# Patient Record
Sex: FEMALE | Race: WHITE | Employment: OTHER | URBAN - METROPOLITAN AREA
[De-identification: names, ages, dates, MRNs, and addresses within clinical notes are randomized per-mention and may not be internally consistent; named-entity substitution may affect disease eponyms.]

---

## 2021-07-25 ENCOUNTER — APPOINTMENT (OUTPATIENT)
Dept: GENERAL RADIOLOGY | Age: 86
DRG: 871 | End: 2021-07-25
Attending: EMERGENCY MEDICINE
Payer: MEDICARE

## 2021-07-25 ENCOUNTER — HOSPITAL ENCOUNTER (INPATIENT)
Age: 86
LOS: 8 days | Discharge: NURSING, REHAB OR LONG-TERM CARE | DRG: 871 | End: 2021-08-02
Attending: EMERGENCY MEDICINE | Admitting: INTERNAL MEDICINE
Payer: MEDICARE

## 2021-07-25 DIAGNOSIS — A41.9 SEPSIS WITHOUT ACUTE ORGAN DYSFUNCTION, DUE TO UNSPECIFIED ORGANISM (HCC): ICD-10-CM

## 2021-07-25 DIAGNOSIS — R07.9 ACUTE CHEST PAIN: Primary | ICD-10-CM

## 2021-07-25 DIAGNOSIS — K81.0 CHOLECYSTITIS, ACUTE: ICD-10-CM

## 2021-07-25 DIAGNOSIS — N30.00 ACUTE CYSTITIS WITHOUT HEMATURIA: ICD-10-CM

## 2021-07-25 PROBLEM — I20.0 UNSTABLE ANGINA (HCC): Status: ACTIVE | Noted: 2021-07-25

## 2021-07-25 LAB
ALBUMIN SERPL-MCNC: 3.3 G/DL (ref 3.5–5)
ALBUMIN/GLOB SERPL: 1 {RATIO} (ref 1.1–2.2)
ALP SERPL-CCNC: 52 U/L (ref 45–117)
ALT SERPL-CCNC: 31 U/L (ref 12–78)
ANION GAP SERPL CALC-SCNC: 6 MMOL/L (ref 5–15)
APTT PPP: 31.4 SEC (ref 21.2–34.1)
AST SERPL W P-5'-P-CCNC: 33 U/L (ref 15–37)
ATRIAL RATE: 92 BPM
BASOPHILS # BLD: 0 K/UL (ref 0–0.1)
BASOPHILS NFR BLD: 0 % (ref 0–1)
BILIRUB SERPL-MCNC: 1.2 MG/DL (ref 0.2–1)
BNP SERPL-MCNC: 4475 PG/ML
BUN SERPL-MCNC: 27 MG/DL (ref 6–20)
BUN/CREAT SERPL: 29 (ref 12–20)
CA-I BLD-MCNC: 9.4 MG/DL (ref 8.5–10.1)
CALCULATED R AXIS, ECG10: 15 DEGREES
CALCULATED T AXIS, ECG11: -43 DEGREES
CHLORIDE SERPL-SCNC: 105 MMOL/L (ref 97–108)
CO2 SERPL-SCNC: 28 MMOL/L (ref 21–32)
COVID-19 RAPID TEST, COVR: NOT DETECTED
CREAT SERPL-MCNC: 0.92 MG/DL (ref 0.55–1.02)
DIAGNOSIS, 93000: NORMAL
DIFFERENTIAL METHOD BLD: ABNORMAL
EOSINOPHIL # BLD: 0 K/UL (ref 0–0.4)
EOSINOPHIL NFR BLD: 0 % (ref 0–7)
ERYTHROCYTE [DISTWIDTH] IN BLOOD BY AUTOMATED COUNT: 15.7 % (ref 11.5–14.5)
GLOBULIN SER CALC-MCNC: 3.2 G/DL (ref 2–4)
GLUCOSE SERPL-MCNC: 171 MG/DL (ref 65–100)
HCT VFR BLD AUTO: 34 % (ref 35–47)
HGB BLD-MCNC: 11 G/DL (ref 11.5–16)
IMM GRANULOCYTES # BLD AUTO: 0.1 K/UL (ref 0–0.04)
IMM GRANULOCYTES NFR BLD AUTO: 1 % (ref 0–0.5)
INR PPP: 1.4 (ref 0.9–1.1)
LYMPHOCYTES # BLD: 0.9 K/UL (ref 0.8–3.5)
LYMPHOCYTES NFR BLD: 6 % (ref 12–49)
MCH RBC QN AUTO: 29 PG (ref 26–34)
MCHC RBC AUTO-ENTMCNC: 32.4 G/DL (ref 30–36.5)
MCV RBC AUTO: 89.7 FL (ref 80–99)
MONOCYTES # BLD: 0.7 K/UL (ref 0–1)
MONOCYTES NFR BLD: 4 % (ref 5–13)
NEUTS SEG # BLD: 13.4 K/UL (ref 1.8–8)
NEUTS SEG NFR BLD: 89 % (ref 32–75)
NRBC # BLD: 0 K/UL (ref 0–0.01)
NRBC BLD-RTO: 0 PER 100 WBC
PLATELET # BLD AUTO: 172 K/UL (ref 150–400)
PMV BLD AUTO: 11.5 FL (ref 8.9–12.9)
POTASSIUM SERPL-SCNC: 3.6 MMOL/L (ref 3.5–5.1)
PROT SERPL-MCNC: 6.5 G/DL (ref 6.4–8.2)
PROTHROMBIN TIME: 17 SEC (ref 11.9–14.7)
Q-T INTERVAL, ECG07: 352 MS
QRS DURATION, ECG06: 98 MS
QTC CALCULATION (BEZET), ECG08: 447 MS
RBC # BLD AUTO: 3.79 M/UL (ref 3.8–5.2)
SODIUM SERPL-SCNC: 139 MMOL/L (ref 136–145)
SPECIMEN SOURCE: NORMAL
THERAPEUTIC RANGE,PTTT: NORMAL SEC (ref 82–109)
TROPONIN I SERPL-MCNC: <0.05 NG/ML
TSH SERPL DL<=0.05 MIU/L-ACNC: 0.82 UIU/ML (ref 0.36–3.74)
VENTRICULAR RATE, ECG03: 97 BPM
WBC # BLD AUTO: 15 K/UL (ref 3.6–11)

## 2021-07-25 PROCEDURE — 84443 ASSAY THYROID STIM HORMONE: CPT

## 2021-07-25 PROCEDURE — 83880 ASSAY OF NATRIURETIC PEPTIDE: CPT

## 2021-07-25 PROCEDURE — 85610 PROTHROMBIN TIME: CPT

## 2021-07-25 PROCEDURE — 85730 THROMBOPLASTIN TIME PARTIAL: CPT

## 2021-07-25 PROCEDURE — 85025 COMPLETE CBC W/AUTO DIFF WBC: CPT

## 2021-07-25 PROCEDURE — 99218 HC RM OBSERVATION: CPT

## 2021-07-25 PROCEDURE — 36415 COLL VENOUS BLD VENIPUNCTURE: CPT

## 2021-07-25 PROCEDURE — 84484 ASSAY OF TROPONIN QUANT: CPT

## 2021-07-25 PROCEDURE — 74011250637 HC RX REV CODE- 250/637: Performed by: NURSE PRACTITIONER

## 2021-07-25 PROCEDURE — 99285 EMERGENCY DEPT VISIT HI MDM: CPT

## 2021-07-25 PROCEDURE — 80053 COMPREHEN METABOLIC PANEL: CPT

## 2021-07-25 PROCEDURE — 71045 X-RAY EXAM CHEST 1 VIEW: CPT

## 2021-07-25 PROCEDURE — 93005 ELECTROCARDIOGRAM TRACING: CPT

## 2021-07-25 PROCEDURE — 74011250636 HC RX REV CODE- 250/636: Performed by: EMERGENCY MEDICINE

## 2021-07-25 PROCEDURE — 87635 SARS-COV-2 COVID-19 AMP PRB: CPT

## 2021-07-25 PROCEDURE — 74011250636 HC RX REV CODE- 250/636: Performed by: NURSE PRACTITIONER

## 2021-07-25 PROCEDURE — 65270000029 HC RM PRIVATE

## 2021-07-25 RX ORDER — METOPROLOL SUCCINATE 50 MG/1
50 TABLET, EXTENDED RELEASE ORAL DAILY
COMMUNITY
End: 2021-08-02

## 2021-07-25 RX ORDER — LISINOPRIL 20 MG/1
20 TABLET ORAL DAILY
COMMUNITY
End: 2021-08-02

## 2021-07-25 RX ORDER — MINOXIDIL 2.5 MG/1
5 TABLET ORAL DAILY
Status: DISCONTINUED | OUTPATIENT
Start: 2021-07-26 | End: 2021-07-31

## 2021-07-25 RX ORDER — METOPROLOL SUCCINATE 50 MG/1
50 TABLET, EXTENDED RELEASE ORAL DAILY
Status: CANCELLED | OUTPATIENT
Start: 2021-07-26

## 2021-07-25 RX ORDER — ATORVASTATIN CALCIUM 20 MG/1
20 TABLET, FILM COATED ORAL DAILY
Status: DISCONTINUED | OUTPATIENT
Start: 2021-07-26 | End: 2021-08-02 | Stop reason: HOSPADM

## 2021-07-25 RX ORDER — MELATONIN
2000 DAILY
Status: DISCONTINUED | OUTPATIENT
Start: 2021-07-26 | End: 2021-08-02 | Stop reason: HOSPADM

## 2021-07-25 RX ORDER — ATORVASTATIN CALCIUM 20 MG/1
20 TABLET, FILM COATED ORAL DAILY
Status: CANCELLED | OUTPATIENT
Start: 2021-07-26

## 2021-07-25 RX ORDER — ENOXAPARIN SODIUM 100 MG/ML
40 INJECTION SUBCUTANEOUS DAILY
Status: DISCONTINUED | OUTPATIENT
Start: 2021-07-26 | End: 2021-07-27

## 2021-07-25 RX ORDER — LEVOTHYROXINE SODIUM 75 UG/1
75 TABLET ORAL
Status: CANCELLED | OUTPATIENT
Start: 2021-07-26

## 2021-07-25 RX ORDER — ONDANSETRON 4 MG/1
4 TABLET, ORALLY DISINTEGRATING ORAL
Status: DISCONTINUED | OUTPATIENT
Start: 2021-07-25 | End: 2021-07-28

## 2021-07-25 RX ORDER — FUROSEMIDE 10 MG/ML
20 INJECTION INTRAMUSCULAR; INTRAVENOUS
Status: COMPLETED | OUTPATIENT
Start: 2021-07-25 | End: 2021-07-25

## 2021-07-25 RX ORDER — ATORVASTATIN CALCIUM 20 MG/1
20 TABLET, FILM COATED ORAL DAILY
COMMUNITY

## 2021-07-25 RX ORDER — HEPARIN SODIUM 1000 [USP'U]/ML
4000 INJECTION, SOLUTION INTRAVENOUS; SUBCUTANEOUS AS NEEDED
Status: DISCONTINUED | OUTPATIENT
Start: 2021-07-25 | End: 2021-07-25

## 2021-07-25 RX ORDER — ACETAMINOPHEN 650 MG/1
650 SUPPOSITORY RECTAL
Status: DISCONTINUED | OUTPATIENT
Start: 2021-07-25 | End: 2021-08-02 | Stop reason: HOSPADM

## 2021-07-25 RX ORDER — ACETAMINOPHEN 325 MG/1
650 TABLET ORAL
Status: DISCONTINUED | OUTPATIENT
Start: 2021-07-25 | End: 2021-08-02 | Stop reason: HOSPADM

## 2021-07-25 RX ORDER — FUROSEMIDE 10 MG/ML
40 INJECTION INTRAMUSCULAR; INTRAVENOUS 2 TIMES DAILY
Status: DISCONTINUED | OUTPATIENT
Start: 2021-07-25 | End: 2021-07-26

## 2021-07-25 RX ORDER — ONDANSETRON 2 MG/ML
4 INJECTION INTRAMUSCULAR; INTRAVENOUS
Status: DISCONTINUED | OUTPATIENT
Start: 2021-07-25 | End: 2021-07-28

## 2021-07-25 RX ORDER — SODIUM CHLORIDE 0.9 % (FLUSH) 0.9 %
5-40 SYRINGE (ML) INJECTION AS NEEDED
Status: DISCONTINUED | OUTPATIENT
Start: 2021-07-25 | End: 2021-08-02 | Stop reason: HOSPADM

## 2021-07-25 RX ORDER — LISINOPRIL 20 MG/1
20 TABLET ORAL DAILY
Status: DISCONTINUED | OUTPATIENT
Start: 2021-07-26 | End: 2021-07-27

## 2021-07-25 RX ORDER — HEPARIN SODIUM 10000 [USP'U]/100ML
12-25 INJECTION, SOLUTION INTRAVENOUS
Status: DISCONTINUED | OUTPATIENT
Start: 2021-07-25 | End: 2021-07-25

## 2021-07-25 RX ORDER — HYDROCHLOROTHIAZIDE 25 MG/1
25 TABLET ORAL DAILY
COMMUNITY
End: 2021-08-02

## 2021-07-25 RX ORDER — MINOXIDIL 10 MG/1
5 TABLET ORAL DAILY
COMMUNITY

## 2021-07-25 RX ORDER — BISACODYL 5 MG
5 TABLET, DELAYED RELEASE (ENTERIC COATED) ORAL DAILY PRN
Status: DISCONTINUED | OUTPATIENT
Start: 2021-07-25 | End: 2021-08-02 | Stop reason: HOSPADM

## 2021-07-25 RX ORDER — CALCIUM CARBONATE/VITAMIN D3 600MG-5MCG
1 TABLET ORAL 2 TIMES DAILY
Status: DISCONTINUED | OUTPATIENT
Start: 2021-07-25 | End: 2021-08-02 | Stop reason: HOSPADM

## 2021-07-25 RX ORDER — LISINOPRIL 10 MG/1
20 TABLET ORAL DAILY
Status: CANCELLED | OUTPATIENT
Start: 2021-07-26

## 2021-07-25 RX ORDER — FAMOTIDINE 20 MG/1
20 TABLET, FILM COATED ORAL 2 TIMES DAILY
Status: DISCONTINUED | OUTPATIENT
Start: 2021-07-25 | End: 2021-08-02 | Stop reason: HOSPADM

## 2021-07-25 RX ORDER — CHOLECALCIFEROL (VITAMIN D3) 125 MCG
2000 CAPSULE ORAL DAILY
Status: CANCELLED | OUTPATIENT
Start: 2021-07-26

## 2021-07-25 RX ORDER — ISOSORBIDE MONONITRATE 30 MG/1
30 TABLET, EXTENDED RELEASE ORAL DAILY
Status: CANCELLED | OUTPATIENT
Start: 2021-07-26

## 2021-07-25 RX ORDER — METOPROLOL SUCCINATE 25 MG/1
50 TABLET, EXTENDED RELEASE ORAL DAILY
Status: DISCONTINUED | OUTPATIENT
Start: 2021-07-26 | End: 2021-08-02 | Stop reason: HOSPADM

## 2021-07-25 RX ORDER — NIACIN 1000 MG/1
1000 TABLET, EXTENDED RELEASE ORAL
COMMUNITY

## 2021-07-25 RX ORDER — SODIUM CHLORIDE 0.9 % (FLUSH) 0.9 %
5-40 SYRINGE (ML) INJECTION EVERY 8 HOURS
Status: DISCONTINUED | OUTPATIENT
Start: 2021-07-25 | End: 2021-08-02 | Stop reason: HOSPADM

## 2021-07-25 RX ORDER — CHOLECALCIFEROL (VITAMIN D3) 125 MCG
2000 CAPSULE ORAL
COMMUNITY

## 2021-07-25 RX ORDER — LEVOTHYROXINE SODIUM 75 UG/1
75 TABLET ORAL
COMMUNITY

## 2021-07-25 RX ORDER — WARFARIN SODIUM 5 MG/1
2.5 TABLET ORAL DAILY
COMMUNITY

## 2021-07-25 RX ORDER — MINOXIDIL 2.5 MG/1
5 TABLET ORAL DAILY
Status: CANCELLED | OUTPATIENT
Start: 2021-07-26

## 2021-07-25 RX ORDER — ISOSORBIDE MONONITRATE 30 MG/1
30 TABLET, EXTENDED RELEASE ORAL DAILY
Status: DISCONTINUED | OUTPATIENT
Start: 2021-07-26 | End: 2021-08-02 | Stop reason: HOSPADM

## 2021-07-25 RX ORDER — HEPARIN SODIUM 1000 [USP'U]/ML
2000 INJECTION, SOLUTION INTRAVENOUS; SUBCUTANEOUS AS NEEDED
Status: DISCONTINUED | OUTPATIENT
Start: 2021-07-25 | End: 2021-07-25

## 2021-07-25 RX ORDER — LEVOTHYROXINE SODIUM 75 UG/1
75 TABLET ORAL
Status: DISCONTINUED | OUTPATIENT
Start: 2021-07-26 | End: 2021-08-02 | Stop reason: HOSPADM

## 2021-07-25 RX ORDER — ISOSORBIDE MONONITRATE 30 MG/1
30 TABLET, EXTENDED RELEASE ORAL DAILY
COMMUNITY

## 2021-07-25 RX ORDER — POLYETHYLENE GLYCOL 3350 17 G/17G
17 POWDER, FOR SOLUTION ORAL DAILY PRN
Status: DISCONTINUED | OUTPATIENT
Start: 2021-07-25 | End: 2021-08-02 | Stop reason: HOSPADM

## 2021-07-25 RX ADMIN — FAMOTIDINE 20 MG: 20 TABLET ORAL at 21:02

## 2021-07-25 RX ADMIN — Medication 1 TABLET: at 21:02

## 2021-07-25 RX ADMIN — Medication 10 ML: at 21:02

## 2021-07-25 RX ADMIN — FUROSEMIDE 40 MG: 10 INJECTION, SOLUTION INTRAMUSCULAR; INTRAVENOUS at 21:02

## 2021-07-25 RX ADMIN — FUROSEMIDE 20 MG: 10 INJECTION INTRAMUSCULAR; INTRAVENOUS at 14:39

## 2021-07-25 NOTE — H&P
History and Physical    Patient: Gibson Huitron MRN: [id number]  SSN: [id number]    YOB: 1933  Age: 80 y.o. Sex: female      Subjective:      Gibson Huitron is a 80 y.o. female with past medical history of HTN, CAD, afib presenting to the ED with chest pain. Chest pain started last night around 2100 located substernally and in her RUQ and LUQ. She reports it was a sharp pain. She was still having chest pain this morning when she woke up and was given ASA 325mg, 1 tab of Nitroglycerin, and 50mcg of Fentanyl and received relief. Daughter at the bedside reports that 11 year ago patient had a cardiac cath and found to have a lesion in an arch and instead of stenting, opted to start on anticoagulation. Patient reports that she has had vaginal bleeding since starting Plavix, but has had a complete hysterectomy. Patient reports she has trouble ambulating due to the shortness of breath, for example getting her mail. Significant findings include elevated WBC, subtherapeutic INR, elevated BNP, and CXR with central pulmonary vascular prominence likely accounting for the fullness in the  beatrice. There are also likely coarsened interstitial opacities that could  represent early interstitial edema or other interstitial process. Patient admitted for evaluation and management. Past Medical History:   Diagnosis Date    Atrial fibrillation ([address]     CAD (coronary artery disease)     Endometrial cancer (HCC)     Hypertension      Past Surgical History:   Procedure Laterality Date    HX HYSTERECTOMY        Family History   Problem Relation Age of Onset    Hypertension Father     Cancer Father     Hypertension Brother      Social History     Tobacco Use    Smoking status: Never Smoker   Substance Use Topics    Alcohol use: Not on file      Prior to Admission medications    Medication Sig Start Date End Date Taking?  Authorizing Provider   metoprolol succinate (TOPROL-XL) 50 mg XL tablet Take 50 mg by mouth daily. Provider, Historical   hydroCHLOROthiazide (HYDRODIURIL) 25 mg tablet Take 25 mg by mouth daily. Provider, Historical   niacin (NIASPAN) 1,000 mg Tb24 tab Take 1,000 mg by mouth nightly. Provider, Historical   warfarin (Coumadin) 5 mg tablet Take 5 mg by mouth daily. Provider, Historical   atorvastatin (LIPITOR) 20 mg tablet Take 20 mg by mouth daily. Provider, Historical   lisinopriL (PRINIVIL, ZESTRIL) 20 mg tablet Take 20 mg by mouth daily. Provider, Historical   minoxidiL (LONITEN) 10 mg tablet Take 5 mg by mouth daily. Provider, Historical   isosorbide mononitrate ER (IMDUR) 30 mg tablet Take 30 mg by mouth daily. Provider, Historical   cholecalciferol, vitamin D3, (Vitamin D3) 50 mcg (2,000 unit) tab Take 2,000 Units by mouth. Provider, Historical   Calcium-Cholecalciferol, D3, 600 mg(1,500mg) -400 unit chew Take 1,500 mg by mouth. Provider, Historical   levothyroxine (SYNTHROID) 75 mcg tablet Take 75 mcg by mouth Daily (before breakfast). Provider, Historical        Allergies   Allergen Reactions    Penicillins Unknown (comments)       Review of Systems   Constitutional: Positive for malaise/fatigue. Negative for chills and fever. HENT: Positive for congestion and hearing loss. Eyes: Negative. Respiratory: Positive for cough and shortness of breath. Negative for sputum production and wheezing. Cardiovascular: Positive for chest pain and leg swelling. Gastrointestinal: Positive for abdominal pain. Negative for nausea and vomiting. Genitourinary: Negative. Musculoskeletal: Positive for joint pain. Skin:        Bruising, due to anticoagulation   Neurological: Positive for dizziness. Endo/Heme/Allergies: Bruises/bleeds easily. Psychiatric/Behavioral: Negative.            Objective:     Vitals:    07/25/21 0959 07/25/21 1021   BP: (!) 153/68    Pulse: 98    Resp: 15    Temp: 100.3 °F (37.9 °C)    SpO2: 98%    Weight:  85.3 kg (188 lb)   Height: 5' 4\" (1.626 m)        Physical Exam  Constitutional:       General: She is awake. Appearance: Normal appearance. She is well-groomed and overweight. She is not ill-appearing or toxic-appearing. HENT:      Head: Normocephalic and atraumatic. Right Ear: External ear normal. Decreased hearing noted. Left Ear: Hearing and external ear normal.      Nose: Nose normal.      Mouth/Throat:      Lips: Pink. No lesions. Mouth: Mucous membranes are moist.      Tongue: No lesions. Pharynx: No pharyngeal swelling or posterior oropharyngeal erythema. Eyes:      General: No scleral icterus. Pupils: Pupils are equal, round, and reactive to light. Cardiovascular:      Rate and Rhythm: Normal rate and regular rhythm. Pulses:           Radial pulses are 2+ on the right side and 2+ on the left side. Heart sounds: S1 normal and S2 normal. No murmur heard. No friction rub. No gallop. Pulmonary:      Effort: Pulmonary effort is normal.      Breath sounds: No wheezing, rhonchi or rales. Abdominal:      General: Abdomen is protuberant. Bowel sounds are normal. There is no distension. Palpations: Abdomen is soft. Tenderness: There is no abdominal tenderness. Musculoskeletal:      Cervical back: Full passive range of motion without pain and neck supple. Right lower leg: No edema. Left lower leg: No edema. Lymphadenopathy:      Cervical: No cervical adenopathy. Skin:     General: Skin is warm and dry. Findings: Ecchymosis present. Neurological:      Mental Status: She is alert and oriented to person, place, and time. GCS: GCS eye subscore is 4. GCS verbal subscore is 5. GCS motor subscore is 6. Psychiatric:         Attention and Perception: Attention normal.         Mood and Affect: Mood normal.         Speech: Speech normal.         Behavior: Behavior is cooperative.           Recent Results (from the past 24 hour(s))   EKG, 12 LEAD, INITIAL Collection Time: 07/25/21  9:36 AM   Result Value Ref Range    Ventricular Rate 97 BPM    Atrial Rate 92 BPM    QRS Duration 98 ms    Q-T Interval 352 ms    QTC Calculation (Bezet) 447 ms    Calculated R Axis 15 degrees    Calculated T Axis -43 degrees    Diagnosis       Atrial fibrillation  Nonspecific T wave abnormality  Abnormal ECG  No previous ECGs available  Confirmed by Juancarlos Sandra ([id number]) on 7/25/2021 10:51:15 AM     CBC WITH AUTOMATED DIFF    Collection Time: 07/25/21  9:50 AM   Result Value Ref Range    WBC 15.0 (H) 3.6 - 11.0 K/uL    RBC 3.79 (L) 3.80 - 5.20 M/uL    HGB 11.0 (L) 11.5 - 16.0 g/dL    HCT 34.0 (L) 35.0 - 47.0 %    MCV 89.7 80.0 - 99.0 FL    MCH 29.0 26.0 - 34.0 PG    MCHC 32.4 30.0 - 36.5 g/dL    RDW 15.7 (H) 11.5 - 14.5 %    PLATELET 654 837 - 044 K/uL    MPV 11.5 8.9 - 12.9 FL    NRBC 0.0 0.0  WBC    ABSOLUTE NRBC 0.00 0.00 - 0.01 K/uL    NEUTROPHILS 89 (H) 32 - 75 %    LYMPHOCYTES 6 (L) 12 - 49 %    MONOCYTES 4 (L) 5 - 13 %    EOSINOPHILS 0 0 - 7 %    BASOPHILS 0 0 - 1 %    IMMATURE GRANULOCYTES 1 (H) 0 - 0.5 %    ABS. NEUTROPHILS 13.4 (H) 1.8 - 8.0 K/UL    ABS. LYMPHOCYTES 0.9 0.8 - 3.5 K/UL    ABS. MONOCYTES 0.7 0.0 - 1.0 K/UL    ABS. EOSINOPHILS 0.0 0.0 - 0.4 K/UL    ABS. BASOPHILS 0.0 0.0 - 0.1 K/UL    ABS. IMM.  GRANS. 0.1 (H) 0.00 - 0.04 K/UL    DF AUTOMATED     PROTHROMBIN TIME + INR    Collection Time: 07/25/21  9:50 AM   Result Value Ref Range    Prothrombin time 17.0 (H) 11.9 - 14.7 sec    INR 1.4 (H) 0.9 - 1.1     PTT    Collection Time: 07/25/21  9:50 AM   Result Value Ref Range    aPTT 31.4 21.2 - 34.1 sec    aPTT, therapeutic range   82 - 790 sec   METABOLIC PANEL, COMPREHENSIVE    Collection Time: 07/25/21 10:50 AM   Result Value Ref Range    Sodium 139 136 - 145 mmol/L    Potassium 3.6 3.5 - 5.1 mmol/L    Chloride 105 97 - 108 mmol/L    CO2 28 21 - 32 mmol/L    Anion gap 6 5 - 15 mmol/L    Glucose 171 (H) 65 - 100 mg/dL    BUN 27 (H) 6 - 20 mg/dL    Creatinine 0.92 0.55 - 1.02 mg/dL    BUN/Creatinine ratio 29 (H) 12 - 20      GFR est AA >60 >60 ml/min/1.73m2    GFR est non-AA 58 (L) >60 ml/min/1.73m2    Calcium 9.4 8.5 - 10.1 mg/dL    Bilirubin, total 1.2 (H) 0.2 - 1.0 mg/dL    AST (SGOT) 33 15 - 37 U/L    ALT (SGPT) 31 12 - 78 U/L    Alk. phosphatase 52 45 - 117 U/L    Protein, total 6.5 6.4 - 8.2 g/dL    Albumin 3.3 (L) 3.5 - 5.0 g/dL    Globulin 3.2 2.0 - 4.0 g/dL    A-G Ratio 1.0 (L) 1.1 - 2.2     BNP    Collection Time: 07/25/21 10:50 AM   Result Value Ref Range    NT pro-BNP 4,475 (H) <450 pg/mL   TROPONIN I    Collection Time: 07/25/21 10:50 AM   Result Value Ref Range    Troponin-I, Qt. <0.05 <0.05 ng/mL       XR Results (maximum last 3): Results from Hospital Encounter encounter on 07/25/21    XR CHEST PORT    Narrative  Examination: XR CHEST PORT    History: CP    Comparison: None available. FINDINGS:    Single frontal portable view of the chest. Symmetric lung volumes. Central  pulmonary vascular prominence likely accounting for the fullness in the beatrice. Slightly coarsened interstitial opacities most pronounced in the lung bases. No  definite focal airspace consolidation, pneumothorax, or significant pleural  effusion. Cardiac silhouette is normal in size. Atherosclerosis of the thoracic  aorta. No acute or aggressive osseous abnormalities are evident. Impression  Central pulmonary vascular prominence likely accounting for the fullness in the  beatrice. There are also likely coarsened interstitial opacities that could  represent early interstitial edema or other interstitial process. No focal  airspace consolidation is evident. Patient Active Problem List   Diagnosis Code    Unstable angina (HCC) I20.0    Chest pain with moderate risk for cardiac etiology R07.9        Assessment/Plan:   1. Chest pain r/o ACS  2. Fluid overload  3 CHF  4. A-fib  5.  CAD  - Admit inpatient with tele  - Cardiology consult  - Serial troponin Q6H x 3  - ECHO  - Continue Imdur 30mg PO every day  - Continue metoprolol 50mg PO  - Hold home coumadin  - Lasix 40mg IV BID    6. Hypertension  - Home Lisinopril 20mg PO every day  - Home Loniten 5mg PO every day    7. Hyperlipidemia  - Home atorvastatin 20mg PO every day    8. Hypothyroidism  - Home Synthroid 75mcg PO every day  - TSH level    9. Leukocytosis  - UA  - Urine Culture    DVT Prophylaxis: Lovenox 40mg SQ QD  GI Prophylaxis:Pepcid 20mg PO  Disposition:cardiology consult, 48 hours<  Code Status:Full Code  POA/NOK:daughter,   Total Time spent in direct and indirect care including assessment review of labs and coordination of services and consultations: Greater than 75 minutes    Beto Love, ACNP- Student    Patient was evaluated and examined by me independently.   Supervised NP student    Signed By: Beto Love    July 25, 2021

## 2021-07-25 NOTE — ED PROVIDER NOTES
HPI   Chief Complaint   Patient presents with    Chest Pain     88yoF hx afib, CAD, DM and HTN presents with chest pain. Pt reports 8:45am this morning while at rest starting to have severe constant pressure like chest pain, localized under left breast and radiating up sternum, associated with mild SOB. No cough, no fever. Per family pt had last cath 11 years ago, had lesions that were not stented. EMS gave 324 asa, 1 nitroglycerin sublingual, fentanyl 50mcg. Pt reports immediate pain relief from these. Pt is on coumadin for afib. Past Medical History:   Diagnosis Date    Atrial [address] ([facility] [address]     CAD (coronary artery disease)     Hypertension        History reviewed. No pertinent surgical history. History reviewed. No pertinent family history. Social History     Socioeconomic History    Marital status:      Spouse name: Not on file    Number of children: Not on file    Years of education: Not on file    Highest education level: Not on file   Occupational History    Not on file   Tobacco Use    Smoking status: Never Smoker   Substance and Sexual Activity    Alcohol use: Not on file    Drug use: Not on file    Sexual activity: Not on file   Other Topics Concern    Not on file   Social History Narrative    Not on file     Social Determinants of Health     Financial Resource Strain:     Difficulty of Paying Living Expenses:    Food Insecurity:     Worried About Running Out of Food in the Last Year:     [address] in the Last Year:    Transportation Needs:     Lack of Transportation (Medical):      Lack of Transportation (Non-Medical):    Physical Activity:     Days of Exercise per Week:     Minutes of Exercise per Session:    Stress:     Feeling of Stress :    Social Connections:     Frequency of Communication with Friends and Family:     Frequency of Social Gatherings with Friends and Family:     Attends [employer] Services:     Active Member of Clubs or Organizations:     Attends Club or Organization Meetings:     Marital Status:    Intimate Partner Violence:     Fear of Current or Ex-Partner:     Emotionally Abused:     Physically Abused:     Sexually Abused: ALLERGIES: Penicillins    Review of Systems   Respiratory: Positive for shortness of breath. Cardiovascular: Positive for chest pain. All other systems reviewed and are negative. Vitals:    07/25/21 0959 07/25/21 1021   BP: (!) 153/68    Pulse: 98    Resp: 15    Temp: 100.3 °F (37.9 °C)    SpO2: 98%    Weight:  85.3 kg (188 lb)   Height:  5' 4\" (1.626 m)            Physical Exam   Patient Vitals for the past 8 hrs:   Temp Pulse Resp BP SpO2   07/25/21 0959 100.3 °F (37.9 °C) 98 15 (!) 153/68 98 %        Nursing note and vitals reviewed. Constitutional: NAD. Head: Normocephalic and atraumatic. Mouth/Throat: Airway patent. Moist mucous membranes. Eyes: EOMI. No scleral icterus. Neck: Neck supple. Cardiovascular: Normal rate, regular rhythm. Normal heart sounds. No murmur, rub, or gallop. Good pulses throughout. Pulmonary/Chest: No respiratory distress. Bibasilar crackles. Abdominal/GI: BS normal, Soft, non-tender, non-distended. No rebound or guarding. Musculoskeletal: No gross injuries or deformities. No leg swelling. Neurological: Alert and oriented to person, place, and time. Cranial Nerves 2-12 intact. Moving all extremities. No gross deficits. Psych: Pleasant, cooperative. Skin: Skin is warm and dry. No rash noted. MDM  Ddx = ACS, stable angina, CHF, GERD, esophageal spasm, pneumonia, pneumothorax. Workup showing negative trop (initial), pro-BNP is high. CXR showing central pulmonary edema. Given lasix 20mg. Started heparin infusion for possible unstable angina vs ACS. EKG was obtained for chest pain. My preliminary interpretation at 80 showing atrial fibrillation with rate of 97, no STEMI. I consulted Dr. Arleen Adrian who is admitting to hospitalist service. Labs Reviewed   CBC WITH AUTOMATED DIFF - Abnormal; Notable for the following components:       Result Value    WBC 15.0 (*)     RBC 3.79 (*)     HGB 11.0 (*)     HCT 34.0 (*)     RDW 15.7 (*)     NEUTROPHILS 89 (*)     LYMPHOCYTES 6 (*)     MONOCYTES 4 (*)     IMMATURE GRANULOCYTES 1 (*)     ABS. NEUTROPHILS 13.4 (*)     ABS. IMM. GRANS. 0.1 (*)     All other components within normal limits   PROTHROMBIN TIME + INR - Abnormal; Notable for the following components:    Prothrombin time 17.0 (*)     INR 1.4 (*)     All other components within normal limits   METABOLIC PANEL, COMPREHENSIVE - Abnormal; Notable for the following components:    Glucose 171 (*)     BUN 27 (*)     BUN/Creatinine ratio 29 (*)     GFR est non-AA 58 (*)     Bilirubin, total 1.2 (*)     Albumin 3.3 (*)     A-G Ratio 1.0 (*)     All other components within normal limits   BNP - Abnormal; Notable for the following components:    NT pro-BNP 4,475 (*)     All other components within normal limits   COVID-19 RAPID TEST   PTT   TROPONIN I   PTT   CBC WITH AUTOMATED DIFF     XR CHEST PORT   Final Result   Central pulmonary vascular prominence likely accounting for the fullness in the   beatrice. There are also likely coarsened interstitial opacities that could   represent early interstitial edema or other interstitial process. No focal   airspace consolidation is evident. Medications   heparin 25,000 units in D5W 250 ml infusion (has no administration in time range)   furosemide (LASIX) injection 20 mg (has no administration in time range)       ICD-10-CM ICD-9-CM    1. Acute chest pain  R07.9 786.50        IValentina MD, am  the first and primary ED provider for this patient.           Procedures

## 2021-07-25 NOTE — PROGRESS NOTES
Skin Assessment: Pt.'s skin is warm to the touch with good skin tugor. Pt. Has discoloration to her arms bilateral due to blood thinner medication. Pt. has no pressure ulcers to note upon admission.

## 2021-07-25 NOTE — ED NOTES
Pt presented to the ER with complaint of Chest pain. Pt stated she was paying bills at 2100 last night when she started having sharp chest pain to RUQ, LUQ and substernal. Pt stated she went to bed and woke up still with the chest pain. oriented to room and call bell,, cardiac monitoring initiated , spO2 Monitoring initiated , IV  initiated , call bell within reach, side rails up x2, resting comfortable but easily arousable, no signs of acute distress. , bed in lowest position, will continue to monitor      Airway: Patent, Managing oral secretions and No obstruction    Respiratory: Normal Rate , Normal Depth, No acute Distress and Speaking in full sentences    Cardiac: Chest pain, Non Radiating, Pain Scale 1/10, ECG Rhythm is Afib and Quality: dull    Neuro: AVPU ALert and A&O4/4    GI: Soft and Non-tender    : WNL    Muscle/Skeletal/Skin: WNL

## 2021-07-25 NOTE — Clinical Note
Patient Class[de-identified] OBSERVATION [104]   Type of Bed: Telemetry [19]   Cardiac Monitoring Required?: Yes   Reason for Observation: [place]   Admitting Diagnosis: Chest pain with moderate risk for cardiac etiology [[id number]]   Admitting Physician: Kristian Millan [id number] [facility]   Attending Physician: Kristian Millan [[id number]]

## 2021-07-26 ENCOUNTER — APPOINTMENT (OUTPATIENT)
Dept: ULTRASOUND IMAGING | Age: 86
DRG: 871 | End: 2021-07-26
Attending: PHYSICIAN ASSISTANT
Payer: MEDICARE

## 2021-07-26 ENCOUNTER — APPOINTMENT (OUTPATIENT)
Dept: CT IMAGING | Age: 86
DRG: 871 | End: 2021-07-26
Attending: PHYSICIAN ASSISTANT
Payer: MEDICARE

## 2021-07-26 ENCOUNTER — APPOINTMENT (OUTPATIENT)
Dept: GENERAL RADIOLOGY | Age: 86
DRG: 871 | End: 2021-07-26
Attending: PHYSICIAN ASSISTANT
Payer: MEDICARE

## 2021-07-26 ENCOUNTER — APPOINTMENT (OUTPATIENT)
Dept: NON INVASIVE DIAGNOSTICS | Age: 86
DRG: 871 | End: 2021-07-26
Attending: NURSE PRACTITIONER
Payer: MEDICARE

## 2021-07-26 LAB
ABO + RH BLD: NORMAL
ALBUMIN SERPL-MCNC: 2.6 G/DL (ref 3.5–5)
ALBUMIN/GLOB SERPL: 0.8 {RATIO} (ref 1.1–2.2)
ALP SERPL-CCNC: 50 U/L (ref 45–117)
ALT SERPL-CCNC: 35 U/L (ref 12–78)
ANION GAP SERPL CALC-SCNC: 6 MMOL/L (ref 5–15)
ANION GAP SERPL CALC-SCNC: 8 MMOL/L (ref 5–15)
APPEARANCE UR: ABNORMAL
AST SERPL W P-5'-P-CCNC: 38 U/L (ref 15–37)
BACTERIA URNS QL MICRO: ABNORMAL /HPF
BASOPHILS # BLD: 0 K/UL (ref 0–0.1)
BASOPHILS NFR BLD: 0 % (ref 0–1)
BILIRUB SERPL-MCNC: 2.6 MG/DL (ref 0.2–1)
BILIRUB UR QL: NEGATIVE
BLOOD GROUP ANTIBODIES SERPL: NEGATIVE
BNP SERPL-MCNC: ABNORMAL PG/ML
BUN SERPL-MCNC: 25 MG/DL (ref 6–20)
BUN SERPL-MCNC: 36 MG/DL (ref 6–20)
BUN/CREAT SERPL: 19 (ref 12–20)
BUN/CREAT SERPL: 27 (ref 12–20)
CA-I BLD-MCNC: 8 MG/DL (ref 8.5–10.1)
CA-I BLD-MCNC: 8.9 MG/DL (ref 8.5–10.1)
CHLORIDE SERPL-SCNC: 100 MMOL/L (ref 97–108)
CHLORIDE SERPL-SCNC: 99 MMOL/L (ref 97–108)
CHLORIDE UR-SCNC: 89 MMOL/L
CO2 SERPL-SCNC: 28 MMOL/L (ref 21–32)
CO2 SERPL-SCNC: 30 MMOL/L (ref 21–32)
COLOR UR: YELLOW
CREAT SERPL-MCNC: 0.93 MG/DL (ref 0.55–1.02)
CREAT SERPL-MCNC: 1.93 MG/DL (ref 0.55–1.02)
CREAT UR-MCNC: 80 MG/DL
DIFFERENTIAL METHOD BLD: ABNORMAL
ECHO AO ROOT DIAM: 3.3 CM
ECHO AV PEAK GRADIENT: 10 MMHG
ECHO AV PEAK VELOCITY: 156 CM/S
ECHO EST RA PRESSURE: 3 MMHG
ECHO LA AREA 4C: 20.92 CM2
ECHO LA MAJOR AXIS: 4.8 CM
ECHO LA MINOR AXIS: 2.51 CM
ECHO LV E' SEPTAL VELOCITY: 10.2 CM/S
ECHO LV EDV A2C: 88.1 CM3
ECHO LV EJECTION FRACTION BIPLANE: 65.1 % (ref 55–100)
ECHO LV ESV A2C: 23.6 CM3
ECHO LV INTERNAL DIMENSION DIASTOLIC: 4.45 CM (ref 3.9–5.3)
ECHO LV INTERNAL DIMENSION SYSTOLIC: 2.87 CM
ECHO LV IVSD: 1.48 CM (ref 0.6–0.9)
ECHO LV MASS 2D: 260.3 G (ref 67–162)
ECHO LV MASS INDEX 2D: 136.3 G/M2 (ref 43–95)
ECHO LV POSTERIOR WALL DIASTOLIC: 1.44 CM (ref 0.6–0.9)
ECHO LVOT PEAK GRADIENT: 5 MMHG
ECHO LVOT PEAK VELOCITY: 116 CM/S
ECHO MV AREA PHT: 2.62 CM2
ECHO MV MAX VELOCITY: 159 CM/S
ECHO MV MEAN GRADIENT: 2 MMHG
ECHO MV PEAK GRADIENT: 10 MMHG
ECHO MV PRESSURE HALF TIME (PHT): 84 MS
ECHO MV VTI: 39.3 CM
ECHO PV MAX VELOCITY: 122 CM/S
ECHO PV PEAK INSTANTANEOUS GRADIENT SYSTOLIC: 6 MMHG
ECHO RA AREA 4C: 21.31 CM2
ECHO RIGHT VENTRICULAR SYSTOLIC PRESSURE (RVSP): 39 MMHG
ECHO RV INTERNAL DIMENSION: 2.82 CM
ECHO TV MAX VELOCITY: 301 CM/S
ECHO TV REGURGITANT PEAK GRADIENT: 36 MMHG
EOSINOPHIL # BLD: 0 K/UL (ref 0–0.4)
EOSINOPHIL NFR BLD: 0 % (ref 0–7)
ERYTHROCYTE [DISTWIDTH] IN BLOOD BY AUTOMATED COUNT: 15.8 % (ref 11.5–14.5)
ERYTHROCYTE [DISTWIDTH] IN BLOOD BY AUTOMATED COUNT: 15.8 % (ref 11.5–14.5)
GLOBULIN SER CALC-MCNC: 3.4 G/DL (ref 2–4)
GLUCOSE SERPL-MCNC: 128 MG/DL (ref 65–100)
GLUCOSE SERPL-MCNC: 148 MG/DL (ref 65–100)
GLUCOSE UR STRIP.AUTO-MCNC: NEGATIVE MG/DL
HCT VFR BLD AUTO: 30.5 % (ref 35–47)
HCT VFR BLD AUTO: 33.6 % (ref 35–47)
HGB BLD-MCNC: 10.2 G/DL (ref 11.5–16)
HGB BLD-MCNC: 11.1 G/DL (ref 11.5–16)
HGB UR QL STRIP: ABNORMAL
IMM GRANULOCYTES # BLD AUTO: 0 K/UL
IMM GRANULOCYTES NFR BLD AUTO: 0 %
KETONES UR QL STRIP.AUTO: NEGATIVE MG/DL
LACTATE SERPL-SCNC: 1.2 MMOL/L (ref 0.4–2)
LEUKOCYTE ESTERASE UR QL STRIP.AUTO: ABNORMAL
LYMPHOCYTES # BLD: 1.9 K/UL (ref 0.8–3.5)
LYMPHOCYTES NFR BLD: 9 % (ref 12–49)
MCH RBC QN AUTO: 29.4 PG (ref 26–34)
MCH RBC QN AUTO: 29.7 PG (ref 26–34)
MCHC RBC AUTO-ENTMCNC: 33 G/DL (ref 30–36.5)
MCHC RBC AUTO-ENTMCNC: 33.4 G/DL (ref 30–36.5)
MCV RBC AUTO: 88.7 FL (ref 80–99)
MCV RBC AUTO: 88.9 FL (ref 80–99)
MONOCYTES # BLD: 1.3 K/UL (ref 0–1)
MONOCYTES NFR BLD: 6 % (ref 5–13)
MRSA DNA SPEC QL NAA+PROBE: NOT DETECTED
MUCOUS THREADS URNS QL MICRO: ABNORMAL /LPF
MV DEC SLOPE: 5320 MM/S2
MV DEC SLOPE: 5320 MM/S2
NEUTS SEG # BLD: 18 K/UL (ref 1.8–8)
NEUTS SEG NFR BLD: 85 % (ref 32–75)
NITRITE UR QL STRIP.AUTO: NEGATIVE
NRBC # BLD: 0 K/UL (ref 0–0.01)
NRBC # BLD: 0 K/UL (ref 0–0.01)
NRBC BLD-RTO: 0 PER 100 WBC
NRBC BLD-RTO: 0 PER 100 WBC
PH UR STRIP: 5 [PH] (ref 5–8)
PLATELET # BLD AUTO: 153 K/UL (ref 150–400)
PLATELET # BLD AUTO: 165 K/UL (ref 150–400)
PMV BLD AUTO: 10.7 FL (ref 8.9–12.9)
PMV BLD AUTO: 11 FL (ref 8.9–12.9)
POTASSIUM SERPL-SCNC: 3.1 MMOL/L (ref 3.5–5.1)
POTASSIUM SERPL-SCNC: 3.2 MMOL/L (ref 3.5–5.1)
POTASSIUM UR-SCNC: 41 MMOL/L
PROCALCITONIN SERPL-MCNC: 1.11 NG/ML
PROT SERPL-MCNC: 6 G/DL (ref 6.4–8.2)
PROT UR STRIP-MCNC: 30 MG/DL
PROT UR-MCNC: 52 MG/DL (ref 0–11.9)
RBC # BLD AUTO: 3.44 M/UL (ref 3.8–5.2)
RBC # BLD AUTO: 3.78 M/UL (ref 3.8–5.2)
RBC #/AREA URNS HPF: ABNORMAL /HPF (ref 0–5)
RBC MORPH BLD: ABNORMAL
SODIUM SERPL-SCNC: 135 MMOL/L (ref 136–145)
SODIUM SERPL-SCNC: 136 MMOL/L (ref 136–145)
SODIUM UR-SCNC: 68 MMOL/L
SP GR UR REFRACTOMETRY: 1.01 (ref 1–1.03)
SPECIMEN EXP DATE BLD: NORMAL
TROPONIN I SERPL-MCNC: <0.05 NG/ML
UA: UC IF INDICATED,UAUC: ABNORMAL
UROBILINOGEN UR QL STRIP.AUTO: 0.1 EU/DL (ref 0.1–1)
WBC # BLD AUTO: 16.6 K/UL (ref 3.6–11)
WBC # BLD AUTO: 21.2 K/UL (ref 3.6–11)
WBC URNS QL MICRO: >100 /HPF (ref 0–4)

## 2021-07-26 PROCEDURE — 87086 URINE CULTURE/COLONY COUNT: CPT

## 2021-07-26 PROCEDURE — 82436 ASSAY OF URINE CHLORIDE: CPT

## 2021-07-26 PROCEDURE — 87040 BLOOD CULTURE FOR BACTERIA: CPT

## 2021-07-26 PROCEDURE — 74011000636 HC RX REV CODE- 636: Performed by: INTERNAL MEDICINE

## 2021-07-26 PROCEDURE — 36415 COLL VENOUS BLD VENIPUNCTURE: CPT

## 2021-07-26 PROCEDURE — 84540 ASSAY OF URINE/UREA-N: CPT

## 2021-07-26 PROCEDURE — 65610000006 HC RM INTENSIVE CARE

## 2021-07-26 PROCEDURE — 94660 CPAP INITIATION&MGMT: CPT

## 2021-07-26 PROCEDURE — 74176 CT ABD & PELVIS W/O CONTRAST: CPT

## 2021-07-26 PROCEDURE — 74011250636 HC RX REV CODE- 250/636: Performed by: NURSE PRACTITIONER

## 2021-07-26 PROCEDURE — 84145 PROCALCITONIN (PCT): CPT

## 2021-07-26 PROCEDURE — 83605 ASSAY OF LACTIC ACID: CPT

## 2021-07-26 PROCEDURE — 02HV33Z INSERTION OF INFUSION DEVICE INTO SUPERIOR VENA CAVA, PERCUTANEOUS APPROACH: ICD-10-PCS | Performed by: PHYSICIAN ASSISTANT

## 2021-07-26 PROCEDURE — 76705 ECHO EXAM OF ABDOMEN: CPT

## 2021-07-26 PROCEDURE — 86901 BLOOD TYPING SEROLOGIC RH(D): CPT

## 2021-07-26 PROCEDURE — 74011250637 HC RX REV CODE- 250/637: Performed by: PHYSICIAN ASSISTANT

## 2021-07-26 PROCEDURE — 99232 SBSQ HOSP IP/OBS MODERATE 35: CPT | Performed by: OBSTETRICS & GYNECOLOGY

## 2021-07-26 PROCEDURE — 84484 ASSAY OF TROPONIN QUANT: CPT

## 2021-07-26 PROCEDURE — 87641 MR-STAPH DNA AMP PROBE: CPT

## 2021-07-26 PROCEDURE — 97161 PT EVAL LOW COMPLEX 20 MIN: CPT

## 2021-07-26 PROCEDURE — 84133 ASSAY OF URINE POTASSIUM: CPT

## 2021-07-26 PROCEDURE — 84156 ASSAY OF PROTEIN URINE: CPT

## 2021-07-26 PROCEDURE — 74011250636 HC RX REV CODE- 250/636: Performed by: PHYSICIAN ASSISTANT

## 2021-07-26 PROCEDURE — 74011000258 HC RX REV CODE- 258: Performed by: INTERNAL MEDICINE

## 2021-07-26 PROCEDURE — 71045 X-RAY EXAM CHEST 1 VIEW: CPT

## 2021-07-26 PROCEDURE — 80048 BASIC METABOLIC PNL TOTAL CA: CPT

## 2021-07-26 PROCEDURE — 82570 ASSAY OF URINE CREATININE: CPT

## 2021-07-26 PROCEDURE — 85027 COMPLETE CBC AUTOMATED: CPT

## 2021-07-26 PROCEDURE — 85025 COMPLETE CBC W/AUTO DIFF WBC: CPT

## 2021-07-26 PROCEDURE — 74011250636 HC RX REV CODE- 250/636: Performed by: INTERNAL MEDICINE

## 2021-07-26 PROCEDURE — 83880 ASSAY OF NATRIURETIC PEPTIDE: CPT

## 2021-07-26 PROCEDURE — 93306 TTE W/DOPPLER COMPLETE: CPT

## 2021-07-26 PROCEDURE — 87077 CULTURE AEROBIC IDENTIFY: CPT

## 2021-07-26 PROCEDURE — 83735 ASSAY OF MAGNESIUM: CPT

## 2021-07-26 PROCEDURE — 82310 ASSAY OF CALCIUM: CPT

## 2021-07-26 PROCEDURE — 84560 ASSAY OF URINE/URIC ACID: CPT

## 2021-07-26 PROCEDURE — 74011000250 HC RX REV CODE- 250: Performed by: PHYSICIAN ASSISTANT

## 2021-07-26 PROCEDURE — 81001 URINALYSIS AUTO W/SCOPE: CPT

## 2021-07-26 PROCEDURE — 97530 THERAPEUTIC ACTIVITIES: CPT

## 2021-07-26 PROCEDURE — 74011000250 HC RX REV CODE- 250

## 2021-07-26 PROCEDURE — 80053 COMPREHEN METABOLIC PANEL: CPT

## 2021-07-26 PROCEDURE — 74011250637 HC RX REV CODE- 250/637: Performed by: NURSE PRACTITIONER

## 2021-07-26 PROCEDURE — 84300 ASSAY OF URINE SODIUM: CPT

## 2021-07-26 PROCEDURE — 87186 SC STD MICRODIL/AGAR DIL: CPT

## 2021-07-26 RX ORDER — POTASSIUM CHLORIDE 20 MEQ/1
20 TABLET, EXTENDED RELEASE ORAL 2 TIMES DAILY
Status: DISCONTINUED | OUTPATIENT
Start: 2021-07-26 | End: 2021-07-26

## 2021-07-26 RX ORDER — MORPHINE SULFATE 2 MG/ML
2 INJECTION, SOLUTION INTRAMUSCULAR; INTRAVENOUS
Status: DISPENSED | OUTPATIENT
Start: 2021-07-26 | End: 2021-07-28

## 2021-07-26 RX ORDER — SODIUM CHLORIDE 9 MG/ML
100 INJECTION, SOLUTION INTRAVENOUS CONTINUOUS
Status: DISCONTINUED | OUTPATIENT
Start: 2021-07-26 | End: 2021-07-28

## 2021-07-26 RX ORDER — NOREPINEPHRINE BITARTRATE/D5W 8 MG/250ML
PLASTIC BAG, INJECTION (ML) INTRAVENOUS
Status: COMPLETED
Start: 2021-07-26 | End: 2021-07-26

## 2021-07-26 RX ORDER — POTASSIUM CHLORIDE 1.5 G/1.77G
40 POWDER, FOR SOLUTION ORAL DAILY
Status: DISCONTINUED | OUTPATIENT
Start: 2021-07-26 | End: 2021-07-26

## 2021-07-26 RX ORDER — POTASSIUM CHLORIDE 1.5 G/1.77G
40 POWDER, FOR SOLUTION ORAL ONCE
Status: DISCONTINUED | OUTPATIENT
Start: 2021-07-27 | End: 2021-07-26

## 2021-07-26 RX ORDER — POTASSIUM CHLORIDE 7.45 MG/ML
10 INJECTION INTRAVENOUS ONCE
Status: COMPLETED | OUTPATIENT
Start: 2021-07-27 | End: 2021-07-27

## 2021-07-26 RX ORDER — DOBUTAMINE HYDROCHLORIDE 200 MG/100ML
0-10 INJECTION INTRAVENOUS
Status: DISCONTINUED | OUTPATIENT
Start: 2021-07-26 | End: 2021-08-02 | Stop reason: HOSPADM

## 2021-07-26 RX ORDER — POTASSIUM CHLORIDE 7.45 MG/ML
10 INJECTION INTRAVENOUS
Status: DISPENSED | OUTPATIENT
Start: 2021-07-27 | End: 2021-07-27

## 2021-07-26 RX ORDER — PHENYLEPHRINE 10 MG/250 ML(40 MCG/ML)IN 0.9 % SOD.CHLORIDE INTRAVENOUS
10-100
Status: DISCONTINUED | OUTPATIENT
Start: 2021-07-26 | End: 2021-07-26

## 2021-07-26 RX ORDER — POTASSIUM CHLORIDE 20 MEQ/1
40 TABLET, EXTENDED RELEASE ORAL 2 TIMES DAILY
Status: DISCONTINUED | OUTPATIENT
Start: 2021-07-26 | End: 2021-07-26

## 2021-07-26 RX ORDER — FUROSEMIDE 10 MG/ML
40 INJECTION INTRAMUSCULAR; INTRAVENOUS DAILY
Status: DISCONTINUED | OUTPATIENT
Start: 2021-07-27 | End: 2021-07-27

## 2021-07-26 RX ORDER — MORPHINE SULFATE 2 MG/ML
2 INJECTION, SOLUTION INTRAMUSCULAR; INTRAVENOUS
Status: DISCONTINUED | OUTPATIENT
Start: 2021-07-26 | End: 2021-07-26 | Stop reason: DRUGHIGH

## 2021-07-26 RX ADMIN — METOPROLOL SUCCINATE 50 MG: 25 TABLET, EXTENDED RELEASE ORAL at 08:09

## 2021-07-26 RX ADMIN — Medication 1 TABLET: at 08:11

## 2021-07-26 RX ADMIN — ATORVASTATIN CALCIUM 20 MG: 20 TABLET, FILM COATED ORAL at 08:11

## 2021-07-26 RX ADMIN — LEVOTHYROXINE SODIUM 75 MCG: 75 TABLET ORAL at 08:11

## 2021-07-26 RX ADMIN — DOBUTAMINE IN DEXTROSE 10 MCG/KG/MIN: 200 INJECTION, SOLUTION INTRAVENOUS at 22:24

## 2021-07-26 RX ADMIN — Medication 10 ML: at 05:47

## 2021-07-26 RX ADMIN — PHENYLEPHRINE HYDROCHLORIDE 75 MCG/MIN: 10 INJECTION INTRAVENOUS at 19:45

## 2021-07-26 RX ADMIN — AMIODARONE HYDROCHLORIDE 1 MG/MIN: 50 INJECTION, SOLUTION INTRAVENOUS at 20:35

## 2021-07-26 RX ADMIN — CEFEPIME HYDROCHLORIDE 2 G: 2 INJECTION, POWDER, FOR SOLUTION INTRAVENOUS at 21:23

## 2021-07-26 RX ADMIN — Medication 10 MCG/MIN: at 22:40

## 2021-07-26 RX ADMIN — Medication 2000 UNITS: at 08:12

## 2021-07-26 RX ADMIN — FUROSEMIDE 40 MG: 10 INJECTION, SOLUTION INTRAMUSCULAR; INTRAVENOUS at 08:15

## 2021-07-26 RX ADMIN — SODIUM CHLORIDE 75 ML/HR: 9 INJECTION, SOLUTION INTRAVENOUS at 14:00

## 2021-07-26 RX ADMIN — ENOXAPARIN SODIUM 40 MG: 40 INJECTION SUBCUTANEOUS at 08:19

## 2021-07-26 RX ADMIN — VANCOMYCIN HYDROCHLORIDE 1000 MG: 1 INJECTION, POWDER, LYOPHILIZED, FOR SOLUTION INTRAVENOUS at 21:22

## 2021-07-26 RX ADMIN — LISINOPRIL 20 MG: 20 TABLET ORAL at 08:12

## 2021-07-26 RX ADMIN — ISOSORBIDE MONONITRATE 30 MG: 30 TABLET, EXTENDED RELEASE ORAL at 08:11

## 2021-07-26 RX ADMIN — SODIUM CHLORIDE 1000 ML: 9 INJECTION, SOLUTION INTRAVENOUS at 20:00

## 2021-07-26 RX ADMIN — DIATRIZOATE MEGLUMINE AND DIATRIZOATE SODIUM 30 ML: 660; 100 LIQUID ORAL; RECTAL at 16:43

## 2021-07-26 RX ADMIN — Medication 10 ML: at 21:24

## 2021-07-26 RX ADMIN — AZITHROMYCIN 500 MG: 500 INJECTION, POWDER, LYOPHILIZED, FOR SOLUTION INTRAVENOUS at 16:43

## 2021-07-26 RX ADMIN — Medication 10 ML: at 14:00

## 2021-07-26 RX ADMIN — FAMOTIDINE 20 MG: 20 TABLET ORAL at 08:12

## 2021-07-26 RX ADMIN — ONDANSETRON 4 MG: 2 INJECTION INTRAMUSCULAR; INTRAVENOUS at 12:35

## 2021-07-26 RX ADMIN — MINOXIDIL 5 MG: 2.5 TABLET ORAL at 08:12

## 2021-07-26 RX ADMIN — POTASSIUM CHLORIDE 20 MEQ: 1500 TABLET, EXTENDED RELEASE ORAL at 10:48

## 2021-07-26 NOTE — CONSULTS
[facility] CARDIOLOGY  CARDIOLOGY CONSULTATION    REASON FOR CONSULT: chest pain    REQUESTING PROVIDER: ISAI Condon    CHIEF COMPLAINT:  Chest pain    HISTORY OF PRESENT ILLNESS: Norma Grady is a 80year-old male with past medical history significant for hypertension, coronary artery disease, and atrial fibrillation who presents today for evaluation of chest pain. Patient developed chest pain approximately 12 hours prior to her presentation to the ED. There were no alleviating or aggravating factors. In route she received nitroglycerin, asa, and fentanyl with symptom relief. On examination, patient is lying in the bed. She does not appear to be in any acute distress. She is complaining of pain in her lower abdomen that crosses midline and into her back. Chest pain has improved. She is not short of breath at rest or with exertion. She offers no other complaints at this time. Patient was recently transitioned to Coumadin per primary cardiologist. Family at the bedside. Significant labs include: Troponin negative x 4, pro-BNP 4475, chest x-ray with coarsened interstitial opacities. EKG: atrial fibrillation; heart rate 97, non-specific T wave abnormality      Records from hospital admission course thus far reviewed. Since admission, patient has had routine testing and labs. PAST MEDICAL HISTORY:  Notes above. Relevant cardiac issues noted above. HOME MEDICATIONS:  Home medications reviewed, no side effects. ALLERGIES:  Allergies reviewed per patient chart and include: penicillin. SOCIAL HISTORY:  Notable for no tobacco use, no heavy alcohol or illicit drug use. REVIEW OF SYSTEMS:  Complete review of systems performed, pertinents noted above, all other systems are negative. PHYSICAL EXAMINATION:  Vital sign assessment reveal a blood pressure of 128/51 and pulse rate of 113. Body mass index is calculated at 32.27.   Cardiovascular exam has a heart with a normal rate and rhythm, normal S1 and S2. No murmurs present. There are no rubs or gallops. Good peripheral pulses. No jugular venous distension. No carotid bruits are present. Respiratory exam reveals clear lung fields, no rales or rhonchi. Gastrointestinal exam has soft, nontender abdomen with normal bowel sounds. No pedal edema. No notable skin changes. Neurologic exam is nonfocal.  Musculoskeletal exam is notable for a normal gait.     ..  Current Facility-Administered Medications:     potassium chloride (K-DUR, KLOR-CON) SR tablet 20 mEq, 20 mEq, Oral, BID, Jose Murillo PA-C, 20 mEq at 07/26/21 1048    0.9% sodium chloride infusion, 75 mL/hr, IntraVENous, CONTINUOUS, Uzma Murillo PA-C, Last Rate: 75 mL/hr at 07/26/21 1400, 75 mL/hr at 07/26/21 1400    furosemide (LASIX) injection 40 mg, 40 mg, IntraVENous, BID, Jennifer Cruz NP, 40 mg at 07/26/21 0815    sodium chloride (NS) flush 5-40 mL, 5-40 mL, IntraVENous, Q8H, Jennifer Cruz NP, 10 mL at 07/26/21 1400    sodium chloride (NS) flush 5-40 mL, 5-40 mL, IntraVENous, PRN, Jennifer Cruz NP  [facility]  acetaminophen (TYLENOL) tablet 650 mg, 650 mg, Oral, Q6H PRN **OR** acetaminophen (TYLENOL) suppository 650 mg, 650 mg, Rectal, Q6H PRN, Jennifer Cruz NP    polyethylene glycol (MIRALAX) packet 17 g, 17 g, Oral, DAILY PRN, Jennifer Cruz NP    bisacodyL (DULCOLAX) tablet 5 mg, 5 mg, Oral, DAILY PRN, Jennifer Cruz NP    ondansetron (ZOFRAN ODT) tablet 4 mg, 4 mg, Oral, Q6H PRN **OR** ondansetron (ZOFRAN) injection 4 mg, 4 mg, IntraVENous, Q6H PRN, Jennifer Cruz NP, 4 mg at 07/26/21 1235    famotidine (PEPCID) tablet 20 mg, 20 mg, Oral, BID, Jennifer Cruz NP, 20 mg at 07/26/21 6058    enoxaparin (LOVENOX) injection 40 mg, 40 mg, SubCUTAneous, DAILY, Jennifer Cruz NP, 40 mg at 07/26/21 0819    atorvastatin (LIPITOR) tablet 20 mg, 20 mg, Oral, DAILY, Jennifer Cruz NP, 20 mg at 07/26/21 0811    calcium-vitamin D 600 mg(1,500mg) -200 unit per tablet 1 Tablet, 1 Tablet, Oral, BID, Skedee Fine, NP, 1 Tablet at 07/26/21 4603    cholecalciferol (VITAMIN D3) (1000 Units /25 mcg) tablet 2,000 Units, 2,000 Units, Oral, DAILY, Jagruti Fine, NP, 2,000 Units at 07/26/21 3869    isosorbide mononitrate ER (IMDUR) tablet 30 mg, 30 mg, Oral, DAILY, Skedee Fine, NP, 30 mg at 07/26/21 8968    levothyroxine (SYNTHROID) tablet 75 mcg, 75 mcg, Oral, ACB, Jagruti Fine, NP, 75 mcg at 07/26/21 0811    lisinopriL (PRINIVIL, ZESTRIL) tablet 20 mg, 20 mg, Oral, DAILY, Skedee Fine, NP, 20 mg at 07/26/21 9384    metoprolol succinate (TOPROL-XL) XL tablet 50 mg, 50 mg, Oral, DAILY, Skedee Fine, NP, 50 mg at 07/26/21 0809    minoxidiL (LONITEN) tablet 5 mg, 5 mg, Oral, DAILY, Skedee Fine, NP, 5 mg at 07/26/21 6442    7/27/21 - 2- D echo:   · LV: Calculated LVEF is 65%. Normal cavity size, wall thickness, systolic function (ejection fraction normal) and diastolic function. Wall motion: normal. Normal left ventricular strain. · LA: Dilated left atrium. · TV: Mild to moderate tricuspid valve regurgitation is present. Diastolic function if indeterminate given presence of atrial fibrillation. Given age and dilated left atrium, likely has some degree of diastolic dysfunction. Case was discussed with Dr. Marcie Fuller and our impression and recommendations are as follows:     Chest pain: No active chest pain, complaining of fatigue and lower abdominal pain. Troponins are negative x 4. Serial EKGs are nonischemic. ACS ruled out. Echocardiogram resulted above. Continue telemetry monitoring. Will initiate antianginal therapy with asa.       2. Atrial fibrillation: Rate is 50-110s. Continue metoprolol 50 mg PO daily. Her CHADS2-VASc score is 4; continue coumadin. Continue telemetry monitoring.  Keep serum potassium between 4-5 and serum magnesium > 2.       3. Hypertension: Blood pressures are at goal. Continue current regimen. Thank you for involving us in the care of this patient. Please do not hesitate to call if additional questions arise. Seen in conjunction with Christina Carroll NP above. Agree with findings  - chest pain lasted 12 hrs. Pain across stomach. Rates 90s-120s yet troponins negative x4, echo with preserved EF and no wall motion abnormalities. Likely stage 2 diastolic dysfunction given LAE, age.   - ? Prior (12 years ago h/o CAD). Possible Stress CMY in past now resolved  - Has white count elevation with left shift. Functional decline possible 2/2 UTI vs loss of atrial kick from new onset Afib.   - goal is rate control, source of ?infection control.      Jj Julien MD, RASHI Shipman  Structural Heart Disease  Endovascular and Vascular Medicine  Interventional Cardiology  [facility] Cardiology  [phone]    -

## 2021-07-26 NOTE — PROGRESS NOTES
Hospitalist Progress Note    Subjective:   Daily Progress Note: 7/26/2021 8:49 AM    Hospital Course:  Stefania Roberts is a 80 y.o. female with a  past medical history of HTN, CAD, and atrial fibrillation, who presented to the ED with sharp, substernal chest pain and associated nausea and found to be in atrial fibrillation with RVR. Clement Fly Patient additionally reported experiencing vaginal bleeding while on Eliquis and now on warfarin, she has a gynecology appointment scheduled for evaluation. Given ASA, pain medication and nitroglycerin with relief. Patient has leukocytosis of unclear etiology with an increasing white blood cell count since admission. Tenderness in the right upper quadrant as well as left upper quadrant. CT scan of the abdomen pelvis pending. Right upper quadrant ultrasound pending. Prophylactically will treat with Rocephin. Urine culture and blood culture pending. INR subtherapeutic at 1.4, BNP 4475. x4 Troponins negative. EKG showed afib. CXR showed central pulmonary vascular prominence and coarse interstitial opacities. Chest pain resolved. Cardiology consulted. Echo revealed LVEF 65%, dilated left atrium. Subjective:  Patient reports that her chest pain has resolved. She notes some epigastric abdominal pain, that she believes is left over from having the chest pain. Last bowel movement was last night and was normal. Denies shortness of breath, palpitations, or chest pain.      Current Facility-Administered Medications   Medication Dose Route Frequency    furosemide (LASIX) injection 40 mg  40 mg IntraVENous BID    sodium chloride (NS) flush 5-40 mL  5-40 mL IntraVENous Q8H    sodium chloride (NS) flush 5-40 mL  5-40 mL IntraVENous PRN    acetaminophen (TYLENOL) tablet 650 mg  650 mg Oral Q6H PRN    Or    acetaminophen (TYLENOL) suppository 650 mg  650 mg Rectal Q6H PRN    polyethylene glycol (MIRALAX) packet 17 g  17 g Oral DAILY PRN    bisacodyL (DULCOLAX) tablet 5 mg  5 mg Oral DAILY PRN    ondansetron (ZOFRAN ODT) tablet 4 mg  4 mg Oral Q6H PRN    Or    ondansetron (ZOFRAN) injection 4 mg  4 mg IntraVENous Q6H PRN    famotidine (PEPCID) tablet 20 mg  20 mg Oral BID    enoxaparin (LOVENOX) injection 40 mg  40 mg SubCUTAneous DAILY    atorvastatin (LIPITOR) tablet 20 mg  20 mg Oral DAILY    calcium-vitamin D 600 mg(1,500mg) -200 unit per tablet 1 Tablet  1 Tablet Oral BID    cholecalciferol (VITAMIN D3) (1000 Units /25 mcg) tablet 2,000 Units  2,000 Units Oral DAILY    isosorbide mononitrate ER (IMDUR) tablet 30 mg  30 mg Oral DAILY    levothyroxine (SYNTHROID) tablet 75 mcg  75 mcg Oral ACB    lisinopriL (PRINIVIL, ZESTRIL) tablet 20 mg  20 mg Oral DAILY    metoprolol succinate (TOPROL-XL) XL tablet 50 mg  50 mg Oral DAILY    minoxidiL (LONITEN) tablet 5 mg  5 mg Oral DAILY        Review of Systems  Review of Systems   Constitutional: Negative for chills, fever and weight loss. HENT: Negative. Eyes: Negative. Respiratory: Negative for cough, sputum production, shortness of breath and wheezing. Cardiovascular: Negative for chest pain, palpitations, orthopnea and leg swelling. Gastrointestinal: Positive for abdominal pain (minimal, epigastric and RUQ) and nausea. Negative for constipation, diarrhea and vomiting. Genitourinary: Negative for dysuria, frequency and urgency. Musculoskeletal: Negative. Neurological: Negative. Objective:     Visit Vitals  /61 (BP 1 Location: Right upper arm, BP Patient Position: At rest;Lying)   Pulse 90   Temp 98.3 °F (36.8 °C)   Resp 18   Ht 5' 4\" (1.626 m)   Wt 188 lb (85.3 kg)   SpO2 92%   Breastfeeding No   BMI 32.27 kg/m²           Temp (24hrs), Av.1 °F (37.3 °C), Min:97.4 °F (36.3 °C), Max:100.3 °F (37.9 °C)      No intake/output data recorded.    1901 -  0700  In: -   Out: 3992 [Urine:1375]    Recent Results (from the past 24 hour(s))   EKG, 12 LEAD, INITIAL    Collection Time: 21  9:36 AM   Result Value Ref Range    Ventricular Rate 97 BPM    Atrial Rate 92 BPM    QRS Duration 98 ms    Q-T Interval 352 ms    QTC Calculation (Bezet) 447 ms    Calculated R Axis 15 degrees    Calculated T Axis -43 degrees    Diagnosis       Atrial fibrillation  Nonspecific T wave abnormality  Abnormal ECG  No previous ECGs available  Confirmed by Lynette Stanley ([id number]) on 7/25/2021 10:51:15 AM     CBC WITH AUTOMATED DIFF    Collection Time: 07/25/21  9:50 AM   Result Value Ref Range    WBC 15.0 (H) 3.6 - 11.0 K/uL    RBC 3.79 (L) 3.80 - 5.20 M/uL    HGB 11.0 (L) 11.5 - 16.0 g/dL    HCT 34.0 (L) 35.0 - 47.0 %    MCV 89.7 80.0 - 99.0 FL    MCH 29.0 26.0 - 34.0 PG    MCHC 32.4 30.0 - 36.5 g/dL    RDW 15.7 (H) 11.5 - 14.5 %    PLATELET 927 087 - 321 K/uL    MPV 11.5 8.9 - 12.9 FL    NRBC 0.0 0.0  WBC    ABSOLUTE NRBC 0.00 0.00 - 0.01 K/uL    NEUTROPHILS 89 (H) 32 - 75 %    LYMPHOCYTES 6 (L) 12 - 49 %    MONOCYTES 4 (L) 5 - 13 %    EOSINOPHILS 0 0 - 7 %    BASOPHILS 0 0 - 1 %    IMMATURE GRANULOCYTES 1 (H) 0 - 0.5 %    ABS. NEUTROPHILS 13.4 (H) 1.8 - 8.0 K/UL    ABS. LYMPHOCYTES 0.9 0.8 - 3.5 K/UL    ABS. MONOCYTES 0.7 0.0 - 1.0 K/UL    ABS. EOSINOPHILS 0.0 0.0 - 0.4 K/UL    ABS. BASOPHILS 0.0 0.0 - 0.1 K/UL    ABS. IMM.  GRANS. 0.1 (H) 0.00 - 0.04 K/UL    DF AUTOMATED     PROTHROMBIN TIME + INR    Collection Time: 07/25/21  9:50 AM   Result Value Ref Range    Prothrombin time 17.0 (H) 11.9 - 14.7 sec    INR 1.4 (H) 0.9 - 1.1     PTT    Collection Time: 07/25/21  9:50 AM   Result Value Ref Range    aPTT 31.4 21.2 - 34.1 sec    aPTT, therapeutic range   82 - 735 sec   METABOLIC PANEL, COMPREHENSIVE    Collection Time: 07/25/21 10:50 AM   Result Value Ref Range    Sodium 139 136 - 145 mmol/L    Potassium 3.6 3.5 - 5.1 mmol/L    Chloride 105 97 - 108 mmol/L    CO2 28 21 - 32 mmol/L    Anion gap 6 5 - 15 mmol/L    Glucose 171 (H) 65 - 100 mg/dL    BUN 27 (H) 6 - 20 mg/dL    Creatinine 0.92 0.55 - 1.02 mg/dL    BUN/Creatinine ratio 29 (H) 12 - 20      GFR est AA >60 >60 ml/min/1.73m2    GFR est non-AA 58 (L) >60 ml/min/1.73m2    Calcium 9.4 8.5 - 10.1 mg/dL    Bilirubin, total 1.2 (H) 0.2 - 1.0 mg/dL    AST (SGOT) 33 15 - 37 U/L    ALT (SGPT) 31 12 - 78 U/L    Alk.  phosphatase 52 45 - 117 U/L    Protein, total 6.5 6.4 - 8.2 g/dL    Albumin 3.3 (L) 3.5 - 5.0 g/dL    Globulin 3.2 2.0 - 4.0 g/dL    A-G Ratio 1.0 (L) 1.1 - 2.2     BNP    Collection Time: 07/25/21 10:50 AM   Result Value Ref Range    NT pro-BNP 4,475 (H) <450 pg/mL   TROPONIN I    Collection Time: 07/25/21 10:50 AM   Result Value Ref Range    Troponin-I, Qt. <0.05 <0.05 ng/mL   COVID-19 RAPID TEST    Collection Time: 07/25/21 12:52 PM   Result Value Ref Range    Specimen source Nasopharyngeal      COVID-19 rapid test Not Detected Not Detected     TROPONIN I    Collection Time: 07/25/21  5:53 PM   Result Value Ref Range    Troponin-I, Qt. <0.05 <0.05 ng/mL   TSH 3RD GENERATION    Collection Time: 07/25/21  5:53 PM   Result Value Ref Range    TSH 0.82 0.36 - 3.74 uIU/mL   TROPONIN I    Collection Time: 07/25/21  7:50 PM   Result Value Ref Range    Troponin-I, Qt. <0.05 <0.05 ng/mL   TROPONIN I    Collection Time: 07/26/21 12:42 AM   Result Value Ref Range    Troponin-I, Qt. <0.05 <4.34 ng/mL   METABOLIC PANEL, BASIC    Collection Time: 07/26/21 12:42 AM   Result Value Ref Range    Sodium 136 136 - 145 mmol/L    Potassium 3.2 (L) 3.5 - 5.1 mmol/L    Chloride 100 97 - 108 mmol/L    CO2 30 21 - 32 mmol/L    Anion gap 6 5 - 15 mmol/L    Glucose 148 (H) 65 - 100 mg/dL    BUN 25 (H) 6 - 20 mg/dL    Creatinine 0.93 0.55 - 1.02 mg/dL    BUN/Creatinine ratio 27 (H) 12 - 20      GFR est AA >60 >60 ml/min/1.73m2    GFR est non-AA 57 (L) >60 ml/min/1.73m2    Calcium 8.9 8.5 - 10.1 mg/dL   CBC W/O DIFF    Collection Time: 07/26/21 12:42 AM   Result Value Ref Range    WBC 16.6 (H) 3.6 - 11.0 K/uL    RBC 3.78 (L) 3.80 - 5.20 M/uL    HGB 11.1 (L) 11.5 - 16.0 g/dL    HCT 33.6 (L) 35.0 - 47.0 %    MCV 88.9 80.0 - 99.0 FL    MCH 29.4 26.0 - 34.0 PG    MCHC 33.0 30.0 - 36.5 g/dL    RDW 15.8 (H) 11.5 - 14.5 %    PLATELET 628 102 - 634 K/uL    MPV 10.7 8.9 - 12.9 FL    NRBC 0.0 0.0  WBC    ABSOLUTE NRBC 0.00 0.00 - 0.01 K/uL        XR CHEST PORT   Final Result   Central pulmonary vascular prominence likely accounting for the fullness in the   beatrice. There are also likely coarsened interstitial opacities that could   represent early interstitial edema or other interstitial process. No focal   airspace consolidation is evident. PHYSICAL EXAM:    Physical Exam  Vitals reviewed. Constitutional:       General: She is not in acute distress. Appearance: She is not ill-appearing or diaphoretic. HENT:      Head: Normocephalic and atraumatic. Mouth/Throat:      Mouth: Mucous membranes are moist.      Pharynx: Oropharynx is clear. Eyes:      Conjunctiva/sclera: Conjunctivae normal.      Pupils: Pupils are equal, round, and reactive to light. Cardiovascular:      Rate and Rhythm: Normal rate and regular rhythm. Pulses: Normal pulses. Heart sounds: Normal heart sounds. Pulmonary:      Effort: Pulmonary effort is normal.      Breath sounds: Normal breath sounds. No wheezing, rhonchi or rales. Chest:      Chest wall: No tenderness. Abdominal:      General: Abdomen is flat. There is no distension. Palpations: Abdomen is soft. There is no mass. Tenderness: There is abdominal tenderness (epigastric, moderate to palpation). Musculoskeletal:         General: No tenderness or deformity. Normal range of motion. Skin:     General: Skin is warm. Neurological:      General: No focal deficit present. Mental Status: She is alert and oriented to person, place, and time.        Data Review    Recent Results (from the past 24 hour(s))   EKG, 12 LEAD, INITIAL    Collection Time: 07/25/21  9:36 AM   Result Value Ref Range    Ventricular Rate 97 BPM    Atrial Rate 92 BPM    QRS Duration 98 ms    Q-T Interval 352 ms    QTC Calculation (Bezet) 447 ms    Calculated R Axis 15 degrees    Calculated T Axis -43 degrees    Diagnosis       Atrial fibrillation  Nonspecific T wave abnormality  Abnormal ECG  No previous ECGs available  Confirmed by Filipe Fox ([id number]) on 7/25/2021 10:51:15 AM     CBC WITH AUTOMATED DIFF    Collection Time: 07/25/21  9:50 AM   Result Value Ref Range    WBC 15.0 (H) 3.6 - 11.0 K/uL    RBC 3.79 (L) 3.80 - 5.20 M/uL    HGB 11.0 (L) 11.5 - 16.0 g/dL    HCT 34.0 (L) 35.0 - 47.0 %    MCV 89.7 80.0 - 99.0 FL    MCH 29.0 26.0 - 34.0 PG    MCHC 32.4 30.0 - 36.5 g/dL    RDW 15.7 (H) 11.5 - 14.5 %    PLATELET 278 952 - 942 K/uL    MPV 11.5 8.9 - 12.9 FL    NRBC 0.0 0.0  WBC    ABSOLUTE NRBC 0.00 0.00 - 0.01 K/uL    NEUTROPHILS 89 (H) 32 - 75 %    LYMPHOCYTES 6 (L) 12 - 49 %    MONOCYTES 4 (L) 5 - 13 %    EOSINOPHILS 0 0 - 7 %    BASOPHILS 0 0 - 1 %    IMMATURE GRANULOCYTES 1 (H) 0 - 0.5 %    ABS. NEUTROPHILS 13.4 (H) 1.8 - 8.0 K/UL    ABS. LYMPHOCYTES 0.9 0.8 - 3.5 K/UL    ABS. MONOCYTES 0.7 0.0 - 1.0 K/UL    ABS. EOSINOPHILS 0.0 0.0 - 0.4 K/UL    ABS. BASOPHILS 0.0 0.0 - 0.1 K/UL    ABS. IMM.  GRANS. 0.1 (H) 0.00 - 0.04 K/UL    DF AUTOMATED     PROTHROMBIN TIME + INR    Collection Time: 07/25/21  9:50 AM   Result Value Ref Range    Prothrombin time 17.0 (H) 11.9 - 14.7 sec    INR 1.4 (H) 0.9 - 1.1     PTT    Collection Time: 07/25/21  9:50 AM   Result Value Ref Range    aPTT 31.4 21.2 - 34.1 sec    aPTT, therapeutic range   82 - 247 sec   METABOLIC PANEL, COMPREHENSIVE    Collection Time: 07/25/21 10:50 AM   Result Value Ref Range    Sodium 139 136 - 145 mmol/L    Potassium 3.6 3.5 - 5.1 mmol/L    Chloride 105 97 - 108 mmol/L    CO2 28 21 - 32 mmol/L    Anion gap 6 5 - 15 mmol/L    Glucose 171 (H) 65 - 100 mg/dL    BUN 27 (H) 6 - 20 mg/dL    Creatinine 0.92 0.55 - 1.02 mg/dL    BUN/Creatinine ratio 29 (H) 12 - 20      GFR est AA >60 >60 ml/min/1.73m2    GFR est non-AA 58 (L) >60 ml/min/1.73m2    Calcium 9.4 8.5 - 10.1 mg/dL    Bilirubin, total 1.2 (H) 0.2 - 1.0 mg/dL    AST (SGOT) 33 15 - 37 U/L    ALT (SGPT) 31 12 - 78 U/L    Alk.  phosphatase 52 45 - 117 U/L    Protein, total 6.5 6.4 - 8.2 g/dL    Albumin 3.3 (L) 3.5 - 5.0 g/dL    Globulin 3.2 2.0 - 4.0 g/dL    A-G Ratio 1.0 (L) 1.1 - 2.2     BNP    Collection Time: 07/25/21 10:50 AM   Result Value Ref Range    NT pro-BNP 4,475 (H) <450 pg/mL   TROPONIN I    Collection Time: 07/25/21 10:50 AM   Result Value Ref Range    Troponin-I, Qt. <0.05 <0.05 ng/mL   COVID-19 RAPID TEST    Collection Time: 07/25/21 12:52 PM   Result Value Ref Range    Specimen source Nasopharyngeal      COVID-19 rapid test Not Detected Not Detected     TROPONIN I    Collection Time: 07/25/21  5:53 PM   Result Value Ref Range    Troponin-I, Qt. <0.05 <0.05 ng/mL   TSH 3RD GENERATION    Collection Time: 07/25/21  5:53 PM   Result Value Ref Range    TSH 0.82 0.36 - 3.74 uIU/mL   TROPONIN I    Collection Time: 07/25/21  7:50 PM   Result Value Ref Range    Troponin-I, Qt. <0.05 <0.05 ng/mL   TROPONIN I    Collection Time: 07/26/21 12:42 AM   Result Value Ref Range    Troponin-I, Qt. <0.05 <6.68 ng/mL   METABOLIC PANEL, BASIC    Collection Time: 07/26/21 12:42 AM   Result Value Ref Range    Sodium 136 136 - 145 mmol/L    Potassium 3.2 (L) 3.5 - 5.1 mmol/L    Chloride 100 97 - 108 mmol/L    CO2 30 21 - 32 mmol/L    Anion gap 6 5 - 15 mmol/L    Glucose 148 (H) 65 - 100 mg/dL    BUN 25 (H) 6 - 20 mg/dL    Creatinine 0.93 0.55 - 1.02 mg/dL    BUN/Creatinine ratio 27 (H) 12 - 20      GFR est AA >60 >60 ml/min/1.73m2    GFR est non-AA 57 (L) >60 ml/min/1.73m2    Calcium 8.9 8.5 - 10.1 mg/dL   CBC W/O DIFF    Collection Time: 07/26/21 12:42 AM   Result Value Ref Range    WBC 16.6 (H) 3.6 - 11.0 K/uL    RBC 3.78 (L) 3.80 - 5.20 M/uL    HGB 11.1 (L) 11.5 - 16.0 g/dL    HCT 33.6 (L) 35.0 - 47.0 %    MCV 88.9 80.0 - 99.0 FL    MCH 29.4 26.0 - 34.0 PG    MCHC 33.0 30.0 - 36.5 g/dL    RDW 15.8 (H) 11.5 - 14.5 %    PLATELET 375 029 - 580 K/uL    MPV 10.7 8.9 - 12.9 FL    NRBC 0.0 0.0  WBC    ABSOLUTE NRBC 0.00 0.00 - 0.01 K/uL        Assessment/Plan:     Active Problems:    Unstable angina ([address] (7/25/2021)      Chest pain with moderate risk for cardiac etiology (7/25/2021)      Impression:     1. Chest pain r/o ACS  2. CHF  3. A-fib  4. CAD  5. Hypertension  6. Hyperlipidemia  7. Hypothyroidism  8. Leukocytosis  9. Hypokalemia  10. Vaginal bleeding     Plan:    1. Chest pain r/o ACS  Chest pain has resolved, but some lingering epigastric abdominal pain which was initially thought to be a chest pain equivalent  x4 troponins negative   Cardiology consult pending  Echo revealed LVEF 65%, dilated left atrium. Continue Imdur, metoprolol, although metoprolol has never been increased since her diagnosis of atrial fibrillation and may benefit from increasing medication    2. CHF  CXR showed central pulmonary vascular prominence, interstitial opacities  Given lasix, with improvement  Echo as above     3. A-fib  Holding warfarin in case of need for procedure  Stable     4. CAD  Per daughter, patient had a cardiac cath around 2010 and was found to have a lesion in an arch and instead of stenting, opted to start on antiplatelet at that time  Holding warfarin as above   Not on aspirin although holding due to potential gastritis, GI consultation pending    5. Hypertension  Continue lisinopril, minoxidil     6. Hyperlipidemia  Continue atorvastatin      7. Hypothyroidism  Continue synthroid  TSH within normal limits     8. Leukocytosis with abdominal pain  WBC 15.0 -> 16.6   UA  Urine Culture  Blood culture  CT scan of the abdomen pelvis  Right upper quadrant ultrasound  Rocephin empirically     9. Hypokalemia   K 3.2   Replace      10.  Vaginal bleeding   S/p JOANN secondary to endometrial cancer  Per pt, bleeding with clots noticed after starting anticoagulation  She has an outpatient gynecology appointment scheduled in the near future  Gynecology consult     DVT Prophylaxis: Lovenox   GI Prophylaxis:Pepcid   Disposition: pending cardiology consult   Code Status:Full Code  POA/NOK:daughter    Discharge barriers: Management of the atrial fibrillation with rate control, CT of the abdomen pelvis to be performed, blood cultures pending, infectious work-up, gynecology consult    Care Plan discussed with: Patient/Family    Total time spent with patient: >35 minutes. Addendum:    Worsening blood pressure and a fib with RVR. This unfortunately resulted in decompensation and mental status changes. Patient given IV fluid bolus with minimal improvement. Echocardiogram revealed low right-sided pressures most likely related to overdiuresis and hypovolemia. We will give 1 L of IV fluids and transfer to the ICU for phenylephrine. Amiodarone bolus ordered for improvement in overall heart rate as may be contributing to decompensated heart failure. Chest x-ray pending. Procalcitonin elevated. Discontinue Rocephin and start cefepime and vancomycin with doxycycline for atypicals. Blood culture pending, urine culture pending.   Triple-lumen catheter inserted emergently at the bedside    Critical care time 45 minutes

## 2021-07-26 NOTE — PROGRESS NOTES
OT eval order received and acknowledged. OT eval attempted at 3:53, pt seen semi-supine in bed, drowsy, but alert with conversation; family members present at bedside. BP noted to be low earlier, therefore vitals taken prior to evaluation. BP: 91/49 in supine, HR: 101-120 at rest, O2 sats 83-84% on RA. RN made aware. OT eval deferred at this time. Will continue to follow patient and attempt OT eval at a later time as medically appropriate. Thank you.

## 2021-07-26 NOTE — CONSULTS
Gynecology History and Physical    Name: Denise Tiwari MRN: [id number] SSN: [id number]    YOB: 1933  Age: 80 y.o. Sex: female       Subjective:      Chief complaint:  Vaginal bleeding     Froylan Chavis is an  80 y.o.  female with a history of total abdominal hysterectomy and bilateral salpingo-oophorectomy in 1999 for endometrial ca, s/p RT, who presented to the ER for chest pain. She has history of CAD, A-fib, HTN and was found to have leucocytosis, amongst other lab abnormalities and central pulmonary vascular prominence, and coarsened interstitial opacities on imaging. She was admitted to the hospitalist service for evaluation and management. Gynecology consulted due to patient's complaint of vaginal bleeding. Patient's daughter and son are present in the room and contributing to HPI. Patient was previously on Plavix for a-fib. She was recently changed to Eliquis. Soon after this change, patient started noticing red blood with wiping. She was uncertain if it was urinary, vaginal or rectal. Onset approximately 1 month ago. First incident of this sort. She has since been changed to Warfarin due to cost of medication but is still noticing this bleeding. Per patient's daughter, it would sometimes stain the toilet bowl red after voiding. Some blood clots also noted on pad. Patient denies abdominal pain with the bleeding. OB History    No obstetric history on file.        Past Medical History:   Diagnosis Date    Atrial fibrillation ([facility] [address]     CAD (coronary artery disease)     Diabetes mellitus ([facility] [address]     resolved    Endometrial cancer ([facility] [address]     s/p TAHBSO, RT in 1990s    Hypertension     Skin cancer      Past Surgical History:   Procedure Laterality Date    HX HYSTERECTOMY       Social History     Occupational History    Not on file   Tobacco Use    Smoking status: Former Smoker    Smokeless tobacco: Never Used    Tobacco comment: quit >50 yrs ago   Substance and Sexual Activity    Alcohol use: Not on file    Drug use: Never    Sexual activity: Not on file     Family History   Problem Relation Age of Onset    Hypertension Father     Cancer Father     Hypertension Brother     Ovarian Cancer Other         daughter, unsure if genetic testing performed        Allergies   Allergen Reactions    Penicillins Unknown (comments)     Prior to Admission medications    Medication Sig Start Date End Date Taking? Authorizing Provider   warfarin (Coumadin) 5 mg tablet Take 5 mg by mouth daily. Yes Provider, Historical   atorvastatin (LIPITOR) 20 mg tablet Take 20 mg by mouth daily. Yes Provider, Historical   lisinopriL (PRINIVIL, ZESTRIL) 20 mg tablet Take 20 mg by mouth daily. Yes Provider, Historical   metoprolol succinate (TOPROL-XL) 50 mg XL tablet Take 50 mg by mouth daily. Provider, Historical   hydroCHLOROthiazide (HYDRODIURIL) 25 mg tablet Take 25 mg by mouth daily. Provider, Historical   niacin (NIASPAN) 1,000 mg Tb24 tab Take 1,000 mg by mouth nightly. Provider, Historical   minoxidiL (LONITEN) 10 mg tablet Take 5 mg by mouth daily. Provider, Historical   isosorbide mononitrate ER (IMDUR) 30 mg tablet Take 30 mg by mouth daily. Provider, Historical   cholecalciferol, vitamin D3, (Vitamin D3) 50 mcg (2,000 unit) tab Take 2,000 Units by mouth. Provider, Historical   Calcium-Cholecalciferol, D3, 600 mg(1,500mg) -400 unit chew Take 1,500 mg by mouth. Provider, Historical   levothyroxine (SYNTHROID) 75 mcg tablet Take 75 mcg by mouth Daily (before breakfast). Provider, Historical        Review of Systems  REVIEW OF SYSTEMS: CONSTITUTIONAL: weakness, fatigue, Denies: fever  CARDIOVASCULAR: chest pain, dyspnea on exertion  RESPIRATORY: shortness of breath  ENDOCRINE: previously had DM but resolved  GI: abdominal pain, nausea, vomiting, Denies: abdominal pain, diarrhea, constipation  : Denies: dysuria.  Unsure if hematuria  NEURO: +weakness  MUSCULOSKELETAL: muscle pain and weakness  SKIN: Denies: rash  PSYCH: Denies: denies anxiety or depression    Objective:     Vitals:    07/26/21 1200 07/26/21 1205 07/26/21 1600 07/26/21 1648   BP:  (!) 98/44  (!) 84/45   Pulse: (!) 118 (!) 118 (!) 120 (!) 120   Resp:  18  18   Temp:  99.5 °F (37.5 °C)  98.2 °F (36.8 °C)   SpO2:  92%  94%   Weight:       Height:           Physical Exam:  Visit Vitals  BP (!) 84/45 (BP Patient Position: At rest)   Pulse (!) 120   Temp 98.2 °F (36.8 °C)   Resp 18   Ht 5' 4\" (1.626 m)   Wt 85.3 kg (188 lb)   SpO2 94%   Breastfeeding No   BMI 32.27 kg/m²     General:  Appears fatigued, cooperative, no acute distress. Head:  Normocephalic, without obvious abnormality   Eyes:   Extraocular movements intact. Chest : increased respiratory effort   Abdomen:   Bit firm towards mid/rt lower abdomen, non-tender. Extremities: Extremities normal.     :  Atrophic vulva appropriate for age. Speculum exam shows no vaginal masses. Vaginal atrophy noted. Vaginal apex bleeding slightly with light touch. Scant light brown tinged discharge noted. Bimanual does not reveal any masses. Rectal exam reveals firm stool but no overt rectal masses. No blood noted on gloved finger. Skin: Pale; some areas of ecchymosis noted on upper extremities   Psych: Mood appropriate         Assessment/Plan:     Active Problems:    Unstable angina ([address] (7/25/2021)      Chest pain with moderate risk for cardiac etiology (7/25/2021)     Vaginal bleeding        -Per vaginal bleeding, discussed following up CT scan results. Recommend patient f/u outpatient for vaginal biopsy due to h/o endometrial cancer.  Patient has appt set up with her gyn-onc doctor for August. Discussed possible etiologies to include but not be limited to malignancy, iatrogenic from anticoagulation, vaginal atrophy, etc. Discussed possibility of the bleeding being from the bladder or the rectum and how work up would be beneficial.   -Patient currently being transferred to ICU due to hypotension and hypovolemia and acute decompensation.  -Medicine is the primary team.

## 2021-07-26 NOTE — CONSULTS
Consult    Patient: Alysia Judd MRN: [id number]  SSN: [id number]    YOB: 1933  Age: 80 y.o. Sex: female      Subjective:      Alysia Judd is a 80 y.o. female who is being seen for RUQ abdominal pain. Some of History from chart due to difficulty to hearing  She presented to the ED with sharp, substernal chest pain and associated nausea and found to be in atrial fibrillation with RVR. . she also had vaginal bleeding but she has been on warfarin,    Patient has leukocytosis] has tnderness in the right upper quadrant as well as left upper quadrant. US scan of the abdomen pelvis pending for results. Chest pain resolved,abdominal pain remained same, blood pressure 12 below the normal before the CT stage,   now patient is in ICU . impression is better, Cardiology consulted for ACS an A fib     Past Medical History:   Diagnosis Date    Atrial fibrillation ([facility] [address]     CAD (coronary artery disease)     Endometrial cancer ([facility] [address]     Hypertension      Past Surgical History:   Procedure Laterality Date    HX HYSTERECTOMY        Family History   Problem Relation Age of Onset    Hypertension Father     Cancer Father     Hypertension Brother      Social History     Tobacco Use    Smoking status: Never Smoker   Substance Use Topics    Alcohol use: Not on file      Current Facility-Administered Medications   Medication Dose Route Frequency Provider Last Rate Last Admin    0.9% sodium chloride infusion  75 mL/hr IntraVENous CONTINUOUS Esthela Garcia PA-C 75 mL/hr at 07/26/21 1400 75 mL/hr at 07/26/21 1400    potassium chloride (K-DUR, KLOR-CON) SR tablet 40 mEq  40 mEq Oral BID Jose Murillo PA-C        [Held by provider] furosemide (LASIX) injection 40 mg  40 mg IntraVENous DAILY Kiara Murillo PA-C        amiodarone (CORDARONE) 150 mg in dextrose 5% 100 mL bolus infusion  150 mg IntraVENous ONCE Jose Murillo PA-C        doxycycline (VIBRAMYCIN) 100 mg in 0.9% sodium chloride (MBP/ADV) 100 mL MBP  100 mg IntraVENous Q12H Bruno Murillo PA-C        sodium chloride 0.9 % bolus infusion 1,000 mL  1,000 mL IntraVENous ONCE Bruno Murillo PA-C        PHENYLephrine (SARWAT-SYNEPHRINE) 30 mg in 0.9% sodium chloride 250 mL infusion   mcg/min IntraVENous TITRATE Bruno Murillo PA-C        sodium chloride (NS) flush 5-40 mL  5-40 mL IntraVENous Q8H Mehran Lopez NP   10 mL at 07/26/21 1400    sodium chloride (NS) flush 5-40 mL  5-40 mL IntraVENous PRN Mehran Lopez NP        acetaminophen (TYLENOL) tablet 650 mg  650 mg Oral Q6H PRN Mehran Lopez NP        Or   Rajesh Koroma acetaminophen (TYLENOL) suppository 650 mg  650 mg Rectal Q6H PRN Mehran Lopez NP        polyethylene glycol (MIRALAX) packet 17 g  17 g Oral DAILY PRN Mehran Lopez NP        bisacodyL (DULCOLAX) tablet 5 mg  5 mg Oral DAILY PRN Mehran Lopez NP        ondansetron (ZOFRAN ODT) tablet 4 mg  4 mg Oral Q6H PRN Mehran Lopez NP        Or    [facility] injection 4 mg  4 mg IntraVENous Q6H PRN Mehran Lopez NP   4 mg at 07/26/21 1235    famotidine (PEPCID) tablet 20 mg  20 mg Oral BID Mehran Lopez NP   20 mg at 07/26/21 0555    enoxaparin (LOVENOX) injection 40 mg  40 mg SubCUTAneous DAILY Mehran Lopez NP   40 mg at 07/26/21 3621    atorvastatin (LIPITOR) tablet 20 mg  20 mg Oral DAILY Mehran Lopez NP   20 mg at 07/26/21 3854    calcium-vitamin D 600 mg(1,500mg) -200 unit per tablet 1 Tablet  1 Tablet Oral BID Mehran Lopez NP   1 Tablet at 07/26/21 4370    cholecalciferol (VITAMIN D3) (1000 Units /25 mcg) tablet 2,000 Units  2,000 Units Oral DAILY Mehran Lopez NP   2,000 Units at 07/26/21 2379    [Held by provider] isosorbide mononitrate ER (IMDUR) tablet 30 mg  30 mg Oral DAILY Mehran Lopez NP   30 mg at 07/26/21 0811    levothyroxine (SYNTHROID) tablet 75 mcg  75 mcg Oral ACB Mehran Lopez NP   75 mcg at 07/26/21 0811    [Held by provider] lisinopriL (PRINIVIL, ZESTRIL) tablet 20 mg  20 mg Oral DAILY Jennifer Anthony NP   20 mg at 07/26/21 3811    metoprolol succinate (TOPROL-XL) XL tablet 50 mg  50 mg Oral DAILY Jennifer Modicasper NP   50 mg at 07/26/21 0809    [Held by provider] minoxidiL (LONITEN) tablet 5 mg  5 mg Oral DAILY Jennifer Anthony NP   5 mg at 07/26/21 3741        Allergies   Allergen Reactions    Penicillins Unknown (comments)       Review of Systems:  Review of Systems   Unable to perform ROS: Medical condition   Constitutional: Negative. Negative for malaise/fatigue. HENT: Negative. Eyes: Negative. Respiratory: Negative. Cardiovascular: Positive for chest pain and palpitations. Gastrointestinal: Positive for abdominal pain. Negative for blood in stool and heartburn. Genitourinary: Negative. Musculoskeletal: Negative. Skin: Negative. Neurological: Positive for dizziness. Psychiatric/Behavioral: Negative. Negative for depression and hallucinations. Objective:     Vitals:    07/26/21 1200 07/26/21 1205 07/26/21 1600 07/26/21 1648   BP:  (!) 98/44  (!) 84/45   Pulse: (!) 118 (!) 118 (!) 120 (!) 120   Resp:  18  18   Temp:  99.5 °F (37.5 °C)  98.2 °F (36.8 °C)   SpO2:  92%  94%   Weight:       Height:            Physical Exam:  Physical Exam  Constitutional:       Appearance: She is ill-appearing. HENT:      Head: Normocephalic and atraumatic. Left Ear: Tympanic membrane normal.      Mouth/Throat:      Mouth: Mucous membranes are moist.   Eyes:      Extraocular Movements: Extraocular movements intact. Conjunctiva/sclera: Conjunctivae normal.      Pupils: Pupils are equal, round, and reactive to light. Neck:      Vascular: No carotid bruit. Cardiovascular:      Rate and Rhythm: Normal rate and regular rhythm. Pulses: Normal pulses. Pulmonary:      Effort: Pulmonary effort is normal.      Breath sounds: Normal breath sounds. No wheezing.    Abdominal:      General: Abdomen is flat. Bowel sounds are normal. There is no distension. Palpations: There is no mass. Tenderness: There is abdominal tenderness. There is no left CVA tenderness or rebound. Hernia: No hernia is present. Musculoskeletal:         General: No deformity. Normal range of motion. Cervical back: Normal range of motion. No tenderness. Right lower leg: No edema. Skin:     Coloration: Skin is not jaundiced. Findings: No lesion. Neurological:      General: No focal deficit present.    Psychiatric:         Mood and Affect: Mood normal.          Recent Results (from the past 24 hour(s))   TROPONIN I    Collection Time: 07/25/21  7:50 PM   Result Value Ref Range    Troponin-I, Qt. <0.05 <0.05 ng/mL   TROPONIN I    Collection Time: 07/26/21 12:42 AM   Result Value Ref Range    Troponin-I, Qt. <0.05 <3.77 ng/mL   METABOLIC PANEL, BASIC    Collection Time: 07/26/21 12:42 AM   Result Value Ref Range    Sodium 136 136 - 145 mmol/L    Potassium 3.2 (L) 3.5 - 5.1 mmol/L    Chloride 100 97 - 108 mmol/L    CO2 30 21 - 32 mmol/L    Anion gap 6 5 - 15 mmol/L    Glucose 148 (H) 65 - 100 mg/dL    BUN 25 (H) 6 - 20 mg/dL    Creatinine 0.93 0.55 - 1.02 mg/dL    BUN/Creatinine ratio 27 (H) 12 - 20      GFR est AA >60 >60 ml/min/1.73m2    GFR est non-AA 57 (L) >60 ml/min/1.73m2    Calcium 8.9 8.5 - 10.1 mg/dL   CBC W/O DIFF    Collection Time: 07/26/21 12:42 AM   Result Value Ref Range    WBC 16.6 (H) 3.6 - 11.0 K/uL    RBC 3.78 (L) 3.80 - 5.20 M/uL    HGB 11.1 (L) 11.5 - 16.0 g/dL    HCT 33.6 (L) 35.0 - 47.0 %    MCV 88.9 80.0 - 99.0 FL    MCH 29.4 26.0 - 34.0 PG    MCHC 33.0 30.0 - 36.5 g/dL    RDW 15.8 (H) 11.5 - 14.5 %    PLATELET 397 404 - 505 K/uL    MPV 10.7 8.9 - 12.9 FL    NRBC 0.0 0.0  WBC    ABSOLUTE NRBC 0.00 0.00 - 0.01 K/uL   ECHO ADULT COMPLETE    Collection Time: 07/26/21 10:30 AM   Result Value Ref Range    Aortic Valve Systolic Peak Velocity 346.15 cm/s    AoV PG 10.00 mmHg Ao Root D 3.30 cm    IVSd 1.48 (A) 0.60 - 0.90 cm    LVIDd 4.45 3.90 - 5.30 cm    LVIDs 2.87 cm    LVOT Peak Velocity 116.00 cm/s    LVOT Peak Gradient 5.00 mmHg    LVPWd 1.44 (A) 0.60 - 0.90 cm    LV E' Septal Velocity 10.20 cm/s    LV ED Vol A2C 88.10 cm3    LV ES Vol A2C 23.60 cm3    Left Atrium Major Axis 4.80 cm    Mitral Valve Deceleration Gage 5,320.00 mm/s2    Mitral Valve Deceleration Gage 5,320.00 mm/s2    Mitral Valve Pressure Half-time 84.00 ms    MV Mean Gradient 2.00 mmHg    Mitral Valve Annulus Velocity Time Integral 39.30 cm    Mitral Valve Max Velocity 159.00 cm/s    MV Peak Gradient 10.00 mmHg    MVA (PHT) 2.62 cm2    Pulmonic Valve Max Velocity 122.00 cm/s    Pulmonic Valve Systolic Peak Instantaneous Gradient 6.00 mmHg    Est. RA Pressure 3.00 mmHg    RVIDd 2.82 cm    RVSP 39.00 mmHg    Tricuspid Valve Max Velocity 301.00 cm/s    Triscuspid Valve Regurgitation Peak Gradient 36.00 mmHg    Right Atrial Area 4C 21.31 cm2    LA Area 4C 20.92 cm2    BP EF 65.1 55.0 - 100.0 %    LV Mass .3 67.0 - 162.0 g    LV Mass AL Index 136.3 43.0 - 95.0 g/m2    Left Atrium Minor Axis 2.51 cm   PROCALCITONIN    Collection Time: 07/26/21  4:33 PM   Result Value Ref Range    Procalcitonin 1.11 (H) 0 ng/mL        XR CHEST PORT   Final Result      XR CHEST PORT   Final Result   Central pulmonary vascular prominence likely accounting for the fullness in the   beatrice. There are also likely coarsened interstitial opacities that could   represent early interstitial edema or other interstitial process. No focal   airspace consolidation is evident.       CT ABDOMEN W WO CONT AND PELVIS W CONT    (Results Pending)   US RUQ    (Results Pending)      Assessment:     Hospital Problems  Never Reviewed        Codes Class Noted POA    Unstable angina ([facility] [address] ICD-10-CM: I20.0  ICD-9-CM: 411.1  7/25/2021 Unknown        Chest pain with moderate risk for cardiac etiology ICD-10-CM: R07.9  ICD-9-CM: 786.50  7/25/2021 Unknown Chest pain r/o ACS  Chest pain has resolved,   x4 troponins negative     some epigastric abdominal pain   Has  aspirin      Plan:   ICU monitoring HR , on amiodarone iv drip   Hold ASA,   On H 2 blocker  Continue on CHF, CAD treatment   closing monitoring abdominal pain,   check lactic acids to rule out ischemia bowel   Wait for CT results to see if further GI study is needed.      Signed By: Zuhair Medley MD     July 26, 2021         Thank you for allowing me to participate in this patients care  Cc Referring Physician   Joselyn Mobley NP

## 2021-07-26 NOTE — PROGRESS NOTES
PHYSICAL THERAPY EVALUATION  Patient: Delores Dillard (80 y.o. female)  Date: 7/26/2021  Primary Diagnosis: Unstable angina ([address] [I20.0]  Chest pain with moderate risk for cardiac etiology [R07.9]  Chest pain [R07.9]  Shortness of breath [R06.02]  Fluid overload [E87.70]        Precautions:  falls    ASSESSMENT  This is a 81 y/o female came to  [address] [place] [address] ED with c/o chest pain. Chest pain started last night around 2100 located substernally and in her RUQ and LUQ  , admitted on 7/25/21 for unstable angina , chest pain , SOB , and  fluid overload  . Pt has PMH of  past medical history of HTN, CAD,and  afib , had cardiac cath 11 years ago and found to have a lesion in an arch and instead of stenting, opted to start on anticoagulation. Pt received semi-supine in bed , daughter in the room ,  agreeable for PT eval . Pt is A& O x 4 , as per pt report , she lives alone in a single story home with 2 steps to enter from garage  ,no  HR , holds on to the door when entering ,   IND with ADL's and mod I for functional transfers/mobility with no AD prior to admission . Per daughter , pt does furniture walking and is hesitant to use any AD . Based on the objective data described below, the patient presents with c/o pain in  upper abdomen -5/10 ,  decreased strength ,   2+/ 5 grossly in  b/l LE , balance deficits , generalized weakness , decreased safety awareness , decreased activity tolerance and increased need for  assist with functional mobility ( needs Yuly for rolling from side to side  , modA for supine <>sit transfers , fair static sitting balance with support  , Yuly for sit <>stand  , fair static  standing balance with constant support , is able to ambulate - 15' with RW, Yuly with slow caron and  decreased step length  b/l Le . [place] during ambulation , pt had episode of urinary incontinence , changed gown and socks , pt  IND with perineal care ) .  Pt would benefit from skilled PT services while at [address] [place] [address] in order to increase safety and independence with functional transfers/mobility. Recommend d/c to SNF when medically appropriate . Current Level of Function Impacting Discharge (mobility/balance): Requires  Harriet for functional transfers/mobility. Functional Outcome Measure: The patient scored 17 on the AM PAC basic mobility outcome measure which is indicative of medium mobility. Other factors to consider for discharge: severity of deficits , time since onset        PLAN :  Recommendations and Planned Interventions: bed mobility training, transfer training, gait training, therapeutic exercises, neuromuscular re-education, patient and family training/education and therapeutic activities      Frequency/Duration: Patient will be followed by physical therapy:  5 times a week to address goals. Recommendation for discharge: (in order for the patient to meet his/her long term goals)  SNF    This discharge recommendation:  Has been made in collaboration with the attending provider and/or case management    IF patient discharges home will need the following DME: to be determined (TBD)         SUBJECTIVE:   Patient stated I feel weak     OBJECTIVE DATA SUMMARY:   HISTORY:    Past Medical History:   Diagnosis Date    Atrial fibrillation ([address]     CAD (coronary artery disease)     [facility]rial cancer ([facility]     Hypertension      Past Surgical History:   Procedure Laterality Date    HX HYSTERECTOMY         Personal factors and/or comorbidities impacting plan of care:     Home Situation  Home Environment: Private residence  # Steps to Enter: 2 (Pt uses steps from from garage )  Rails to [employer]: No  One/Two Story Residence: One story  Living Alone: Yes  Support Systems: Family member(s)  Patient Expects to be Discharged to[de-identified] House  Current DME Used/Available at Home: None    PLOF: Pt IND for ADLS/IADLS, mod I with mobility (furniture walking)  prior to admission.      EXAMINATION/PRESENTATION/DECISION MAKING:   Critical Behavior:  Neurologic State: Alert  Orientation Level: Oriented X4     Safety/Judgement: Decreased insight into deficits  Hearing: Auditory  Auditory Impairment: Hard of hearing, right side  Skin:  Intact where exposed    Range Of Motion:  AROM: Generally decreased, functional  Strength:    Strength: Generally decreased, functional (2+/5 grossly)  Tone & Sensation:   Tone: Normal  Sensation: Intact     Functional Mobility:  Bed Mobility:  Rolling: Minimum assistance  Supine to Sit: Moderate assistance  Sit to Supine: Moderate assistance  Scooting: Contact guard assistance  Transfers:  Sit to Stand: Minimum assistance  Stand to Sit: Minimum assistance  Balance:   Sitting: Impaired; Without support  Sitting - Static: Fair (occasional)  Sitting - Dynamic: Fair (occasional)  Standing: Impaired; Without support  Standing - Static: Fair;Constant support  Standing - Dynamic : Fair;Constant support  Ambulation/Gait Training:  Distance (ft): 12 Feet (ft)  Assistive Device: Walker, rolling  Ambulation - Level of Assistance: Contact guard assistance;Minimal assistance  Speed/Alissa: Slow  Step Length: Right shortened;Left shortened      Functional Measure:    [employer] AM-PAC 6 Clicks         Basic Mobility Inpatient Short Form  How much difficulty does the patient currently have. .. Unable A Lot A Little None   1. Turning over in bed (including adjusting bedclothes, sheets and blankets)? [] 1   [] 2   [x] 3   [] 4   2. Sitting down on and standing up from a chair with arms ( e.g., wheelchair, bedside commode, etc.)   [] 1   [] 2   [x] 3   [] 4   3. Moving from lying on back to sitting on the side of the bed? [] 1   [] 2   [x] 3   [] 4          How much help from another person does the patient currently need. .. Total A Lot A Little None   4. Moving to and from a bed to a chair (including a wheelchair)? [] 1   [] 2   [x] 3   [] 4   5. Need to walk in hospital room? [] 1   [] 2   [x] 3   [] 4   6. Climbing 3-5 steps with a railing? [] 1   [x] 2   [] 3   [] 4   © , Trustees of [facility], under license to [employer]. All rights reserved     Score:  17 Most Recent: X (Date: 21-- )   Interpretation of Tool:  Represents activities that are increasingly more difficult (i.e. Bed mobility, Transfers, Gait). Score 24 23 22-20 19-15 14-10 9-7 6   Modifier CH CI CJ CK CL CM CN          Physical Therapy Evaluation Charge Determination   History Examination Presentation Decision-Making   MEDIUM  Complexity : 1-2 comorbidities / personal factors will impact the outcome/ POC  MEDIUM Complexity : 3 Standardized tests and measures addressing body structure, function, activity limitation and / or participation in recreation  LOW Complexity : Stable, uncomplicated  Other Functional Measure [facility] 6 medium complexity      Based on the above components, the patient evaluation is determined to be of the following complexity level: LOW     Pain Ratin/10 at upper abdomen on movement    Activity Tolerance:   Fair  Please refer to the flowsheet for vital signs taken during this treatment. After treatment patient left in no apparent distress:   Supine in bed, Call bell within reach, Bed / chair alarm activated and Caregiver / family present    COMMUNICATION/EDUCATION:   The patients plan of care was discussed with: Registered nurse. Fall prevention education was provided and the patient/caregiver indicated understanding. and Patient/family agree to work toward stated goals and plan of care. Problem: Mobility Impaired (Adult and Pediatric)  Goal: *Acute Goals and Plan of Care (Insert Text)  Description: Pt will be I with LE HEP in 7 days. Pt will perform bed mobility with mod I in 7 days. Pt will perform transfers with mod I in 7 days. Pt will amb -100 feet with LRAD safely with mod I in 7 days.    Outcome: Not Met       Thank you for this referral.  Uri Leong   Time Calculation: 49 mins

## 2021-07-27 ENCOUNTER — APPOINTMENT (OUTPATIENT)
Dept: INTERVENTIONAL RADIOLOGY/VASCULAR | Age: 86
DRG: 871 | End: 2021-07-27
Attending: COLON & RECTAL SURGERY
Payer: MEDICARE

## 2021-07-27 LAB
ALBUMIN SERPL-MCNC: 2.3 G/DL (ref 3.5–5)
ALBUMIN/GLOB SERPL: 0.7 {RATIO} (ref 1.1–2.2)
ALP SERPL-CCNC: 50 U/L (ref 45–117)
ALT SERPL-CCNC: 31 U/L (ref 12–78)
ANION GAP SERPL CALC-SCNC: 6 MMOL/L (ref 5–15)
AST SERPL W P-5'-P-CCNC: 31 U/L (ref 15–37)
BASOPHILS # BLD: 0 K/UL (ref 0–0.1)
BASOPHILS NFR BLD: 0 % (ref 0–1)
BILIRUB SERPL-MCNC: 1.8 MG/DL (ref 0.2–1)
BUN SERPL-MCNC: 41 MG/DL (ref 6–20)
BUN/CREAT SERPL: 23 (ref 12–20)
CA-I BLD-MCNC: 7.4 MG/DL (ref 8.5–10.1)
CALCIUM UR-MCNC: <5 MG/DL
CHLORIDE SERPL-SCNC: 101 MMOL/L (ref 97–108)
CO2 SERPL-SCNC: 26 MMOL/L (ref 21–32)
CREAT SERPL-MCNC: 1.81 MG/DL (ref 0.55–1.02)
CRP SERPL-MCNC: 29.6 MG/DL (ref 0–0.6)
DIFFERENTIAL METHOD BLD: ABNORMAL
EOSINOPHIL # BLD: 0 K/UL (ref 0–0.4)
EOSINOPHIL NFR BLD: 0 % (ref 0–7)
ERYTHROCYTE [DISTWIDTH] IN BLOOD BY AUTOMATED COUNT: 15.9 % (ref 11.5–14.5)
GLOBULIN SER CALC-MCNC: 3.2 G/DL (ref 2–4)
GLUCOSE SERPL-MCNC: 195 MG/DL (ref 65–100)
HCT VFR BLD AUTO: 27.9 % (ref 35–47)
HGB BLD-MCNC: 9.3 G/DL (ref 11.5–16)
IMM GRANULOCYTES # BLD AUTO: 0.3 K/UL (ref 0–0.04)
IMM GRANULOCYTES NFR BLD AUTO: 2 % (ref 0–0.5)
LYMPHOCYTES # BLD: 0.4 K/UL (ref 0.8–3.5)
LYMPHOCYTES NFR BLD: 2 % (ref 12–49)
MAGNESIUM SERPL-MCNC: 1.3 MG/DL (ref 1.6–2.4)
MAGNESIUM UR-MCNC: 2.6 MG/DL
MCH RBC QN AUTO: 29.5 PG (ref 26–34)
MCHC RBC AUTO-ENTMCNC: 33.3 G/DL (ref 30–36.5)
MCV RBC AUTO: 88.6 FL (ref 80–99)
MONOCYTES # BLD: 0.7 K/UL (ref 0–1)
MONOCYTES NFR BLD: 4 % (ref 5–13)
NEUTS SEG # BLD: 15.8 K/UL (ref 1.8–8)
NEUTS SEG NFR BLD: 92 % (ref 32–75)
NRBC # BLD: 0 K/UL (ref 0–0.01)
NRBC BLD-RTO: 0 PER 100 WBC
PLATELET # BLD AUTO: 126 K/UL (ref 150–400)
PMV BLD AUTO: 10.5 FL (ref 8.9–12.9)
POTASSIUM SERPL-SCNC: 3.5 MMOL/L (ref 3.5–5.1)
PROCALCITONIN SERPL-MCNC: 1.16 NG/ML
PROT SERPL-MCNC: 5.5 G/DL (ref 6.4–8.2)
RBC # BLD AUTO: 3.15 M/UL (ref 3.8–5.2)
SODIUM SERPL-SCNC: 133 MMOL/L (ref 136–145)
URATE UR-MCNC: 12.3 MG/DL
UUN UR-MCNC: 169 MG/DL
VANCOMYCIN SERPL-MCNC: 14.7 UG/ML
WBC # BLD AUTO: 17.2 K/UL (ref 3.6–11)

## 2021-07-27 PROCEDURE — 77030038637 IR CHOLECYSTOSTOMY PERCUTANEOUS

## 2021-07-27 PROCEDURE — 80202 ASSAY OF VANCOMYCIN: CPT

## 2021-07-27 PROCEDURE — 74011250636 HC RX REV CODE- 250/636: Performed by: INTERNAL MEDICINE

## 2021-07-27 PROCEDURE — 65610000006 HC RM INTENSIVE CARE

## 2021-07-27 PROCEDURE — 74011250636 HC RX REV CODE- 250/636: Performed by: NURSE PRACTITIONER

## 2021-07-27 PROCEDURE — 87077 CULTURE AEROBIC IDENTIFY: CPT

## 2021-07-27 PROCEDURE — 85025 COMPLETE CBC W/AUTO DIFF WBC: CPT

## 2021-07-27 PROCEDURE — 74011000258 HC RX REV CODE- 258: Performed by: INTERNAL MEDICINE

## 2021-07-27 PROCEDURE — 87186 SC STD MICRODIL/AGAR DIL: CPT

## 2021-07-27 PROCEDURE — 74011000250 HC RX REV CODE- 250: Performed by: INTERNAL MEDICINE

## 2021-07-27 PROCEDURE — 99222 1ST HOSP IP/OBS MODERATE 55: CPT | Performed by: COLON & RECTAL SURGERY

## 2021-07-27 PROCEDURE — 77010033678 HC OXYGEN DAILY

## 2021-07-27 PROCEDURE — 86140 C-REACTIVE PROTEIN: CPT

## 2021-07-27 PROCEDURE — 80053 COMPREHEN METABOLIC PANEL: CPT

## 2021-07-27 PROCEDURE — 84145 PROCALCITONIN (PCT): CPT

## 2021-07-27 PROCEDURE — 74011250636 HC RX REV CODE- 250/636: Performed by: PHYSICIAN ASSISTANT

## 2021-07-27 PROCEDURE — 74011250636 HC RX REV CODE- 250/636: Performed by: RADIOLOGY

## 2021-07-27 PROCEDURE — 36415 COLL VENOUS BLD VENIPUNCTURE: CPT

## 2021-07-27 PROCEDURE — 83735 ASSAY OF MAGNESIUM: CPT

## 2021-07-27 PROCEDURE — 87205 SMEAR GRAM STAIN: CPT

## 2021-07-27 PROCEDURE — 74011250637 HC RX REV CODE- 250/637: Performed by: NURSE PRACTITIONER

## 2021-07-27 PROCEDURE — 74011000636 HC RX REV CODE- 636: Performed by: INTERNAL MEDICINE

## 2021-07-27 PROCEDURE — 74011000258 HC RX REV CODE- 258: Performed by: PHYSICIAN ASSISTANT

## 2021-07-27 PROCEDURE — 74011250637 HC RX REV CODE- 250/637: Performed by: COLON & RECTAL SURGERY

## 2021-07-27 PROCEDURE — 0F9430Z DRAINAGE OF GALLBLADDER WITH DRAINAGE DEVICE, PERCUTANEOUS APPROACH: ICD-10-PCS | Performed by: RADIOLOGY

## 2021-07-27 RX ORDER — FUROSEMIDE 10 MG/ML
40 INJECTION INTRAMUSCULAR; INTRAVENOUS EVERY 12 HOURS
Status: DISCONTINUED | OUTPATIENT
Start: 2021-07-27 | End: 2021-07-27

## 2021-07-27 RX ORDER — NOREPINEPHRINE BITARTRATE/D5W 8 MG/250ML
.5-3 PLASTIC BAG, INJECTION (ML) INTRAVENOUS
Status: DISCONTINUED | OUTPATIENT
Start: 2021-07-27 | End: 2021-08-02 | Stop reason: HOSPADM

## 2021-07-27 RX ORDER — HEPARIN SODIUM 1000 [USP'U]/ML
5000 INJECTION, SOLUTION INTRAVENOUS; SUBCUTANEOUS EVERY 12 HOURS
Status: DISCONTINUED | OUTPATIENT
Start: 2021-07-28 | End: 2021-07-29

## 2021-07-27 RX ORDER — PROMETHAZINE HYDROCHLORIDE 25 MG/1
25 TABLET ORAL
Status: DISCONTINUED | OUTPATIENT
Start: 2021-07-27 | End: 2021-08-02 | Stop reason: HOSPADM

## 2021-07-27 RX ORDER — FENTANYL CITRATE 50 UG/ML
25-100 INJECTION, SOLUTION INTRAMUSCULAR; INTRAVENOUS
Status: DISCONTINUED | OUTPATIENT
Start: 2021-07-27 | End: 2021-07-27

## 2021-07-27 RX ORDER — POTASSIUM CHLORIDE 20 MEQ/1
40 TABLET, EXTENDED RELEASE ORAL
Status: ACTIVE | OUTPATIENT
Start: 2021-07-27 | End: 2021-07-27

## 2021-07-27 RX ADMIN — POTASSIUM CHLORIDE 10 MEQ: 7.46 INJECTION, SOLUTION INTRAVENOUS at 05:23

## 2021-07-27 RX ADMIN — POTASSIUM CHLORIDE 10 MEQ: 7.46 INJECTION, SOLUTION INTRAVENOUS at 08:00

## 2021-07-27 RX ADMIN — ONDANSETRON 4 MG: 2 INJECTION INTRAMUSCULAR; INTRAVENOUS at 14:48

## 2021-07-27 RX ADMIN — POTASSIUM CHLORIDE 10 MEQ: 7.46 INJECTION, SOLUTION INTRAVENOUS at 02:35

## 2021-07-27 RX ADMIN — AMIODARONE HYDROCHLORIDE 1 MG/MIN: 50 INJECTION, SOLUTION INTRAVENOUS at 02:38

## 2021-07-27 RX ADMIN — MEROPENEM 1 G: 1 INJECTION INTRAVENOUS at 14:34

## 2021-07-27 RX ADMIN — DOBUTAMINE IN DEXTROSE 10 MCG/KG/MIN: 200 INJECTION, SOLUTION INTRAVENOUS at 14:34

## 2021-07-27 RX ADMIN — DOXYCYCLINE 100 MG: 100 INJECTION, POWDER, LYOPHILIZED, FOR SOLUTION INTRAVENOUS at 05:25

## 2021-07-27 RX ADMIN — POTASSIUM CHLORIDE 10 MEQ: 7.46 INJECTION, SOLUTION INTRAVENOUS at 07:00

## 2021-07-27 RX ADMIN — Medication 1 TABLET: at 20:58

## 2021-07-27 RX ADMIN — Medication 10 MCG/MIN: at 20:58

## 2021-07-27 RX ADMIN — MORPHINE SULFATE 2 MG: 2 INJECTION, SOLUTION INTRAMUSCULAR; INTRAVENOUS at 00:11

## 2021-07-27 RX ADMIN — DOBUTAMINE IN DEXTROSE 10 MCG/KG/MIN: 200 INJECTION, SOLUTION INTRAVENOUS at 22:30

## 2021-07-27 RX ADMIN — POTASSIUM CHLORIDE 10 MEQ: 7.46 INJECTION, SOLUTION INTRAVENOUS at 03:28

## 2021-07-27 RX ADMIN — FENTANYL CITRATE 25 MCG: 50 INJECTION, SOLUTION INTRAMUSCULAR; INTRAVENOUS at 16:18

## 2021-07-27 RX ADMIN — DOBUTAMINE IN DEXTROSE 20 MCG/KG/MIN: 200 INJECTION, SOLUTION INTRAVENOUS at 03:40

## 2021-07-27 RX ADMIN — MAGNESIUM SULFATE HEPTAHYDRATE 3 G: 500 INJECTION, SOLUTION INTRAMUSCULAR; INTRAVENOUS at 08:40

## 2021-07-27 RX ADMIN — MORPHINE SULFATE 2 MG: 2 INJECTION, SOLUTION INTRAMUSCULAR; INTRAVENOUS at 14:34

## 2021-07-27 RX ADMIN — AMIODARONE HYDROCHLORIDE 0.5 MG/MIN: 50 INJECTION, SOLUTION INTRAVENOUS at 23:36

## 2021-07-27 RX ADMIN — PROMETHAZINE HYDROCHLORIDE 25 MG: 25 TABLET ORAL at 09:34

## 2021-07-27 RX ADMIN — FAMOTIDINE 20 MG: 20 TABLET ORAL at 20:58

## 2021-07-27 RX ADMIN — FUROSEMIDE 40 MG: 10 INJECTION, SOLUTION INTRAMUSCULAR; INTRAVENOUS at 07:59

## 2021-07-27 RX ADMIN — DOBUTAMINE IN DEXTROSE 10 MCG/KG/MIN: 200 INJECTION, SOLUTION INTRAVENOUS at 08:40

## 2021-07-27 RX ADMIN — POTASSIUM CHLORIDE 10 MEQ: 7.46 INJECTION, SOLUTION INTRAVENOUS at 00:04

## 2021-07-27 RX ADMIN — ENOXAPARIN SODIUM 40 MG: 40 INJECTION SUBCUTANEOUS at 08:00

## 2021-07-27 RX ADMIN — IOPAMIDOL 9 ML: 755 INJECTION, SOLUTION INTRAVENOUS at 17:00

## 2021-07-27 RX ADMIN — POTASSIUM CHLORIDE 10 MEQ: 7.46 INJECTION, SOLUTION INTRAVENOUS at 06:37

## 2021-07-27 RX ADMIN — MORPHINE SULFATE 2 MG: 2 INJECTION, SOLUTION INTRAMUSCULAR; INTRAVENOUS at 06:30

## 2021-07-27 RX ADMIN — POTASSIUM CHLORIDE 10 MEQ: 7.46 INJECTION, SOLUTION INTRAVENOUS at 01:15

## 2021-07-27 RX ADMIN — ONDANSETRON 4 MG: 2 INJECTION INTRAMUSCULAR; INTRAVENOUS at 08:40

## 2021-07-27 RX ADMIN — Medication 10 ML: at 21:08

## 2021-07-27 NOTE — PROGRESS NOTES
1930- pt arrived from 4w- room 65- admitted to - family at [address] at bedside-             2150- called placed to hospitalist- Dr. Phyllis Barnes- awaiting call back    2230- pt transported to CT with 2 RN's and transport - daughter at bedside    2250- back to room

## 2021-07-27 NOTE — PROCEDURES
Interventional Radiology Brief Procedure Note    Patient: Denise Tiwari MRN: [id number]  SSN: [id number]    YOB: 1933  Age: 80 y.o. Sex: female      Date of Procedure: 7/27/2021    Pre-Procedure Diagnosis: sepsis, cholecystitis    Post-Procedure Diagnosis: SAME    Procedure(s): Percutaneous cholecystostomy placement    Brief Description of Procedure: US and fluoro guided cholecystostomy drain placed. Performed By: Marisol Asher DO     Assistants: None    Anesthesia: Lidocaine    Estimated Blood Loss: Less than 10ml    Specimens: 20mL bilious fluid    Implants:  All Purpose Drain    Findings: Markedly distended gallbladder, wall thickening    Complications: None

## 2021-07-27 NOTE — PROCEDURES
Procedure: Triple-lumen catheter insertion    Indication: Poor venous access and requirement for vasopressor    Medications: Lidocaine 1%    Consent for procedure obtained by patient and daughter    Procedure note:    Using sterile technique and 1% lidocaine for local anesthesia the left internal jugular vein was percutaneously punctured under ultrasound guidance. Utilizing Seldinger technique, a guidewire was placed. The subcutaneous tunnel was percutaneously and progressively dilated. A triple-lumen catheter was placed over the guidewire and inserted into the SVC right atrial junction. Good blood flow was aspirated without difficulty. Catheter was secured in place. Complications none. Chest x-ray revealed no acute complications post procedure. Plan:    Catheter is ready for immediate use.

## 2021-07-27 NOTE — PROGRESS NOTES
Reason for Admission:    80 y.o. female with past medical history of HTN, CAD, afib presenting to the ED with chest pain. Chest pain started last night around 2100 located substernally and in her RUQ and LUQ. She reports it was a sharp pain. She was still having chest pain this morning when she woke up and was given ASA 325mg, 1 tab of Nitroglycerin, and 50mcg of Fentanyl and received relief. Daughter at the bedside reports that 11 year ago patient had a cardiac cath and found to have a lesion in an arch and instead of stenting, opted to start on anticoagulation. Patient reports that she has had vaginal bleeding since starting Plavix, but has had a complete hysterectomy. Patient reports she has trouble ambulating due to the shortness of breath, for example getting her mail. Significant findings include elevated WBC, subtherapeutic INR, elevated BNP, and CXR with central pulmonary vascular prominence likely accounting for the fullness in the  beatrice. There are also likely coarsened interstitial opacities that could  represent early interstitial edema or other interstitial process                    RUR Score:     21%             PCP: First and Last name:   Haley Cervantes NP     Name of Practice:    Are you a current patient: Yes/No: Yes   Approximate date of last visit:  Couple of weeks   Can you participate in a virtual visit if needed: no office visits    Do you (patient/family) have any concerns for transition/discharge? Plan for utilizing home health:   Home with Located within [facility] vs IRF    Current Advanced Directive/Advance Care Plan:  DNR      Healthcare Decision Maker:   Click here to complete [address] including selection of the Healthcare Decision Maker Relationship (ie \"Primary\")              Transition of Care Plan:  CM opened case for assessment of D/C planning needs, CM reviewed chart.       Per PT note she lives alone in a single story home with 2 steps to enter from garage  ,no  HR , holds on to the door when entering ,   IND with ADL's and mod I for functional transfers/mobility with no AD prior to admission . Per daughter , pt does furniture walking and is hesitant to use any AD . CM met with patient, patient daughter Rafaela Nieves and son Margi Russell at bedside daughter confirm above. Discuss with daughter [address] [facility] for IRF and [facility] agencies at this time no preferences CM to provide list for IRF and [facility] for family choice. Per daughter no question or concerns waiting on PT evaluation. Discuss 1st  [facility] sign copy give to family.     [address]  [phone]

## 2021-07-27 NOTE — PROGRESS NOTES
Pt. Was transferred to ICU room 266 due to altered mentation, hypotension and tachycardia. PA Reasoner ordered the transfer. Bedside report was given  In ICU.

## 2021-07-27 NOTE — PROGRESS NOTES
Spoke with MD Jefe Flores regarding potassium runs - he wishes to continue with total of 8 x 10 mEq. Oziel Samson Spoke with MD Jefe Flores regarding Morphine freq (current CrCl = 21.3). He changed to q6h prn.

## 2021-07-27 NOTE — CONSULTS
Consult Date: 7/27/2021    Consults   Sepsis    Subjective   This is an 80year old female with diabetes, CAD, who presented to ED with chest pain. She had low grade temperature with WBC 15,000. Urinalysis showed marked pyuria and bacteria. Initial CXR showed interstitial changes but no focal consolidation. Images reviewed by me. CT Abdomen showed findings suggestive of cholecystitis, in addition to nonbstructing right nephrolithiasis. Images reviewed by me. US showed gallbladder sludge without stones. TTE was unremarkable. Blood and urine cultures were sent and patient was started on IV Cefepime and Doxycycline. Also started on vasopressor support with Levophed and dobutamine. ID has been consulted for this reason. Patient admitted to the ICU. Repeat CXR showed clear lungs. General Surgery also consulted with concern for acute cholecystitis with plans for percutaneous cholecystotomy. Patient is somnolent at this time and unable to provide any history. Daughter at bedside affirms history of chest pain. Also inidicated that her mother was recently treated for UTI by PCP, however, contacted PCP (ROSALIE Henning) and last treatment was for E. Coli in September 2020.   Past Medical History:   Diagnosis Date    Atrial fibrillation ([address]     CAD (coronary artery disease)     Diabetes mellitus ([address]     resolved    Endometrial cancer ([facility] [address]     s/p JABARI, RT in 1990s    Hypertension     Skin cancer       Past Surgical History:   Procedure Laterality Date    HX HYSTERECTOMY       Family History   Problem Relation Age of Onset    Hypertension Father     Cancer Father     Hypertension Brother     Ovarian Cancer Other         daughter, unsure if genetic testing performed      Social History     Tobacco Use    Smoking status: Former Smoker    Smokeless tobacco: Never Used    Tobacco comment: quit >50 yrs ago   Substance Use Topics    Alcohol use: Not on file       Current Facility-Administered Medications   Medication Dose Route Frequency Provider Last Rate Last Admin    NOREPINephrine (LEVOPHED) 8 mg in 5% dextrose 250mL (32 mcg/mL) infusion  0.5-30 mcg/min IntraVENous TITRATE Schuyler Bower MD 18.8 mL/hr at 07/27/21 0351 10 mcg/min at 07/27/21 0351    magnesium sulfate 3 g in 0.9% sodium chloride 100 mL IVPB  3 g IntraVENous ONCE Anup Bower MD        potassium chloride (K-DUR, KLOR-CON) SR tablet 40 mEq  40 mEq Oral NOW Anup Bower MD        furosemide (LASIX) injection 40 mg  40 mg IntraVENous Q12H Schuyler Bower MD   40 mg at 07/27/21 0759    0.9% sodium chloride infusion  75 mL/hr IntraVENous CONTINUOUS KAILA GonzalezC 75 mL/hr at 07/26/21 1400 75 mL/hr at 07/26/21 1400    doxycycline (VIBRAMYCIN) 100 mg in 0.9% sodium chloride (MBP/ADV) 100 mL MBP  100 mg IntraVENous Q12H KAILA GonzalezC 100 mL/hr at 07/27/21 0525 100 mg at 07/27/21 0525    cefepime (MAXIPIME) 2 g in sterile water (preservative free) 10 mL IV syringe  2 g IntraVENous Q12H Elvia Cee PA-C 200 mL/hr at 07/26/21 2123 2 g at 07/26/21 2123    amiodarone (CORDARONE) 375 mg in dextrose 5% 250 mL infusion  1 mg/min IntraVENous CONTINUOUS Sylvie KAUR MD 40 mL/hr at 07/27/21 0238 1 mg/min at 07/27/21 0238    DOBUTamine (DOBUTREX) 500 mg/250 mL (2,000 mcg/mL) infusion  0-10 mcg/kg/min IntraVENous TITRATE Marycruz Quiñones MD 51.2 mL/hr at 07/27/21 0340 20 mcg/kg/min at 07/27/21 0340    potassium chloride 10 mEq in 100 ml IVPB  10 mEq IntraVENous ONCE Anup Bower  mL/hr at 07/27/21 0800 10 mEq at 07/27/21 0800    morphine injection 2 mg  2 mg IntraVENous Q6H PRN Marycruz Quiñones MD   2 mg at 07/27/21 0630    sodium chloride (NS) flush 5-40 mL  5-40 mL IntraVENous Q8H Joyce Pretty, NP   10 mL at 07/26/21 2124    sodium chloride (NS) flush 5-40 mL  5-40 mL IntraVENous PRN Joyce Pretty, NP  acetaminophen (TYLENOL) tablet 650 mg  650 mg Oral Q6H PRN Morgan Medranot, NP        Or   [employer] acetaminophen (TYLENOL) suppository 650 mg  650 mg Rectal Q6H PRN Morgan Medranot, NP        polyethylene glycol (MIRALAX) packet 17 g  17 g Oral DAILY PRN Morgan Medranot, NP        bisacodyL (DULCOLAX) tablet 5 mg  5 mg Oral DAILY PRN Morgan Medrano, NP        ondansetron (ZOFRAN ODT) tablet 4 mg  4 mg Oral Q6H PRN Morgan [facility], NP        Or    ondansetron [facility]) injection 4 mg  4 mg IntraVENous Q6H PRN Morgan Medrano, NP   4 mg at 07/26/21 1235    famotidine (PEPCID) tablet 20 mg  20 mg Oral BID Morgan Medrano, NP   20 mg at 07/26/21 1662    enoxaparin (LOVENOX) injection 40 mg  40 mg SubCUTAneous DAILY Morgan Medrano, NP   40 mg at 07/27/21 0800    atorvastatin (LIPITOR) tablet 20 mg  20 mg Oral DAILY Morgan [facility], NP   20 mg at 07/26/21 2734    calcium-vitamin D 600 mg(1,500mg) -200 unit per tablet 1 Tablet  1 Tablet Oral BID Morgan Medrano, NP   1 Tablet at 07/26/21 6507    cholecalciferol (VITAMIN D3) (1000 Units /25 mcg) tablet 2,000 Units  2,000 Units Oral DAILY Morgan [facility], NP   2,000 Units at 07/26/21 8193    [Held by provider] isosorbide mononitrate ER (IMDUR) tablet 30 mg  30 mg Oral DAILY Morgan Medranot, NP   30 mg at 07/26/21 5599    levothyroxine (SYNTHROID) tablet 75 mcg  75 mcg Oral ACB Morgan [facility], NP   75 mcg at 07/26/21 0811    [Held by provider] lisinopriL (PRINIVIL, ZESTRIL) tablet 20 mg  20 mg Oral DAILY [facility], NP   20 mg at 07/26/21 1192    [Held by provider] metoprolol succinate (TOPROL-XL) XL tablet 50 mg  50 mg Oral DAILY [facility], NP   50 mg at 07/26/21 0809    [Held by provider] minoxidiL (LONITEN) tablet 5 mg  5 mg Oral DAILY Morgan Kast, NP   5 mg at 07/26/21 6132        Review of Systems   Unable to perform ROS: Acuity of condition   Constitutional: Negative for chills and fever. HENT: Negative. Eyes: Negative. Respiratory: Negative. Cardiovascular: Positive for chest pain. Negative for palpitations. Gastrointestinal: Negative. Endocrine: Negative. Genitourinary: Negative. Musculoskeletal: Negative. Skin: Negative. Allergic/Immunologic: Negative. Neurological: Negative. Hematological: Negative. Psychiatric/Behavioral: Negative. Objective     Vital signs for last 24 hours:  Visit Vitals  BP (!) 101/52 (BP 1 Location: Right upper arm, BP Patient Position: At rest)   Pulse (!) 117   Temp 99.3 °F (37.4 °C)   Resp 19   Ht 5' 4\" (1.626 m)   Wt 188 lb (85.3 kg)   SpO2 96%   Breastfeeding No   BMI 32.27 kg/m²       Intake/Output this shift:  Current Shift: No intake/output data recorded.   Last 3 Shifts: 07/25 1901 - 07/27 0700  In: 3279.2 [I.V.:3279.2]  Out: 1440 [Urine:1440]    Data Review:   Recent Results (from the past 24 hour(s))   ECHO ADULT COMPLETE    Collection Time: 07/26/21 10:30 AM   Result Value Ref Range    Aortic Valve Systolic Peak Velocity 466.03 cm/s    AoV PG 10.00 mmHg    Ao Root D 3.30 cm    IVSd 1.48 (A) 0.60 - 0.90 cm    LVIDd 4.45 3.90 - 5.30 cm    LVIDs 2.87 cm    LVOT Peak Velocity 116.00 cm/s    LVOT Peak Gradient 5.00 mmHg    LVPWd 1.44 (A) 0.60 - 0.90 cm    LV E' Septal Velocity 10.20 cm/s    LV ED Vol A2C 88.10 cm3    LV ES Vol A2C 23.60 cm3    Left Atrium Major Axis 4.80 cm    Mitral Valve Deceleration [place] 5,320.00 mm/s2    Mitral Valve Deceleration [place] 5,320.00 mm/s2    Mitral Valve Pressure Half-time 84.00 ms    MV Mean Gradient 2.00 mmHg    Mitral Valve Annulus Velocity Time Integral 39.30 cm    Mitral Valve Max Velocity 159.00 cm/s    MV Peak Gradient 10.00 mmHg    MVA (PHT) 2.62 cm2    Pulmonic Valve Max Velocity 122.00 cm/s    Pulmonic Valve Systolic Peak Instantaneous Gradient 6.00 mmHg    Est. RA Pressure 3.00 mmHg    RVIDd 2.82 cm    RVSP 39.00 mmHg    Tricuspid Valve Max Velocity 301.00 cm/s    Triscuspid Valve Regurgitation Peak Gradient 36.00 mmHg Right Atrial Area 4C 21.31 cm2    LA Area 4C 20.92 cm2    BP EF 65.1 55.0 - 100.0 %    LV Mass .3 67.0 - 162.0 g    LV Mass AL Index 136.3 43.0 - 95.0 g/m2    Left Atrium Minor Axis 2.51 cm   PROCALCITONIN    Collection Time: 07/26/21  4:33 PM   Result Value Ref Range    Procalcitonin 1.11 (H) 0 ng/mL   URINALYSIS W/ REFLEX CULTURE    Collection Time: 07/26/21  8:15 PM    Specimen: Urine   Result Value Ref Range    Color Yellow      Appearance Turbid (A) Clear      Specific gravity 1.010 1.003 - 1.030      pH (UA) 5.0 5.0 - 8.0      Protein 30 (A) Negative mg/dL    Glucose Negative Negative mg/dL    Ketone Negative Negative mg/dL    Bilirubin Negative Negative      Blood Moderate (A) Negative      Urobilinogen 0.1 0.1 - 1.0 EU/dL    Nitrites Negative Negative      Leukocyte Esterase Large (A) Negative      WBC >100 (H) 0 - 4 /hpf    RBC 20-50 0 - 5 /hpf    Bacteria 1+ (A) Negative /hpf    UA:UC IF INDICATED Urine Culture Ordered (A) Culture not indicated by UA result      Mucus Trace (A) Negative /lpf   CBC WITH AUTOMATED DIFF    Collection Time: 07/26/21  8:15 PM   Result Value Ref Range    WBC 21.2 (H) 3.6 - 11.0 K/uL    RBC 3.44 (L) 3.80 - 5.20 M/uL    HGB 10.2 (L) 11.5 - 16.0 g/dL    HCT 30.5 (L) 35.0 - 47.0 %    MCV 88.7 80.0 - 99.0 FL    MCH 29.7 26.0 - 34.0 PG    MCHC 33.4 30.0 - 36.5 g/dL    RDW 15.8 (H) 11.5 - 14.5 %    PLATELET 215 416 - 519 K/uL    MPV 11.0 8.9 - 12.9 FL    NRBC 0.0 0.0  WBC    ABSOLUTE NRBC 0.00 0.00 - 0.01 K/uL    NEUTROPHILS 85 (H) 32 - 75 %    LYMPHOCYTES 9 (L) 12 - 49 %    MONOCYTES 6 5 - 13 %    EOSINOPHILS 0 0 - 7 %    BASOPHILS 0 0 - 1 %    IMMATURE GRANULOCYTES 0 %    ABS. NEUTROPHILS 18.0 (H) 1.8 - 8.0 K/UL    ABS. LYMPHOCYTES 1.9 0.8 - 3.5 K/UL    ABS. MONOCYTES 1.3 (H) 0.0 - 1.0 K/UL    ABS. EOSINOPHILS 0.0 0.0 - 0.4 K/UL    ABS. BASOPHILS 0.0 0.0 - 0.1 K/UL    ABS. IMM.  GRANS. 0.0 K/UL    DF Manual      RBC COMMENTS Anisocytosis  1+       TYPE & SCREEN Collection Time: 07/26/21  8:15 PM   Result Value Ref Range    Crossmatch Expiration 07/29/2021,2359     ABO/Rh(D) O Positive     Antibody screen Negative    METABOLIC PANEL, COMPREHENSIVE    Collection Time: 07/26/21  8:15 PM   Result Value Ref Range    Sodium 135 (L) 136 - 145 mmol/L    Potassium 3.1 (L) 3.5 - 5.1 mmol/L    Chloride 99 97 - 108 mmol/L    CO2 28 21 - 32 mmol/L    Anion gap 8 5 - 15 mmol/L    Glucose 128 (H) 65 - 100 mg/dL    BUN 36 (H) 6 - 20 mg/dL    Creatinine 1.93 (H) 0.55 - 1.02 mg/dL    BUN/Creatinine ratio 19 12 - 20      GFR est AA 30 (L) >60 ml/min/1.73m2    GFR est non-AA 25 (L) >60 ml/min/1.73m2    Calcium 8.0 (L) 8.5 - 10.1 mg/dL    Bilirubin, total 2.6 (H) 0.2 - 1.0 mg/dL    AST (SGOT) 38 (H) 15 - 37 U/L    ALT (SGPT) 35 12 - 78 U/L    Alk.  phosphatase 50 45 - 117 U/L    Protein, total 6.0 (L) 6.4 - 8.2 g/dL    Albumin 2.6 (L) 3.5 - 5.0 g/dL    Globulin 3.4 2.0 - 4.0 g/dL    A-G Ratio 0.8 (L) 1.1 - 2.2     BNP    Collection Time: 07/26/21  8:15 PM   Result Value Ref Range    NT pro-BNP 13,824 (H) <450 pg/mL   LACTIC ACID    Collection Time: 07/26/21  8:15 PM   Result Value Ref Range    Lactic acid 1.2 0.4 - 2.0 mmol/L   MRSA SCREEN - PCR (NASAL)    Collection Time: 07/26/21  8:15 PM   Result Value Ref Range    MRSA by PCR, Nasal Not Detected Not Detected     CREATININE, UR, RANDOM    Collection Time: 07/26/21 10:15 PM   Result Value Ref Range    Creatinine, urine 80.00 mg/dL   POTASSIUM, UR, RANDOM    Collection Time: 07/26/21 10:15 PM   Result Value Ref Range    Potassium urine, random 41 mmol/L   SODIUM, UR, RANDOM    Collection Time: 07/26/21 10:15 PM   Result Value Ref Range    Sodium,urine random 68 mmol/L   CHLORIDE, URINE RANDOM    Collection Time: 07/26/21 10:15 PM   Result Value Ref Range    Chloride,urine random 89 mmol/L   PROTEIN URINE, RANDOM    Collection Time: 07/26/21 10:15 PM   Result Value Ref Range    Protein, urine random 52 (H) 0.0 - 11.9 mg/dL   CBC WITH AUTOMATED DIFF    Collection Time: 07/27/21  5:00 AM   Result Value Ref Range    WBC 17.2 (H) 3.6 - 11.0 K/uL    RBC 3.15 (L) 3.80 - 5.20 M/uL    HGB 9.3 (L) 11.5 - 16.0 g/dL    HCT 27.9 (L) 35.0 - 47.0 %    MCV 88.6 80.0 - 99.0 FL    MCH 29.5 26.0 - 34.0 PG    MCHC 33.3 30.0 - 36.5 g/dL    RDW 15.9 (H) 11.5 - 14.5 %    PLATELET 435 (L) 033 - 400 K/uL    MPV 10.5 8.9 - 12.9 FL    NRBC 0.0 0.0  WBC    ABSOLUTE NRBC 0.00 0.00 - 0.01 K/uL    NEUTROPHILS 92 (H) 32 - 75 %    LYMPHOCYTES 2 (L) 12 - 49 %    MONOCYTES 4 (L) 5 - 13 %    EOSINOPHILS 0 0 - 7 %    BASOPHILS 0 0 - 1 %    IMMATURE GRANULOCYTES 2 (H) 0 - 0.5 %    ABS. NEUTROPHILS 15.8 (H) 1.8 - 8.0 K/UL    ABS. LYMPHOCYTES 0.4 (L) 0.8 - 3.5 K/UL    ABS. MONOCYTES 0.7 0.0 - 1.0 K/UL    ABS. EOSINOPHILS 0.0 0.0 - 0.4 K/UL    ABS. BASOPHILS 0.0 0.0 - 0.1 K/UL    ABS. IMM. GRANS. 0.3 (H) 0.00 - 0.04 K/UL    DF AUTOMATED     METABOLIC PANEL, COMPREHENSIVE    Collection Time: 07/27/21  5:00 AM   Result Value Ref Range    Sodium 133 (L) 136 - 145 mmol/L    Potassium 3.5 3.5 - 5.1 mmol/L    Chloride 101 97 - 108 mmol/L    CO2 26 21 - 32 mmol/L    Anion gap 6 5 - 15 mmol/L    Glucose 195 (H) 65 - 100 mg/dL    BUN 41 (H) 6 - 20 mg/dL    Creatinine 1.81 (H) 0.55 - 1.02 mg/dL    BUN/Creatinine ratio 23 (H) 12 - 20      GFR est AA 32 (L) >60 ml/min/1.73m2    GFR est non-AA 26 (L) >60 ml/min/1.73m2    Calcium 7.4 (L) 8.5 - 10.1 mg/dL    Bilirubin, total 1.8 (H) 0.2 - 1.0 mg/dL    AST (SGOT) 31 15 - 37 U/L    ALT (SGPT) 31 12 - 78 U/L    Alk.  phosphatase 50 45 - 117 U/L    Protein, total 5.5 (L) 6.4 - 8.2 g/dL    Albumin 2.3 (L) 3.5 - 5.0 g/dL    Globulin 3.2 2.0 - 4.0 g/dL    A-G Ratio 0.7 (L) 1.1 - 2.2     PROCALCITONIN    Collection Time: 07/27/21  5:00 AM   Result Value Ref Range    Procalcitonin 1.16 (H) 0 ng/mL   VANCOMYCIN, RANDOM    Collection Time: 07/27/21  5:00 AM   Result Value Ref Range    Vancomycin, random 14.7 ug/mL   C REACTIVE PROTEIN, QT    Collection Time: 07/27/21  5:00 AM   Result Value Ref Range    C-Reactive protein 29.60 (H) 0.00 - 0.60 mg/dL   MAGNESIUM    Collection Time: 07/27/21  5:00 AM   Result Value Ref Range    Magnesium 1.3 (L) 1.6 - 2.4 mg/dL     CXR (7/27)          Physical Exam  Vitals (Levophed) and nursing note reviewed. Constitutional:       Appearance: She is ill-appearing. HENT:      Head: Normocephalic and atraumatic. Right Ear: External ear normal.      Left Ear: External ear normal.      Nose:      Comments: Nasal O2 3L/min (off at the time of my exam)     Mouth/Throat:      Pharynx: Oropharynx is clear. Eyes:      Pupils: Pupils are equal, round, and reactive to light. Cardiovascular:      Rate and Rhythm: Regular rhythm. Tachycardia present. Heart sounds: No murmur heard. Pulmonary:      Breath sounds: Rhonchi and rales present. No wheezing. Abdominal:      General: Bowel sounds are normal.      Palpations: Abdomen is soft. Tenderness: There is abdominal tenderness. There is right CVA tenderness. Genitourinary:     Comments: [place] catheter    Musculoskeletal:      Cervical back: Neck supple. Right lower leg: No edema. Left lower leg: No edema. Skin:     Findings: No rash. Neurological:      Comments: Somnolent   Psychiatric:      Comments: Somnolent         ASSESSMENT/PLAN    1. Sepsis with septic shock, manifested by fever, hypotension requiring vasopressors, leukocytosis, elevated procal and CRP  2. Possible Complicated UTI (right nephrolithiasis) with pyuria and bacteriuria, urine culture pending  3. ?Acute cholecystitis (LFTs normal), followed by General Surgery  4. Chronic kidney disease  5. Penicillin allergy    Comment:  Urosepsis appears likely in this patient. Concern for cholecystitis also noted. Would favor switching to Meropenem (given penicillin allergy) which should be adequate coverage for urine and abdomen.   Reasonable to hold off on Vancomycin, especially given underlying kidney disease. 1. Discontinue Cefepime  2. Start IV Meropenem  3. Discontinue Doxycycline  4. Follow-up blood and urine cultures    Wenceslao Aguilar MD

## 2021-07-27 NOTE — PROGRESS NOTES
CARDIOLOGY PROGRESS NOTE     Patient seen and examined. This is a patient who is followed for chest pain. Patient has a history of hypertension, coronary artery disease, and atrial fibrillation. Patient's condition deteriorated overnight and was transferred to ICU. She remains on pressor support, dobutamine, and amiodarone. Patient is lying in bed with eyes closed. She looks ill. Family at the bedside. No other complaints reported. Telemetry reviewed, there were no events noted in the past 24 hours. Sinus tachycardia; heart rate; 133. Pertinent review of systems items noted above, all other systems are negative. Current medications reviewed. Physical Examination  Vital signs are stable. Blood pressure 103/51 , Pulse 130  No apparent distress. Heart is regular, rate and rhythm. Normal S1, S2, no murmurs are appreciated. Lungs are clear bilaterally. Abdomen is soft, nontender, normal bowel sounds. Extremities have no edema. Labs reviewed. 7/27/21   Mg 1.3    7/27/21 - 2- D echo:   · LV: Calculated LVEF is 65%. Normal cavity size, wall thickness, systolic function (ejection fraction normal) and diastolic function. Wall motion: normal. Normal left ventricular strain. · LA: Dilated left atrium. · TV: Mild to moderate tricuspid valve regurgitation is present. Diastolic function if indeterminate given presence of atrial fibrillation. Given age and dilated left atrium, likely has some degree of diastolic dysfunction. Case discussed with Dr. Jeremy Senior and our impression and recommendations are as follows:    1. Chest pain: No active chest pain, complaining of fatigue and lower abdominal pain, CT abdomen positive for cholecystistis. Troponins are negative x 4. Serial EKGs are nonischemic. ACS ruled out. Echocardiogram with preserved EF and no wall motion abnormalities. Continue telemetry monitoring. Isosorbide on hold due to low BP. Continue dobutamine.      2. Atrial fibrillation: Rate is 130s. Started on amio gtt. Her CHADS2-VASc score is 4; continue coumadin. Continue telemetry monitoring. Keep serum potassium between 4-5 and serum magnesium > 2. Will replete magnesium.         3. Hypertension: continue levophed and titrate as tolerated. 4. Acute cholecystis: surgery on the case. Patient to go for drain placement per IR. No surgical intervention at this time. Please do not hesitate to call me or Dr. Gadiel Nuñez if additional questions arise. Agree with above. Seen with Nikunj Hernández NP  - As stated previously, her presentation was atypical for cardiac cause of her pain  - Given tachycardia and negative trops x4, with normal TTE, no further risk stratification needed prior to procedure/surgery  - Patient has developed SHAKIRA in the setting of over-diuresis  - Tachycardia is compensatory giver her sepsis, cholecystitis, on multiple pressors including Norepinenephrine and Dobutamine. Hold all beta blockers  - OK to continue Amiodarone for now  - Goal is source of infection control, surgery vs drain placement, and broad spectrum antibiotics. Unclear if atypical PNA component.      Bruno Cortes MD, RASHI Irizarry  Structural Heart Disease  Endovascular and Vascular Medicine  Interventional Cardiology  [facility]  [phone]

## 2021-07-27 NOTE — CONSULTS
Nephrology Consult    Patient: Chirag Rodriguez MRN: [id number]  SSN: [id number]    YOB: 1933  Age: 80 y.o. Sex: female      Subjective:   Reason for the consultation. Acute renal failure  History of present illness. The patient is 60-year-old woman with underlying history of coronary artery disease, A. fib, diabetes mellitus type 2, coronary artery disease, hypertension was admitted with right upper quadrant pain, WBC 21k, severely hypotensive, on pressor, CT abdomen showed distended gallbladder and pericholecystic fluid, diagnosed with acute cholecystitis, general surgery planning to do cholecystotomy, A. fib with RVR/chest pain and seen by cardiologist.  On admission creatinine was 0.9 and has bumped to 1.8 which prompted a nephrology consultation. She has [place] catheter in place and urine output is appropriate. No lower extremity swelling. Chest x-ray showed no evidence of edema and has preserved LVEF. Her lisinopril and nitrates were appropriately held.     Past Medical History:   Diagnosis Date    Atrial fibrillation ([facility] [address]     CAD (coronary artery disease)     Diabetes mellitus ([facility] [address]     resolved    Endometrial cancer ([facility] [address]     s/p TAHBSO, RT in 1990s    Hypertension     Skin cancer      Past Surgical History:   Procedure Laterality Date    HX HYSTERECTOMY        Family History   Problem Relation Age of Onset    Hypertension Father     Cancer Father     Hypertension Brother     Ovarian Cancer Other         daughter, unsure if genetic testing performed     Social History     Tobacco Use    Smoking status: Former Smoker    Smokeless tobacco: Never Used    Tobacco comment: quit >50 yrs ago   Substance Use Topics    Alcohol use: Not on file      Current Facility-Administered Medications   Medication Dose Route Frequency Provider Last Rate Last Admin    NOREPINephrine (LEVOPHED) 8 mg in 5% dextrose 250mL (32 mcg/mL) infusion  0.5-30 mcg/min IntraVENous TITRATE Anup Allen Dafne Mendoza MD 18.8 mL/hr at 07/27/21 0351 10 mcg/min at 07/27/21 0351    potassium chloride (K-DUR, KLOR-CON) SR tablet 40 mEq  40 mEq Oral NOW Anup Bower MD       [facility] by provider] furosemide (LASIX) injection 40 mg  40 mg IntraVENous Q12H Anup Bower MD   40 mg at 07/27/21 0759    promethazine (PHENERGAN) tablet 25 mg  25 mg Oral Q4H PRN Jonny Solis MD   25 mg at 07/27/21 0934    0.9% sodium chloride infusion  100 mL/hr IntraVENous CONTINUOUS Heriberto Ramirez MD 75 mL/hr at 07/26/21 1400 75 mL/hr at 07/26/21 1400    doxycycline (VIBRAMYCIN) 100 mg in 0.9% sodium chloride (MBP/ADV) 100 mL MBP  100 mg IntraVENous Q12H Uriel Quintana PA-C 100 mL/hr at 07/27/21 0525 100 mg at 07/27/21 0525    cefepime (MAXIPIME) 2 g in sterile water (preservative free) 10 mL IV syringe  2 g IntraVENous Q12H Uriel Quintana PA-C   IV Completed at 07/27/21 0815    amiodarone (CORDARONE) 375 mg in dextrose 5% 250 mL infusion  1 mg/min IntraVENous CONTINUOUS Anita Estella KAUR MD 40 mL/hr at 07/27/21 0238 1 mg/min at 07/27/21 0238    DOBUTamine (DOBUTREX) 500 mg/250 mL (2,000 mcg/mL) infusion  0-10 mcg/kg/min IntraVENous TITRATE Elias Ramírez MD 25.6 mL/hr at 07/27/21 0840 10 mcg/kg/min at 07/27/21 0840    morphine injection 2 mg  2 mg IntraVENous Q6H PRN Elias Ramírez MD   2 mg at 07/27/21 0630    sodium chloride (NS) flush 5-40 mL  5-40 mL IntraVENous Q8H Esperanza Gómez NP   10 mL at 07/26/21 2124    sodium chloride (NS) flush 5-40 mL  5-40 mL IntraVENous PRN Esperanza Gómez NP        acetaminophen (TYLENOL) tablet 650 mg  650 mg Oral Q6H PRN Esperanza Gómez NP        Or   [facility] acetaminophen (TYLENOL) suppository 650 mg  650 mg Rectal Q6H PRN Esperanza Gómez NP        polyethylene glycol (MIRALAX) packet 17 g  17 g Oral DAILY PRN Esperanza Gómez NP        bisacodyL (DULCOLAX) tablet 5 mg  5 mg Oral DAILY PRN Esperanza Gómez NP       [facility] ondansetron (ZOFRAN ODT) tablet 4 mg  4 mg Oral Q6H PRN Rudolpho Fairly, NP        Or    ondansetron [facility]) injection 4 mg  4 mg IntraVENous Q6H PRN Rudolpho Fairly, NP   4 mg at 07/27/21 0840    famotidine (PEPCID) tablet 20 mg  20 mg Oral BID Rudolpho Fairly, NP   20 mg at 07/26/21 8747    atorvastatin (LIPITOR) tablet 20 mg  20 mg Oral DAILY Rudolpho Fairly, NP   20 mg at 07/26/21 6469    calcium-vitamin D 600 mg(1,500mg) -200 unit per tablet 1 Tablet  1 Tablet Oral BID Rudolpho Fairly, NP   1 Tablet at 07/26/21 9590    cholecalciferol (VITAMIN D3) (1000 Units /25 mcg) tablet 2,000 Units  2,000 Units Oral DAILY Rudolpho Fairly, NP   2,000 Units at 07/26/21 1548    [Held by provider] isosorbide mononitrate ER (IMDUR) tablet 30 mg  30 mg Oral DAILY Rudolpho Fairly, NP   30 mg at 07/26/21 6800    levothyroxine (SYNTHROID) tablet 75 mcg  75 mcg Oral ACB Rudolpho Fairly, NP   75 mcg at 07/26/21 0811    [Held by provider] metoprolol succinate (TOPROL-XL) XL tablet 50 mg  50 mg Oral DAILY Rudolpho Fairly, NP   50 mg at 07/26/21 0809    [Held by provider] minoxidiL (LONITEN) tablet 5 mg  5 mg Oral DAILY Rudolpho Fairly, NP   5 mg at 07/26/21 4102        Allergies   Allergen Reactions    Penicillins Unknown (comments)       Review of Systems:  A comprehensive review of systems was negative except for that written in the History of Present Illness.     Objective:     Vitals:    07/27/21 0900 07/27/21 1000 07/27/21 1100 07/27/21 1200   BP: (!) 103/51 (!) 113/47 (!) 117/53 (!) 115/51   Pulse: (!) 130 (!) 124 (!) 123 (!) 120   Resp: 26 23 21 21   Temp:       SpO2: 96% 97% 97% 98%   Weight:       Height:            Physical Exam:  General: NAD  Eyes: sclera anicteric  Oral Cavity: No thrush or ulcers  Neck: no JVD  Chest: Fair bilateral air entry  Heart: normal sounds  Abdomen: soft and  tender +  :  [place]+  Lower Extremities: no edema  Skin: no rash  Neuro: intact  Psychiatric: non-depressed Assessment:     Hospital Problems  Never Reviewed        Codes Class Noted POA    Unstable angina ([facility] [address] ICD-10-CM: I20.0  ICD-9-CM: 411.1  7/25/2021 Unknown        Chest pain with moderate risk for cardiac etiology ICD-10-CM: R07.9  ICD-9-CM: 786.50  7/25/2021 Unknown              Plan:   1 acute kidney injury:  -2/2 prerenal azotemia from sepsis/hypotension plus on diuretics and lisinopril.  -On admission creatinine was 0.9 and has bumped to 1.8 in the last 24 hours.  -Clinically no evidence of volume overload. Chest x-ray is clear. -2D echocardiogram showed preserved LVEF. -Agree to hold lisinopril diuretics and nitrates. -will continue IV fluids.    -No evidence of hydronephrosis per CT of the abdomen. 2.  Hypokalemia.    -From low p.o. intake/hypomagnesemia. -K was 3.2 and magnesium 1.3.    -Getting replaced with K and IV magnesium sulfate. 3.  Septic shock:    -Admitted with right upper quadrant pain, WBC 21K, hypotensive. -CT abdomen showed distended gallbladder and fluid around diagnosed with acute cholecystitis General surgery planning to do cholecystotomy.    -On IV antibiotics. On single pressor. 4. anemia.    -Hemoglobin 9.3.   - I will send iron studies and ferritin level    5. A. fib. With RVR. on IV amiodarone drip. 6. DVT prophylaxis:  -will change lovenox to unfractionated sq hep    Thank you for the consultation.     Signed By: Ami Cummings MD     July 27, 2021

## 2021-07-27 NOTE — PROGRESS NOTES
Hospitalist Progress Note    Subjective:   Daily Progress Note: 7/27/2021 8:49 AM    Hospital Course:  Vonnie Casey is a 80 y.o. female with a  past medical history of HTN, CAD, and atrial fibrillation, who presented to the ED with sharp, substernal chest pain and associated nausea and found to be in atrial fibrillation with RVR. Esequiel Neighbours Patient additionally reported experiencing vaginal bleeding while on Eliquis and now on warfarin, she has a gynecology appointment scheduled for evaluation. Given ASA, pain medication and nitroglycerin with relief. Patient has leukocytosis of unclear etiology with an increasing white blood cell count since admission. Tenderness in the right upper quadrant as well as left upper quadrant. CT scan of the abdomen pelvis pending. Right upper quadrant ultrasound pending. Prophylactically will treat with Rocephin. Urine culture and blood culture pending. INR subtherapeutic at 1.4, BNP 4475. x4 Troponins negative. EKG showed afib. CXR showed central pulmonary vascular prominence and coarse interstitial opacities. Chest pain resolved. Cardiology consulted.  Echo revealed LVEF 65%, dilated left atrium, overnight patient became increasingly hypotensive, was transferred to the ICU, central line placed, obtained CT abdomen pelvis showing biliary sludge, urinalysis consistent with urinary tract infection    Subjective:   Patient seen and evaluated at bedside, of note patient had nausea overnight, currently still complaining of right upper quadrant pain, dull, moderate in intensity, discussed with RN    Current Facility-Administered Medications   Medication Dose Route Frequency    NOREPINephrine (LEVOPHED) 8 mg in 5% dextrose 250mL (32 mcg/mL) infusion  0.5-30 mcg/min IntraVENous TITRATE    magnesium sulfate 3 g in 0.9% sodium chloride 100 mL IVPB  3 g IntraVENous ONCE    potassium chloride (K-DUR, KLOR-CON) SR tablet 40 mEq  40 mEq Oral NOW    furosemide (LASIX) injection 40 mg  40 mg IntraVENous Q12H    0.9% sodium chloride infusion  75 mL/hr IntraVENous CONTINUOUS    doxycycline (VIBRAMYCIN) 100 mg in 0.9% sodium chloride (MBP/ADV) 100 mL MBP  100 mg IntraVENous Q12H    cefepime (MAXIPIME) 2 g in sterile water (preservative free) 10 mL IV syringe  2 g IntraVENous Q12H    amiodarone (CORDARONE) 375 mg in dextrose 5% 250 mL infusion  1 mg/min IntraVENous CONTINUOUS    VANCOMYCIN INFORMATION NOTE   Other Rx Dosing/Monitoring    DOBUTamine (DOBUTREX) 500 mg/250 mL (2,000 mcg/mL) infusion  0-10 mcg/kg/min IntraVENous TITRATE    potassium chloride 10 mEq in 100 ml IVPB  10 mEq IntraVENous ONCE    potassium chloride 10 mEq in 100 ml IVPB  10 mEq IntraVENous Q1H    morphine injection 2 mg  2 mg IntraVENous Q6H PRN    sodium chloride (NS) flush 5-40 mL  5-40 mL IntraVENous Q8H    sodium chloride (NS) flush 5-40 mL  5-40 mL IntraVENous PRN    acetaminophen (TYLENOL) tablet 650 mg  650 mg Oral Q6H PRN    Or    acetaminophen (TYLENOL) suppository 650 mg  650 mg Rectal Q6H PRN    polyethylene glycol (MIRALAX) packet 17 g  17 g Oral DAILY PRN    bisacodyL (DULCOLAX) tablet 5 mg  5 mg Oral DAILY PRN    ondansetron (ZOFRAN ODT) tablet 4 mg  4 mg Oral Q6H PRN    Or    ondansetron (ZOFRAN) injection 4 mg  4 mg IntraVENous Q6H PRN    famotidine (PEPCID) tablet 20 mg  20 mg Oral BID    enoxaparin (LOVENOX) injection 40 mg  40 mg SubCUTAneous DAILY    atorvastatin (LIPITOR) tablet 20 mg  20 mg Oral DAILY    calcium-vitamin D 600 mg(1,500mg) -200 unit per tablet 1 Tablet  1 Tablet Oral BID    cholecalciferol (VITAMIN D3) (1000 Units /25 mcg) tablet 2,000 Units  2,000 Units Oral DAILY    [Held by provider] isosorbide mononitrate ER (IMDUR) tablet 30 mg  30 mg Oral DAILY    levothyroxine (SYNTHROID) tablet 75 mcg  75 mcg Oral ACB    [Held by provider] lisinopriL (PRINIVIL, ZESTRIL) tablet 20 mg  20 mg Oral DAILY    metoprolol succinate (TOPROL-XL) XL tablet 50 mg  50 mg Oral DAILY    [Held by provider] minoxidiL (LONITEN) tablet 5 mg  5 mg Oral DAILY        Review of Systems  Review of Systems   Constitutional: Negative for chills, fever and weight loss. HENT: Negative. Eyes: Negative. Respiratory: Negative for cough, sputum production, shortness of breath and wheezing. Cardiovascular: Negative for chest pain, palpitations, orthopnea and leg swelling. Gastrointestinal: Positive for abdominal pain (minimal, epigastric and RUQ) and nausea. Negative for constipation, diarrhea and vomiting. Genitourinary: Negative for dysuria, frequency and urgency. Musculoskeletal: Negative. Neurological: Negative. Objective:     Visit Vitals  BP (!) 101/52 (BP 1 Location: Right upper arm, BP Patient Position: At rest)   Pulse (!) 117   Temp 99.3 °F (37.4 °C)   Resp 19   Ht 5' 4\" (1.626 m)   Wt 85.3 kg (188 lb)   SpO2 96%   Breastfeeding No   BMI 32.27 kg/m²    O2 Flow Rate (L/min): 3 l/min O2 Device: Nasal cannula    Temp (24hrs), Av.7 °F (37.1 °C), Min:97.2 °F (36.2 °C), Max:99.5 °F (37.5 °C)      No intake/output data recorded.    1901 -  0700  In: 3279.2 [I.V.:3279.2]  Out: 1440 [Urine:1440]    Recent Results (from the past 24 hour(s))   ECHO ADULT COMPLETE    Collection Time: 21 10:30 AM   Result Value Ref Range    Aortic Valve Systolic Peak Velocity 496.33 cm/s    AoV PG 10.00 mmHg    Ao Root D 3.30 cm    IVSd 1.48 (A) 0.60 - 0.90 cm    LVIDd 4.45 3.90 - 5.30 cm    LVIDs 2.87 cm    LVOT Peak Velocity 116.00 cm/s    LVOT Peak Gradient 5.00 mmHg    LVPWd 1.44 (A) 0.60 - 0.90 cm    LV E' Septal Velocity 10.20 cm/s    LV ED Vol A2C 88.10 cm3    LV ES Vol A2C 23.60 cm3    Left Atrium Major Axis 4.80 cm    Mitral Valve Deceleration [place] 5,320.00 mm/s2    Mitral Valve Deceleration [place] 5,320.00 mm/s2    Mitral Valve Pressure Half-time 84.00 ms    MV Mean Gradient 2.00 mmHg    Mitral Valve Annulus Velocity Time Integral 39.30 cm    Mitral Valve Max Velocity 159.00 cm/s    MV Peak Gradient 10.00 mmHg    MVA (PHT) 2.62 cm2    Pulmonic Valve Max Velocity 122.00 cm/s    Pulmonic Valve Systolic Peak Instantaneous Gradient 6.00 mmHg    Est. RA Pressure 3.00 mmHg    RVIDd 2.82 cm    RVSP 39.00 mmHg    Tricuspid Valve Max Velocity 301.00 cm/s    Triscuspid Valve Regurgitation Peak Gradient 36.00 mmHg    Right Atrial Area 4C 21.31 cm2    LA Area 4C 20.92 cm2    BP EF 65.1 55.0 - 100.0 %    LV Mass .3 67.0 - 162.0 g    LV Mass AL Index 136.3 43.0 - 95.0 g/m2    Left Atrium Minor Axis 2.51 cm   PROCALCITONIN    Collection Time: 07/26/21  4:33 PM   Result Value Ref Range    Procalcitonin 1.11 (H) 0 ng/mL   URINALYSIS W/ REFLEX CULTURE    Collection Time: 07/26/21  8:15 PM    Specimen: Urine   Result Value Ref Range    Color Yellow      Appearance Turbid (A) Clear      Specific gravity 1.010 1.003 - 1.030      pH (UA) 5.0 5.0 - 8.0      Protein 30 (A) Negative mg/dL    Glucose Negative Negative mg/dL    Ketone Negative Negative mg/dL    Bilirubin Negative Negative      Blood Moderate (A) Negative      Urobilinogen 0.1 0.1 - 1.0 EU/dL    Nitrites Negative Negative      Leukocyte Esterase Large (A) Negative      WBC >100 (H) 0 - 4 /hpf    RBC 20-50 0 - 5 /hpf    Bacteria 1+ (A) Negative /hpf    UA:UC IF INDICATED Urine Culture Ordered (A) Culture not indicated by UA result      Mucus Trace (A) Negative /lpf   CBC WITH AUTOMATED DIFF    Collection Time: 07/26/21  8:15 PM   Result Value Ref Range    WBC 21.2 (H) 3.6 - 11.0 K/uL    RBC 3.44 (L) 3.80 - 5.20 M/uL    HGB 10.2 (L) 11.5 - 16.0 g/dL    HCT 30.5 (L) 35.0 - 47.0 %    MCV 88.7 80.0 - 99.0 FL    MCH 29.7 26.0 - 34.0 PG    MCHC 33.4 30.0 - 36.5 g/dL    RDW 15.8 (H) 11.5 - 14.5 %    PLATELET 052 797 - 324 K/uL    MPV 11.0 8.9 - 12.9 FL    NRBC 0.0 0.0  WBC    ABSOLUTE NRBC 0.00 0.00 - 0.01 K/uL    NEUTROPHILS 85 (H) 32 - 75 %    LYMPHOCYTES 9 (L) 12 - 49 %    MONOCYTES 6 5 - 13 %    EOSINOPHILS 0 0 - 7 %    BASOPHILS 0 0 - 1 % IMMATURE GRANULOCYTES 0 %    ABS. NEUTROPHILS 18.0 (H) 1.8 - 8.0 K/UL    ABS. LYMPHOCYTES 1.9 0.8 - 3.5 K/UL    ABS. MONOCYTES 1.3 (H) 0.0 - 1.0 K/UL    ABS. EOSINOPHILS 0.0 0.0 - 0.4 K/UL    ABS. BASOPHILS 0.0 0.0 - 0.1 K/UL    ABS. IMM. GRANS. 0.0 K/UL    DF Manual      RBC COMMENTS Anisocytosis  1+       TYPE & SCREEN    Collection Time: 07/26/21  8:15 PM   Result Value Ref Range    Crossmatch Expiration 07/29/2021,2359     ABO/Rh(D) O Positive     Antibody screen Negative    METABOLIC PANEL, COMPREHENSIVE    Collection Time: 07/26/21  8:15 PM   Result Value Ref Range    Sodium 135 (L) 136 - 145 mmol/L    Potassium 3.1 (L) 3.5 - 5.1 mmol/L    Chloride 99 97 - 108 mmol/L    CO2 28 21 - 32 mmol/L    Anion gap 8 5 - 15 mmol/L    Glucose 128 (H) 65 - 100 mg/dL    BUN 36 (H) 6 - 20 mg/dL    Creatinine 1.93 (H) 0.55 - 1.02 mg/dL    BUN/Creatinine ratio 19 12 - 20      GFR est AA 30 (L) >60 ml/min/1.73m2    GFR est non-AA 25 (L) >60 ml/min/1.73m2    Calcium 8.0 (L) 8.5 - 10.1 mg/dL    Bilirubin, total 2.6 (H) 0.2 - 1.0 mg/dL    AST (SGOT) 38 (H) 15 - 37 U/L    ALT (SGPT) 35 12 - 78 U/L    Alk.  phosphatase 50 45 - 117 U/L    Protein, total 6.0 (L) 6.4 - 8.2 g/dL    Albumin 2.6 (L) 3.5 - 5.0 g/dL    Globulin 3.4 2.0 - 4.0 g/dL    A-G Ratio 0.8 (L) 1.1 - 2.2     BNP    Collection Time: 07/26/21  8:15 PM   Result Value Ref Range    NT pro-BNP 13,824 (H) <450 pg/mL   LACTIC ACID    Collection Time: 07/26/21  8:15 PM   Result Value Ref Range    Lactic acid 1.2 0.4 - 2.0 mmol/L   MRSA SCREEN - PCR (NASAL)    Collection Time: 07/26/21  8:15 PM   Result Value Ref Range    MRSA by PCR, Nasal Not Detected Not Detected     CREATININE, UR, RANDOM    Collection Time: 07/26/21 10:15 PM   Result Value Ref Range    Creatinine, urine 80.00 mg/dL   POTASSIUM, UR, RANDOM    Collection Time: 07/26/21 10:15 PM   Result Value Ref Range    Potassium urine, random 41 mmol/L   SODIUM, UR, RANDOM    Collection Time: 07/26/21 10:15 PM   Result Value Ref Range    Sodium,urine random 68 mmol/L   CHLORIDE, URINE RANDOM    Collection Time: 07/26/21 10:15 PM   Result Value Ref Range    Chloride,urine random 89 mmol/L   PROTEIN URINE, RANDOM    Collection Time: 07/26/21 10:15 PM   Result Value Ref Range    Protein, urine random 52 (H) 0.0 - 11.9 mg/dL   CBC WITH AUTOMATED DIFF    Collection Time: 07/27/21  5:00 AM   Result Value Ref Range    WBC 17.2 (H) 3.6 - 11.0 K/uL    RBC 3.15 (L) 3.80 - 5.20 M/uL    HGB 9.3 (L) 11.5 - 16.0 g/dL    HCT 27.9 (L) 35.0 - 47.0 %    MCV 88.6 80.0 - 99.0 FL    MCH 29.5 26.0 - 34.0 PG    MCHC 33.3 30.0 - 36.5 g/dL    RDW 15.9 (H) 11.5 - 14.5 %    PLATELET 528 (L) 884 - 400 K/uL    MPV 10.5 8.9 - 12.9 FL    NRBC 0.0 0.0  WBC    ABSOLUTE NRBC 0.00 0.00 - 0.01 K/uL    NEUTROPHILS 92 (H) 32 - 75 %    LYMPHOCYTES 2 (L) 12 - 49 %    MONOCYTES 4 (L) 5 - 13 %    EOSINOPHILS 0 0 - 7 %    BASOPHILS 0 0 - 1 %    IMMATURE GRANULOCYTES 2 (H) 0 - 0.5 %    ABS. NEUTROPHILS 15.8 (H) 1.8 - 8.0 K/UL    ABS. LYMPHOCYTES 0.4 (L) 0.8 - 3.5 K/UL    ABS. MONOCYTES 0.7 0.0 - 1.0 K/UL    ABS. EOSINOPHILS 0.0 0.0 - 0.4 K/UL    ABS. BASOPHILS 0.0 0.0 - 0.1 K/UL    ABS. IMM. GRANS. 0.3 (H) 0.00 - 0.04 K/UL    DF AUTOMATED     METABOLIC PANEL, COMPREHENSIVE    Collection Time: 07/27/21  5:00 AM   Result Value Ref Range    Sodium 133 (L) 136 - 145 mmol/L    Potassium 3.5 3.5 - 5.1 mmol/L    Chloride 101 97 - 108 mmol/L    CO2 26 21 - 32 mmol/L    Anion gap 6 5 - 15 mmol/L    Glucose 195 (H) 65 - 100 mg/dL    BUN 41 (H) 6 - 20 mg/dL    Creatinine 1.81 (H) 0.55 - 1.02 mg/dL    BUN/Creatinine ratio 23 (H) 12 - 20      GFR est AA 32 (L) >60 ml/min/1.73m2    GFR est non-AA 26 (L) >60 ml/min/1.73m2    Calcium 7.4 (L) 8.5 - 10.1 mg/dL    Bilirubin, total 1.8 (H) 0.2 - 1.0 mg/dL    AST (SGOT) 31 15 - 37 U/L    ALT (SGPT) 31 12 - 78 U/L    Alk.  phosphatase 50 45 - 117 U/L    Protein, total 5.5 (L) 6.4 - 8.2 g/dL    Albumin 2.3 (L) 3.5 - 5.0 g/dL    Globulin 3.2 2.0 - 4.0 g/dL    A-G Ratio 0.7 (L) 1.1 - 2.2     PROCALCITONIN    Collection Time: 07/27/21  5:00 AM   Result Value Ref Range    Procalcitonin 1.16 (H) 0 ng/mL   VANCOMYCIN, RANDOM    Collection Time: 07/27/21  5:00 AM   Result Value Ref Range    Vancomycin, random 14.7 ug/mL   C REACTIVE PROTEIN, QT    Collection Time: 07/27/21  5:00 AM   Result Value Ref Range    C-Reactive protein 29.60 (H) 0.00 - 0.60 mg/dL   MAGNESIUM    Collection Time: 07/27/21  5:00 AM   Result Value Ref Range    Magnesium 1.3 (L) 1.6 - 2.4 mg/dL        CT ABD PELV WO CONT   Final Result   Findings are most suggestive of cholecystitis given the stranding   and fluid centered in the region of the gallbladder. Duodenitis and pancreatitis   might be considered in the differential given the location. Nonobstructing right   nephrolithiasis, bilateral renal cysts. Findings in the lung bases suggestive of   CHF/pulmonary edema with bilateral effusions and compressive bilateral lower   lobe atelectasis. Three-vessel coronary arterial calcifications. Diffuse   aortoiliac arteriosclerotic calcifications. XR CHEST PORT   Final Result      US RUQ   Final Result   Gallbladder sludge, without demonstrated stones      XR CHEST PORT   Final Result      XR CHEST PORT   Final Result   Central pulmonary vascular prominence likely accounting for the fullness in the   beatrice. There are also likely coarsened interstitial opacities that could   represent early interstitial edema or other interstitial process. No focal   airspace consolidation is evident. XR CHEST PORT    (Results Pending)        PHYSICAL EXAM:    Physical Exam  Vitals reviewed. Constitutional:       General: She is not in acute distress. Appearance: She is not ill-appearing or diaphoretic. HENT:      Head: Normocephalic and atraumatic. Mouth/Throat:      Mouth: Mucous membranes are moist.      Pharynx: Oropharynx is clear.    Eyes:      Conjunctiva/sclera: Conjunctivae normal.      Pupils: Pupils are equal, round, and reactive to light. Cardiovascular:      Rate and Rhythm: Normal rate and regular rhythm. Pulses: Normal pulses. Heart sounds: Normal heart sounds. Pulmonary:      Effort: Pulmonary effort is normal.      Breath sounds: Normal breath sounds. No wheezing, rhonchi or rales. Chest:      Chest wall: No tenderness. Abdominal:      General: Abdomen is flat. There is no distension. Palpations: Abdomen is soft. There is no mass. Tenderness: There is abdominal tenderness (epigastric, moderate to palpation). Musculoskeletal:         General: No tenderness or deformity. Normal range of motion. Skin:     General: Skin is warm. Neurological:      General: No focal deficit present. Mental Status: She is alert and oriented to person, place, and time.        Data Review    Recent Results (from the past 24 hour(s))   ECHO ADULT COMPLETE    Collection Time: 07/26/21 10:30 AM   Result Value Ref Range    Aortic Valve Systolic Peak Velocity 579.54 cm/s    AoV PG 10.00 mmHg    Ao Root D 3.30 cm    IVSd 1.48 (A) 0.60 - 0.90 cm    LVIDd 4.45 3.90 - 5.30 cm    LVIDs 2.87 cm    LVOT Peak Velocity 116.00 cm/s    LVOT Peak Gradient 5.00 mmHg    LVPWd 1.44 (A) 0.60 - 0.90 cm    LV E' Septal Velocity 10.20 cm/s    LV ED Vol A2C 88.10 cm3    LV ES Vol A2C 23.60 cm3    Left Atrium Major Axis 4.80 cm    Mitral Valve Deceleration [place] 5,320.00 mm/s2    Mitral Valve Deceleration [place] 5,320.00 mm/s2    Mitral Valve Pressure Half-time 84.00 ms    MV Mean Gradient 2.00 mmHg    Mitral Valve Annulus Velocity Time Integral 39.30 cm    Mitral Valve Max Velocity 159.00 cm/s    MV Peak Gradient 10.00 mmHg    MVA (PHT) 2.62 cm2    Pulmonic Valve Max Velocity 122.00 cm/s    Pulmonic Valve Systolic Peak Instantaneous Gradient 6.00 mmHg    Est. RA Pressure 3.00 mmHg    RVIDd 2.82 cm    RVSP 39.00 mmHg    Tricuspid Valve Max Velocity 301.00 cm/s    Triscuspid Valve Regurgitation Peak Gradient 36.00 mmHg    Right Atrial Area 4C 21.31 cm2    LA Area 4C 20.92 cm2    BP EF 65.1 55.0 - 100.0 %    LV Mass .3 67.0 - 162.0 g    LV Mass AL Index 136.3 43.0 - 95.0 g/m2    Left Atrium Minor Axis 2.51 cm   PROCALCITONIN    Collection Time: 07/26/21  4:33 PM   Result Value Ref Range    Procalcitonin 1.11 (H) 0 ng/mL   URINALYSIS W/ REFLEX CULTURE    Collection Time: 07/26/21  8:15 PM    Specimen: Urine   Result Value Ref Range    Color Yellow      Appearance Turbid (A) Clear      Specific gravity 1.010 1.003 - 1.030      pH (UA) 5.0 5.0 - 8.0      Protein 30 (A) Negative mg/dL    Glucose Negative Negative mg/dL    Ketone Negative Negative mg/dL    Bilirubin Negative Negative      Blood Moderate (A) Negative      Urobilinogen 0.1 0.1 - 1.0 EU/dL    Nitrites Negative Negative      Leukocyte Esterase Large (A) Negative      WBC >100 (H) 0 - 4 /hpf    RBC 20-50 0 - 5 /hpf    Bacteria 1+ (A) Negative /hpf    UA:UC IF INDICATED Urine Culture Ordered (A) Culture not indicated by UA result      Mucus Trace (A) Negative /lpf   CBC WITH AUTOMATED DIFF    Collection Time: 07/26/21  8:15 PM   Result Value Ref Range    WBC 21.2 (H) 3.6 - 11.0 K/uL    RBC 3.44 (L) 3.80 - 5.20 M/uL    HGB 10.2 (L) 11.5 - 16.0 g/dL    HCT 30.5 (L) 35.0 - 47.0 %    MCV 88.7 80.0 - 99.0 FL    MCH 29.7 26.0 - 34.0 PG    MCHC 33.4 30.0 - 36.5 g/dL    RDW 15.8 (H) 11.5 - 14.5 %    PLATELET 713 166 - 184 K/uL    MPV 11.0 8.9 - 12.9 FL    NRBC 0.0 0.0  WBC    ABSOLUTE NRBC 0.00 0.00 - 0.01 K/uL    NEUTROPHILS 85 (H) 32 - 75 %    LYMPHOCYTES 9 (L) 12 - 49 %    MONOCYTES 6 5 - 13 %    EOSINOPHILS 0 0 - 7 %    BASOPHILS 0 0 - 1 %    IMMATURE GRANULOCYTES 0 %    ABS. NEUTROPHILS 18.0 (H) 1.8 - 8.0 K/UL    ABS. LYMPHOCYTES 1.9 0.8 - 3.5 K/UL    ABS. MONOCYTES 1.3 (H) 0.0 - 1.0 K/UL    ABS. EOSINOPHILS 0.0 0.0 - 0.4 K/UL    ABS. BASOPHILS 0.0 0.0 - 0.1 K/UL    ABS. IMM.  GRANS. 0.0 K/UL    DF Manual      RBC COMMENTS Anisocytosis  1+       TYPE & SCREEN    Collection Time: 07/26/21  8:15 PM   Result Value Ref Range    Crossmatch Expiration 07/29/2021,2359     ABO/Rh(D) O Positive     Antibody screen Negative    METABOLIC PANEL, COMPREHENSIVE    Collection Time: 07/26/21  8:15 PM   Result Value Ref Range    Sodium 135 (L) 136 - 145 mmol/L    Potassium 3.1 (L) 3.5 - 5.1 mmol/L    Chloride 99 97 - 108 mmol/L    CO2 28 21 - 32 mmol/L    Anion gap 8 5 - 15 mmol/L    Glucose 128 (H) 65 - 100 mg/dL    BUN 36 (H) 6 - 20 mg/dL    Creatinine 1.93 (H) 0.55 - 1.02 mg/dL    BUN/Creatinine ratio 19 12 - 20      GFR est AA 30 (L) >60 ml/min/1.73m2    GFR est non-AA 25 (L) >60 ml/min/1.73m2    Calcium 8.0 (L) 8.5 - 10.1 mg/dL    Bilirubin, total 2.6 (H) 0.2 - 1.0 mg/dL    AST (SGOT) 38 (H) 15 - 37 U/L    ALT (SGPT) 35 12 - 78 U/L    Alk.  phosphatase 50 45 - 117 U/L    Protein, total 6.0 (L) 6.4 - 8.2 g/dL    Albumin 2.6 (L) 3.5 - 5.0 g/dL    Globulin 3.4 2.0 - 4.0 g/dL    A-G Ratio 0.8 (L) 1.1 - 2.2     BNP    Collection Time: 07/26/21  8:15 PM   Result Value Ref Range    NT pro-BNP 13,824 (H) <450 pg/mL   LACTIC ACID    Collection Time: 07/26/21  8:15 PM   Result Value Ref Range    Lactic acid 1.2 0.4 - 2.0 mmol/L   MRSA SCREEN - PCR (NASAL)    Collection Time: 07/26/21  8:15 PM   Result Value Ref Range    MRSA by PCR, Nasal Not Detected Not Detected     CREATININE, UR, RANDOM    Collection Time: 07/26/21 10:15 PM   Result Value Ref Range    Creatinine, urine 80.00 mg/dL   POTASSIUM, UR, RANDOM    Collection Time: 07/26/21 10:15 PM   Result Value Ref Range    Potassium urine, random 41 mmol/L   SODIUM, UR, RANDOM    Collection Time: 07/26/21 10:15 PM   Result Value Ref Range    Sodium,urine random 68 mmol/L   CHLORIDE, URINE RANDOM    Collection Time: 07/26/21 10:15 PM   Result Value Ref Range    Chloride,urine random 89 mmol/L   PROTEIN URINE, RANDOM    Collection Time: 07/26/21 10:15 PM   Result Value Ref Range    Protein, urine random 52 (H) 0.0 - 11.9 mg/dL   CBC WITH AUTOMATED DIFF    Collection Time: 07/27/21  5:00 AM   Result Value Ref Range    WBC 17.2 (H) 3.6 - 11.0 K/uL    RBC 3.15 (L) 3.80 - 5.20 M/uL    HGB 9.3 (L) 11.5 - 16.0 g/dL    HCT 27.9 (L) 35.0 - 47.0 %    MCV 88.6 80.0 - 99.0 FL    MCH 29.5 26.0 - 34.0 PG    MCHC 33.3 30.0 - 36.5 g/dL    RDW 15.9 (H) 11.5 - 14.5 %    PLATELET 841 (L) 267 - 400 K/uL    MPV 10.5 8.9 - 12.9 FL    NRBC 0.0 0.0  WBC    ABSOLUTE NRBC 0.00 0.00 - 0.01 K/uL    NEUTROPHILS 92 (H) 32 - 75 %    LYMPHOCYTES 2 (L) 12 - 49 %    MONOCYTES 4 (L) 5 - 13 %    EOSINOPHILS 0 0 - 7 %    BASOPHILS 0 0 - 1 %    IMMATURE GRANULOCYTES 2 (H) 0 - 0.5 %    ABS. NEUTROPHILS 15.8 (H) 1.8 - 8.0 K/UL    ABS. LYMPHOCYTES 0.4 (L) 0.8 - 3.5 K/UL    ABS. MONOCYTES 0.7 0.0 - 1.0 K/UL    ABS. EOSINOPHILS 0.0 0.0 - 0.4 K/UL    ABS. BASOPHILS 0.0 0.0 - 0.1 K/UL    ABS. IMM. GRANS. 0.3 (H) 0.00 - 0.04 K/UL    DF AUTOMATED     METABOLIC PANEL, COMPREHENSIVE    Collection Time: 07/27/21  5:00 AM   Result Value Ref Range    Sodium 133 (L) 136 - 145 mmol/L    Potassium 3.5 3.5 - 5.1 mmol/L    Chloride 101 97 - 108 mmol/L    CO2 26 21 - 32 mmol/L    Anion gap 6 5 - 15 mmol/L    Glucose 195 (H) 65 - 100 mg/dL    BUN 41 (H) 6 - 20 mg/dL    Creatinine 1.81 (H) 0.55 - 1.02 mg/dL    BUN/Creatinine ratio 23 (H) 12 - 20      GFR est AA 32 (L) >60 ml/min/1.73m2    GFR est non-AA 26 (L) >60 ml/min/1.73m2    Calcium 7.4 (L) 8.5 - 10.1 mg/dL    Bilirubin, total 1.8 (H) 0.2 - 1.0 mg/dL    AST (SGOT) 31 15 - 37 U/L    ALT (SGPT) 31 12 - 78 U/L    Alk.  phosphatase 50 45 - 117 U/L    Protein, total 5.5 (L) 6.4 - 8.2 g/dL    Albumin 2.3 (L) 3.5 - 5.0 g/dL    Globulin 3.2 2.0 - 4.0 g/dL    A-G Ratio 0.7 (L) 1.1 - 2.2     PROCALCITONIN    Collection Time: 07/27/21  5:00 AM   Result Value Ref Range    Procalcitonin 1.16 (H) 0 ng/mL   VANCOMYCIN, RANDOM    Collection Time: 07/27/21  5:00 AM   Result Value Ref Range    Vancomycin, random 14.7 ug/mL   C REACTIVE PROTEIN, QT    Collection Time: 07/27/21  5:00 AM   Result Value Ref Range    C-Reactive protein 29.60 (H) 0.00 - 0.60 mg/dL   MAGNESIUM    Collection Time: 07/27/21  5:00 AM   Result Value Ref Range    Magnesium 1.3 (L) 1.6 - 2.4 mg/dL        Assessment/Plan:     Active Problems:    Unstable angina ([address] (7/25/2021)      Chest pain with moderate risk for cardiac etiology (7/25/2021)      Impression:     1. Chest pain r/o ACS  2. CHF  3. A-fib  4. CAD  5. Hypertension  6. Hyperlipidemia  7. Hypothyroidism  8. Leukocytosis  9. Hypokalemia  10. Vaginal bleeding     Plan:    Septic shock secondary to cholecystitisof note patient found to be in septic shock yesterday not responding to fluid resuscitation, currently on pressors based on patient's clinical presentation multifactorial including secondary to cholecystitis based on CT abdomen pelvis results versus urinary tract infection  Follow-up blood cultures  Continue pressor support  Continue cefepime for antibiotic coverage  Obtain general surgery consult further evaluation    Urinary tract infectionpatient presents with septic shock with likely component of urinary tract infection given urinalysis  Follow blood cultures  Continue pressor support  Follow-up urine culture  Continue cefepime for antibiotic coverage    Acute decompensated heart failure with preserved ejection fractionof note patient found to be in acute decompensated heart failure with preserved ejection fraction with significant bilateral pleural effusions, currently doing well on dobutamine gtt. Frequent intake and output monitoring  Daily weights  Continue telemetry monitoring  Continue dobutamine gtt.   Lasix 40 mg IV twice daily  Follow-up cardiology recommendations    Hypomagnesemiareplete magnesium and repeat check magnesium    Hypokalemiareplete potassium and recheck potassium    Acute kidney injurycurrently serum creatinine downtrending, differentials include prerenal versus renal versus postrenal etiologies  Obtain urinary electrolytes calculate fractional excretion of sodium  CT abdomen pelvis negative for any anatomic abnormalities  Continue to trend serum creatinine  Obtain nephrology consult    Atrial fibrillation with RVRof note patient found to be in atrial fibrillation with RVR upon presentation, currently remains hemodynamically stable at this time  Continue telemetry monitoring  Continue amiodarone GTT  Hold anticoagulation in the setting of patient possibly needing procedure  Follow cardiology recommendations    Hypertensionholding home antihypertensive medications    Vaginal bleedingof note patient found to have vaginal bleeding, status post total abdominal hysterectomy due to endometrial cancer  Maintain active type and screen  Continue to trend hemoglobin and hematocrit  Follow gynecology recommendations    ProphylaxisSCDs  FENn.p.o., replete potassium and magnesium  DNR, patient surrogate decision-maker and power of  is daughter Melva Alas, discussed in detail at bedside, patient DNR at this time, awaiting input from general surgery as well as cardiology about perioperative risk, will revisit goals of care discussion as well as possible comfort care later this afternoon after input from cardiology as well as general surgery.     Critical care time spent 45 minutes involving direct patient care as well as reviewing patient's labs and coordination of care with nursing staff

## 2021-07-27 NOTE — PROGRESS NOTES
OT eval order received and acknowledged, however, patient transferred to ICU from [address] [place] [address] due to medical decline prior to completion of evaluation. OT eval orders will be discontinued and will need new OT eval order once pt medically stable for evaluation. Thank you.

## 2021-07-27 NOTE — CONSULTS
Consult Date: 7/27/2021    Consults    Subjective   The patient is an 59-year-old female who presented to the emergency department on July 25 complaining of substernal chest pain as well as right and left upper quadrant pain. She also had associated nausea. In the emergency department she was found to be in atrial fibrillation with RVR. According to her daughter she was diagnosed with atrial fibrillation recently and was on Eliquis however because of the cost was switched to Coumadin. She is subtherapeutic on her Coumadin currently. She does have a history of coronary artery disease as well as atrial fibrillation and cardiology was consulted. At the time of their consultation they noted that her chest pain had improved and she had no shortness of breath. She was primarily at that time complaining of pain in the abdomen that radiated to the back. She had nonischemic serial EKGs. Negative troponins. She was in atrial fibrillation cardiology recommend continue metoprolol. Yesterday morning she was continued to have right upper quadrant pain and was noted to have right upper quadrant tenderness. A CT of the abdomen pelvis and right upper quadrant ultrasound were ordered. She was being treated prophylactically with Rocephin. Did have 1+ bacteria in her UA. Chest x-ray showed central pulmonary vascular prominence. She was diuresed at this time. She is on no Lasix at home. She received 40 mg Lasix IV on July 25, July 26, and this a.m. Her creatinine went from 0.93-1.81 today. Her urine output overnight has dropped from May 240 cc of urine. Yesterday evening the patient became hypotensive with A. fib with RVR. She was given IV fluid boluses. She then had echo that showed low right-sided pressures secondary to overdiuresis and hypovolemia. She was transferred to the ICU and started on pressors. Amiodarone was started for heart rate.   At this time Rocephin was discontinued and cefepime, vancomycin and doxycycline were started. Apparently there was a concern for atypical pneumonia. The patient CT that she had finally resulted last night and demonstrated a very distended gallbladder with wall thickening and diffuse surrounding fat stranding and fluid. There was free fluid around the gallbladder tract in the right colic gutter. Ultrasound demonstrated distended gallbladder with sludge. This a.m. the patient continues on pressors. She was also continued on amiodarone. She continues to be tachycardic. She continues to complain of significant pain in the right upper quadrant. She currently denies chest pain.   Past Medical History:   Diagnosis Date    [facility] ([facility]     CAD (coronary artery disease)     Diabetes mellitus ([facility]     resolved    Endometrial cancer ([facility]     s/p TAHBSO, RT in 1990s    Hypertension     Skin cancer       Past Surgical History:   Procedure Laterality Date    HX HYSTERECTOMY       Family History   Problem Relation Age of Onset    Hypertension Father     Cancer Father     Hypertension Brother     Ovarian Cancer Other         daughter, unsure if genetic testing performed      Social History     Tobacco Use    Smoking status: Former Smoker    Smokeless tobacco: Never Used    Tobacco comment: quit >50 yrs ago   Substance Use Topics    Alcohol use: Not on file       Current Facility-Administered Medications   Medication Dose Route Frequency Provider Last Rate Last Admin    NOREPINephrine (LEVOPHED) 8 mg in 5% dextrose 250mL (32 mcg/mL) infusion  0.5-30 mcg/min IntraVENous TITRATE Haydee Bower MD 18.8 mL/hr at 07/27/21 0351 10 mcg/min at 07/27/21 0351    magnesium sulfate 3 g in 0.9% sodium chloride 100 mL IVPB  3 g IntraVENous Anup Bates MD        potassium chloride (K-DUR, KLOR-CON) SR tablet 40 mEq  40 mEq Oral NOW Anup Bower MD        [Held by provider] furosemide (LASIX) injection 40 mg  40 mg IntraVENous Q12H Summer Bower MD   40 mg at 07/27/21 0759    0.9% sodium chloride infusion  75 mL/hr IntraVENous CONTINUOUS Teresa Kellogg PA-C 75 mL/hr at 07/26/21 1400 75 mL/hr at 07/26/21 1400    doxycycline (VIBRAMYCIN) 100 mg in 0.9% sodium chloride (MBP/ADV) 100 mL MBP  100 mg IntraVENous Q12H Teresa Kellogg PA-C 100 mL/hr at 07/27/21 0525 100 mg at 07/27/21 0525    cefepime (MAXIPIME) 2 g in sterile water (preservative free) 10 mL IV syringe  2 g IntraVENous Q12H Teresa Kellogg PA-C   IV Completed at 07/27/21 0815    amiodarone (CORDARONE) 375 mg in dextrose 5% 250 mL infusion  1 mg/min IntraVENous CONTINUOUS Guanaco KAUR MD 40 mL/hr at 07/27/21 0238 1 mg/min at 07/27/21 0238    DOBUTamine (DOBUTREX) 500 mg/250 mL (2,000 mcg/mL) infusion  0-10 mcg/kg/min IntraVENous TITRATE Jennifer Valverde MD 51.2 mL/hr at 07/27/21 0340 20 mcg/kg/min at 07/27/21 0340    potassium chloride 10 mEq in 100 ml IVPB  10 mEq IntraVENous ONCE Anup Bower  mL/hr at 07/27/21 0800 10 mEq at 07/27/21 0800    morphine injection 2 mg  2 mg IntraVENous Q6H PRN Jennifer Valverde MD   2 mg at 07/27/21 0630    sodium chloride (NS) flush 5-40 mL  5-40 mL IntraVENous Q8H Nandanielelee Eladio, NP   10 mL at 07/26/21 2124    sodium chloride (NS) flush 5-40 mL  5-40 mL IntraVENous PRN Alizacylee [place], NP        acetaminophen (TYLENOL) tablet 650 mg  650 mg Oral Q6H PRN Nancylee [place], NP        Boyd Costello acetaminophen (TYLENOL) suppository 650 mg  650 mg Rectal Q6H PRN Nancylee [place], NP        polyethylene glycol (MIRALAX) packet 17 g  17 g Oral DAILY PRN Nancylee [place], NP        bisacodyL (DULCOLAX) tablet 5 mg  5 mg Oral DAILY PRN Nancylee Coleville, NP        ondansetron (ZOFRAN ODT) tablet 4 mg  4 mg Oral Q6H PRN Nancylee [place], NP        Or    ondansetron [facility]) injection 4 mg  4 mg IntraVENous Q6H PRN Nancylee [place], NP   4 mg at 07/26/21 1235    famotidine (PEPCID) tablet 20 mg  20 mg Oral BID [facility]   20 mg at 07/26/21 4153    enoxaparin (LOVENOX) injection 40 mg  40 mg SubCUTAneous DAILY Rudolpho Fairly, NP   40 mg at 07/27/21 0800    atorvastatin (LIPITOR) tablet 20 mg  20 mg Oral DAILY [facility]   20 mg at 07/26/21 0480    calcium-vitamin D 600 mg(1,500mg) -200 unit per tablet 1 Tablet  1 Tablet Oral BID Rudolpho Fairly, NP   1 Tablet at 07/26/21 8018    cholecalciferol (VITAMIN D3) (1000 Units /25 mcg) tablet 2,000 Units  2,000 Units Oral DAILY [facility] NP   2,000 Units at 07/26/21 7230    [Held by provider] isosorbide mononitrate ER (IMDUR) tablet 30 mg  30 mg Oral DAILY [facility]   30 mg at 07/26/21 5300    levothyroxine (SYNTHROID) tablet 75 mcg  75 mcg Oral ACB [facility], NP   75 mcg at 07/26/21 0811    [Held by provider] lisinopriL (PRINIVIL, ZESTRIL) tablet 20 mg  20 mg Oral DAILY [facility] NP   20 mg at 07/26/21 5651    [Held by provider] metoprolol succinate (TOPROL-XL) XL tablet 50 mg  50 mg Oral DAILY [facility] NP   50 mg at 07/26/21 0809    [Held by provider] minoxidiL (LONITEN) tablet 5 mg  5 mg Oral DAILY [facility] NP   5 mg at 07/26/21 5639        Review of Systems   All other systems reviewed and are negative. Objective     Vital signs for last 24 hours:  Visit Vitals  BP (!) 101/52 (BP 1 Location: Right upper arm, BP Patient Position: At rest)   Pulse (!) 147   Temp 99.3 °F (37.4 °C)   Resp 19   Ht 5' 4\" (1.626 m)   Wt 188 lb (85.3 kg)   SpO2 96%   Breastfeeding No   BMI 32.27 kg/m²       Intake/Output this shift:  Current Shift: No intake/output data recorded.   Last 3 Shifts: 07/25 1901 - 07/27 0700  In: 3279.2 [I.V.:3279.2]  Out: 1440 [Urine:1440]    Data Review:   Recent Results (from the past 24 hour(s))   ECHO ADULT COMPLETE    Collection Time: 07/26/21 10:30 AM   Result Value Ref Range    Aortic Valve Systolic Peak Velocity 156.00 cm/s    AoV PG 10.00 mmHg    Ao Root D 3.30 cm    IVSd 1.48 (A) 0.60 - 0.90 cm    LVIDd 4.45 3.90 - 5.30 cm    LVIDs 2.87 cm    LVOT Peak Velocity 116.00 cm/s    LVOT Peak Gradient 5.00 mmHg    LVPWd 1.44 (A) 0.60 - 0.90 cm    LV E' Septal Velocity 10.20 cm/s    LV ED Vol A2C 88.10 cm3    LV ES Vol A2C 23.60 cm3    Left Atrium Major Axis 4.80 cm    Mitral Valve Deceleration Hernando 5,320.00 mm/s2    Mitral Valve Deceleration Hernando 5,320.00 mm/s2    Mitral Valve Pressure Half-time 84.00 ms    MV Mean Gradient 2.00 mmHg    Mitral Valve Annulus Velocity Time Integral 39.30 cm    Mitral Valve Max Velocity 159.00 cm/s    MV Peak Gradient 10.00 mmHg    MVA (PHT) 2.62 cm2    Pulmonic Valve Max Velocity 122.00 cm/s    Pulmonic Valve Systolic Peak Instantaneous Gradient 6.00 mmHg    Est. RA Pressure 3.00 mmHg    RVIDd 2.82 cm    RVSP 39.00 mmHg    Tricuspid Valve Max Velocity 301.00 cm/s    Triscuspid Valve Regurgitation Peak Gradient 36.00 mmHg    Right Atrial Area 4C 21.31 cm2    LA Area 4C 20.92 cm2    BP EF 65.1 55.0 - 100.0 %    LV Mass .3 67.0 - 162.0 g    LV Mass AL Index 136.3 43.0 - 95.0 g/m2    Left Atrium Minor Axis 2.51 cm   PROCALCITONIN    Collection Time: 07/26/21  4:33 PM   Result Value Ref Range    Procalcitonin 1.11 (H) 0 ng/mL   URINALYSIS W/ REFLEX CULTURE    Collection Time: 07/26/21  8:15 PM    Specimen: Urine   Result Value Ref Range    Color Yellow      Appearance Turbid (A) Clear      Specific gravity 1.010 1.003 - 1.030      pH (UA) 5.0 5.0 - 8.0      Protein 30 (A) Negative mg/dL    Glucose Negative Negative mg/dL    Ketone Negative Negative mg/dL    Bilirubin Negative Negative      Blood Moderate (A) Negative      Urobilinogen 0.1 0.1 - 1.0 EU/dL    Nitrites Negative Negative      Leukocyte Esterase Large (A) Negative      WBC >100 (H) 0 - 4 /hpf    RBC 20-50 0 - 5 /hpf    Bacteria 1+ (A) Negative /hpf    UA:UC IF INDICATED Urine Culture Ordered (A) Culture not indicated by UA result      Mucus Trace (A) Negative /lpf   CBC WITH AUTOMATED DIFF    Collection Time: 07/26/21  8:15 PM   Result Value Ref Range    WBC 21.2 (H) 3.6 - 11.0 K/uL    RBC 3.44 (L) 3.80 - 5.20 M/uL    HGB 10.2 (L) 11.5 - 16.0 g/dL    HCT 30.5 (L) 35.0 - 47.0 %    MCV 88.7 80.0 - 99.0 FL    MCH 29.7 26.0 - 34.0 PG    MCHC 33.4 30.0 - 36.5 g/dL    RDW 15.8 (H) 11.5 - 14.5 %    PLATELET 715 734 - 513 K/uL    MPV 11.0 8.9 - 12.9 FL    NRBC 0.0 0.0  WBC    ABSOLUTE NRBC 0.00 0.00 - 0.01 K/uL    NEUTROPHILS 85 (H) 32 - 75 %    LYMPHOCYTES 9 (L) 12 - 49 %    MONOCYTES 6 5 - 13 %    EOSINOPHILS 0 0 - 7 %    BASOPHILS 0 0 - 1 %    IMMATURE GRANULOCYTES 0 %    ABS. NEUTROPHILS 18.0 (H) 1.8 - 8.0 K/UL    ABS. LYMPHOCYTES 1.9 0.8 - 3.5 K/UL    ABS. MONOCYTES 1.3 (H) 0.0 - 1.0 K/UL    ABS. EOSINOPHILS 0.0 0.0 - 0.4 K/UL    ABS. BASOPHILS 0.0 0.0 - 0.1 K/UL    ABS. IMM. GRANS. 0.0 K/UL    DF Manual      RBC COMMENTS Anisocytosis  1+       TYPE & SCREEN    Collection Time: 07/26/21  8:15 PM   Result Value Ref Range    Crossmatch Expiration 07/29/2021,2359     ABO/Rh(D) O Positive     Antibody screen Negative    METABOLIC PANEL, COMPREHENSIVE    Collection Time: 07/26/21  8:15 PM   Result Value Ref Range    Sodium 135 (L) 136 - 145 mmol/L    Potassium 3.1 (L) 3.5 - 5.1 mmol/L    Chloride 99 97 - 108 mmol/L    CO2 28 21 - 32 mmol/L    Anion gap 8 5 - 15 mmol/L    Glucose 128 (H) 65 - 100 mg/dL    BUN 36 (H) 6 - 20 mg/dL    Creatinine 1.93 (H) 0.55 - 1.02 mg/dL    BUN/Creatinine ratio 19 12 - 20      GFR est AA 30 (L) >60 ml/min/1.73m2    GFR est non-AA 25 (L) >60 ml/min/1.73m2    Calcium 8.0 (L) 8.5 - 10.1 mg/dL    Bilirubin, total 2.6 (H) 0.2 - 1.0 mg/dL    AST (SGOT) 38 (H) 15 - 37 U/L    ALT (SGPT) 35 12 - 78 U/L    Alk.  phosphatase 50 45 - 117 U/L    Protein, total 6.0 (L) 6.4 - 8.2 g/dL    Albumin 2.6 (L) 3.5 - 5.0 g/dL    Globulin 3.4 2.0 - 4.0 g/dL    A-G Ratio 0.8 (L) 1.1 - 2.2     BNP    Collection Time: 07/26/21  8:15 PM   Result Value Ref Range    NT pro-BNP 13,824 (H) <450 pg/mL   LACTIC ACID    Collection Time: 07/26/21  8:15 PM   Result Value Ref Range    Lactic acid 1.2 0.4 - 2.0 mmol/L   MRSA SCREEN - PCR (NASAL)    Collection Time: 07/26/21  8:15 PM   Result Value Ref Range    MRSA by PCR, Nasal Not Detected Not Detected     CREATININE, UR, RANDOM    Collection Time: 07/26/21 10:15 PM   Result Value Ref Range    Creatinine, urine 80.00 mg/dL   POTASSIUM, UR, RANDOM    Collection Time: 07/26/21 10:15 PM   Result Value Ref Range    Potassium urine, random 41 mmol/L   SODIUM, UR, RANDOM    Collection Time: 07/26/21 10:15 PM   Result Value Ref Range    Sodium,urine random 68 mmol/L   CHLORIDE, URINE RANDOM    Collection Time: 07/26/21 10:15 PM   Result Value Ref Range    Chloride,urine random 89 mmol/L   PROTEIN URINE, RANDOM    Collection Time: 07/26/21 10:15 PM   Result Value Ref Range    Protein, urine random 52 (H) 0.0 - 11.9 mg/dL   CBC WITH AUTOMATED DIFF    Collection Time: 07/27/21  5:00 AM   Result Value Ref Range    WBC 17.2 (H) 3.6 - 11.0 K/uL    RBC 3.15 (L) 3.80 - 5.20 M/uL    HGB 9.3 (L) 11.5 - 16.0 g/dL    HCT 27.9 (L) 35.0 - 47.0 %    MCV 88.6 80.0 - 99.0 FL    MCH 29.5 26.0 - 34.0 PG    MCHC 33.3 30.0 - 36.5 g/dL    RDW 15.9 (H) 11.5 - 14.5 %    PLATELET 429 (L) 885 - 400 K/uL    MPV 10.5 8.9 - 12.9 FL    NRBC 0.0 0.0  WBC    ABSOLUTE NRBC 0.00 0.00 - 0.01 K/uL    NEUTROPHILS 92 (H) 32 - 75 %    LYMPHOCYTES 2 (L) 12 - 49 %    MONOCYTES 4 (L) 5 - 13 %    EOSINOPHILS 0 0 - 7 %    BASOPHILS 0 0 - 1 %    IMMATURE GRANULOCYTES 2 (H) 0 - 0.5 %    ABS. NEUTROPHILS 15.8 (H) 1.8 - 8.0 K/UL    ABS. LYMPHOCYTES 0.4 (L) 0.8 - 3.5 K/UL    ABS. MONOCYTES 0.7 0.0 - 1.0 K/UL    ABS. EOSINOPHILS 0.0 0.0 - 0.4 K/UL    ABS. BASOPHILS 0.0 0.0 - 0.1 K/UL    ABS. IMM.  GRANS. 0.3 (H) 0.00 - 0.04 K/UL    DF AUTOMATED     METABOLIC PANEL, COMPREHENSIVE    Collection Time: 07/27/21  5:00 AM   Result Value Ref Range    Sodium 133 (L) 136 - 145 mmol/L    Potassium 3.5 3.5 - 5.1 mmol/L    Chloride 101 97 - 108 mmol/L    CO2 26 21 - 32 mmol/L    Anion gap 6 5 - 15 mmol/L    Glucose 195 (H) 65 - 100 mg/dL    BUN 41 (H) 6 - 20 mg/dL    Creatinine 1.81 (H) 0.55 - 1.02 mg/dL    BUN/Creatinine ratio 23 (H) 12 - 20      GFR est AA 32 (L) >60 ml/min/1.73m2    GFR est non-AA 26 (L) >60 ml/min/1.73m2    Calcium 7.4 (L) 8.5 - 10.1 mg/dL    Bilirubin, total 1.8 (H) 0.2 - 1.0 mg/dL    AST (SGOT) 31 15 - 37 U/L    ALT (SGPT) 31 12 - 78 U/L    Alk. phosphatase 50 45 - 117 U/L    Protein, total 5.5 (L) 6.4 - 8.2 g/dL    Albumin 2.3 (L) 3.5 - 5.0 g/dL    Globulin 3.2 2.0 - 4.0 g/dL    A-G Ratio 0.7 (L) 1.1 - 2.2     PROCALCITONIN    Collection Time: 07/27/21  5:00 AM   Result Value Ref Range    Procalcitonin 1.16 (H) 0 ng/mL   VANCOMYCIN, RANDOM    Collection Time: 07/27/21  5:00 AM   Result Value Ref Range    Vancomycin, random 14.7 ug/mL   C REACTIVE PROTEIN, QT    Collection Time: 07/27/21  5:00 AM   Result Value Ref Range    C-Reactive protein 29.60 (H) 0.00 - 0.60 mg/dL   MAGNESIUM    Collection Time: 07/27/21  5:00 AM   Result Value Ref Range    Magnesium 1.3 (L) 1.6 - 2.4 mg/dL       Physical Exam  Constitutional:       Comments: Elderly female appears her stated age in mild distress, awake and conversant. HENT:      Head: Normocephalic and atraumatic. Cardiovascular:      Rate and Rhythm: Tachycardia present. Pulmonary:      Effort: Pulmonary effort is normal.   Abdominal:      General: There is no distension. Palpations: Abdomen is soft. Tenderness: There is abdominal tenderness (Tender palpation in the right upper quadrant). Skin:     General: Skin is warm and dry. Neurological:      Mental Status: She is oriented to person, place, and time. Psychiatric:         Mood and Affect: Mood normal.         Thought Content:  Thought content normal.         Judgment: Judgment normal.     Assessment and plan:  55-year-old female admitted after presenting with substernal chest pain and upper abdominal pain. She has become progressively septic. Her abdominal pain continues to be present. CT scan massively dilated gallbladder with pericholecystic fluid tracking the right gutter. Ultrasound does not demonstrate acute cholecystitis interestingly but does demonstrate distended gallbladder. I had extensive discussion with the patient and her family in the room and recommended a percutaneous cholecystostomy tube. At this point given the fact that her creatinine has gone up, renal function has deteriorated, she is on pressors, heart rate is not well controlled I would recommend against surgical intervention. The patient and her family are agreeable to a percutaneous cholecystostomy tube.

## 2021-07-27 NOTE — PROGRESS NOTES
Patient currently on PT caseload however patient transferred to ICU from [facility][1]  due to medical decline. Will need new PT eval order once pt medically stable for reassessment. Thank you.

## 2021-07-27 NOTE — PROGRESS NOTES
Consult for Vancomycin Dosing by Pharmacy by [address] [place] [address] provided for this 80y.o. year old female , for indication of Sepsis    Day of Therapy: 01  Goal of Level(s): 15-20mcg/dL    Other Current Antibiotics:Cefepime    Results       Procedure Component Value Units Date/Time    CULTURE, BLOOD [[id number]] Collected: 07/26/21 1633    Order Status: Completed Specimen: Blood Updated: 07/26/21 1718    CULTURE, URINE [[id number]]     Order Status: Sent Specimen: Urine from Clean catch     COVID-19 RAPID TEST [[id number]] Collected: 07/25/21 1252    Order Status: Completed Specimen: Nasopharyngeal Updated: 07/25/21 1348     Specimen source Nasopharyngeal        COVID-19 rapid test Not Detected        Comment: Rapid Abbott ID Now   Rapid NAAT:  The specimen is NEGATIVE for SARS-CoV-2, the novel coronavirus associated with COVID-19. Negative results should be treated as presumptive and, if inconsistent with clinical signs and symptoms or necessary for patient management, should be tested with an alternative molecular assay. Negative results do not preclude SARS-CoV-2 infection and should not be used as the sole basis for patient management decisions. This test has been authorized by the FDA under   an Emergency Use Authorization (EUA) for use by authorized laboratories. Fact sheet for Healthcare Providers: ConventionUpdate.co.nz Fact sheet for Patients: ConventionUpdate.co.nz   Methodology: Isothermal Nucleic Acid Amplification                 New Regimen:   Patient has been scheduled to receive Vanc 1g tonight and Random tomorrow AM.  Pharmacy to follow daily and will make changes to dose and/or frequency based on clinical status.   _________________________________     Pharmacist Alisa Rod PHARMD

## 2021-07-27 NOTE — PROGRESS NOTES
CM met with patient and family at bedside to discuss DC planning and therapy recommendations for SNF. Daughter, Haroon Taylor in room agrees with SNF and chooses [facility] or [facility], CM sent referrals.

## 2021-07-27 NOTE — PROGRESS NOTES
Progress Note    Patient: Carrington Lim MRN: [id number]  SSN: [id number]    YOB: 1933  Age: 80 y.o.   Sex: female      Admit Date: 7/25/2021    LOS: 2 days     Subjective:   CT/ Us report reviewed  BP maintained by pressor   Past Medical History:   Diagnosis Date    Atrial fibrillation ([facility]     CAD (coronary artery disease)     Diabetes mellitus ([facility]     resolved    Endometrial cancer ([facility]     s/p TAHBSO, RT in 1990s    Hypertension     Skin cancer         Current Facility-Administered Medications:     NOREPINephrine (LEVOPHED) 8 mg in 5% dextrose 250mL (32 mcg/mL) infusion, 0.5-30 mcg/min, IntraVENous, TITRATE, Eliazar Carrasco MD, Last Rate: 18.8 mL/hr at 07/27/21 0351, 10 mcg/min at 07/27/21 0351    potassium chloride (K-DUR, KLOR-CON) SR tablet 40 mEq, 40 mEq, Oral, NOW, Anup Bower MD    promethazine (PHENERGAN) tablet 25 mg, 25 mg, Oral, Q4H PRN, Felicitas Mondragon MD, 25 mg at 07/27/21 0934    meropenem (MERREM) 1 g in sterile water (preservative free) 20 mL IV syringe, 1 g, IntraVENous, Q12H, Joe Washington MD    [START ON 7/28/2021] heparin (porcine) 1,000 unit/mL injection 5,000 Units, 5,000 Units, SubCUTAneous, Q12H, Macario Pereyra MD    0.9% sodium chloride infusion, 100 mL/hr, IntraVENous, CONTINUOUS, Macario Pereyra MD, Last Rate: 75 mL/hr at 07/26/21 1400, 75 mL/hr at 07/26/21 1400    amiodarone (CORDARONE) 375 mg in dextrose 5% 250 mL infusion, 1 mg/min, IntraVENous, CONTINUOUS, Rajesh Graff MD, Last Rate: 40 mL/hr at 07/27/21 0238, 1 mg/min at 07/27/21 0238    DOBUTamine (DOBUTREX) 500 mg/250 mL (2,000 mcg/mL) infusion, 0-10 mcg/kg/min, IntraVENous, TITRATE, Anup Bower MD, Last Rate: 25.6 mL/hr at 07/27/21 0840, 10 mcg/kg/min at 07/27/21 0840    morphine injection 2 mg, 2 mg, IntraVENous, Q6H PRN, Jj Hughes MD, 2 mg at 07/27/21 0630    sodium chloride (NS) flush 5-40 mL, 5-40 mL, IntraVENous, Q8H, Jess Rosales NP, 10 mL at 07/26/21 2124    sodium chloride (NS) flush 5-40 mL, 5-40 mL, IntraVENous, PRN, Jess Rosales NP  Eliecer Galla  acetaminophen (TYLENOL) tablet 650 mg, 650 mg, Oral, Q6H PRN **OR** acetaminophen (TYLENOL) suppository 650 mg, 650 mg, Rectal, Q6H PRN, Jess Rosales NP    polyethylene glycol (MIRALAX) packet 17 g, 17 g, Oral, DAILY PRN, Jess Rosales NP    bisacodyL (DULCOLAX) tablet 5 mg, 5 mg, Oral, DAILY PRN, Jess Rosales NP    ondansetron (ZOFRAN ODT) tablet 4 mg, 4 mg, Oral, Q6H PRN **OR** ondansetron (ZOFRAN) injection 4 mg, 4 mg, IntraVENous, Q6H PRN, Jess Rosales NP, 4 mg at 07/27/21 0840    famotidine (PEPCID) tablet 20 mg, 20 mg, Oral, BID, Jess Rosales NP, 20 mg at 07/26/21 6708    atorvastatin (LIPITOR) tablet 20 mg, 20 mg, Oral, DAILY, Jess Rosales NP, 20 mg at 07/26/21 0298    calcium-vitamin D 600 mg(1,500mg) -200 unit per tablet 1 Tablet, 1 Tablet, Oral, BID, Jess Rosales NP, 1 Tablet at 07/26/21 5960    cholecalciferol (VITAMIN D3) (1000 Units /25 mcg) tablet 2,000 Units, 2,000 Units, Oral, DAILY, Jess Rosales NP, 2,000 Units at 07/26/21 6771    [Held by provider] isosorbide mononitrate ER (IMDUR) tablet 30 mg, 30 mg, Oral, DAILY, Jess Rosales NP, 30 mg at 07/26/21 6059    levothyroxine (SYNTHROID) tablet 75 mcg, 75 mcg, Oral, ACB, Jess Rosales NP, 75 mcg at 07/26/21 0811    [Held by provider] metoprolol succinate (TOPROL-XL) XL tablet 50 mg, 50 mg, Oral, DAILY, Jess Rosales NP, 50 mg at 07/26/21 0809    [Held by provider] minoxidiL (LONITEN) tablet 5 mg, 5 mg, Oral, DAILY, Jess Rosales NP, 5 mg at 07/26/21 0812    Objective:     Vitals:    07/27/21 0900 07/27/21 1000 07/27/21 1100 07/27/21 1200   BP: (!) 103/51 (!) 113/47 (!) 117/53 (!) 115/51   Pulse: (!) 130 (!) 124 (!) 123 (!) 120   Resp: 26 23 21 21   Temp:       SpO2: 96% 97% 97% 98%   Weight:       Height:            Intake and Output:  Current Shift: No intake/output data recorded. Last three shifts: 07/25 1901 - 07/27 0700  In: 3279.2 [I.V.:3279.2]  Out: 1440 [Urine:1440]    Physical Exam:   Physical Exam  Constitutional:       Appearance: She is ill-appearing. HENT:      Head: Normocephalic. Mouth/Throat:      Mouth: Mucous membranes are moist.   Eyes:      Pupils: Pupils are equal, round, and reactive to light. Cardiovascular:      Rate and Rhythm: Tachycardia present. Pulses: Normal pulses. Pulmonary:      Effort: Pulmonary effort is normal.   Abdominal:      General: Abdomen is flat. Bowel sounds are normal.      Tenderness: There is no abdominal tenderness. Musculoskeletal:         General: No swelling. Skin:     General: Skin is warm. Coloration: Skin is pale.           Lab/Data Review:  Recent Results (from the past 24 hour(s))   PROCALCITONIN    Collection Time: 07/26/21  4:33 PM   Result Value Ref Range    Procalcitonin 1.11 (H) 0 ng/mL   URINALYSIS W/ REFLEX CULTURE    Collection Time: 07/26/21  8:15 PM    Specimen: Urine   Result Value Ref Range    Color Yellow      Appearance Turbid (A) Clear      Specific gravity 1.010 1.003 - 1.030      pH (UA) 5.0 5.0 - 8.0      Protein 30 (A) Negative mg/dL    Glucose Negative Negative mg/dL    Ketone Negative Negative mg/dL    Bilirubin Negative Negative      Blood Moderate (A) Negative      Urobilinogen 0.1 0.1 - 1.0 EU/dL    Nitrites Negative Negative      Leukocyte Esterase Large (A) Negative      WBC >100 (H) 0 - 4 /hpf    RBC 20-50 0 - 5 /hpf    Bacteria 1+ (A) Negative /hpf    UA:UC IF INDICATED Urine Culture Ordered (A) Culture not indicated by UA result      Mucus Trace (A) Negative /lpf   CBC WITH AUTOMATED DIFF    Collection Time: 07/26/21  8:15 PM   Result Value Ref Range    WBC 21.2 (H) 3.6 - 11.0 K/uL    RBC 3.44 (L) 3.80 - 5.20 M/uL    HGB 10.2 (L) 11.5 - 16.0 g/dL    HCT 30.5 (L) 35.0 - 47.0 %    MCV 88.7 80.0 - 99.0 FL    MCH 29.7 26.0 - 34.0 PG    MCHC 33.4 30.0 - 36.5 g/dL    RDW 15.8 (H) 11.5 - 14.5 %    PLATELET 841 867 - 542 K/uL    MPV 11.0 8.9 - 12.9 FL    NRBC 0.0 0.0  WBC    ABSOLUTE NRBC 0.00 0.00 - 0.01 K/uL    NEUTROPHILS 85 (H) 32 - 75 %    LYMPHOCYTES 9 (L) 12 - 49 %    MONOCYTES 6 5 - 13 %    EOSINOPHILS 0 0 - 7 %    BASOPHILS 0 0 - 1 %    IMMATURE GRANULOCYTES 0 %    ABS. NEUTROPHILS 18.0 (H) 1.8 - 8.0 K/UL    ABS. LYMPHOCYTES 1.9 0.8 - 3.5 K/UL    ABS. MONOCYTES 1.3 (H) 0.0 - 1.0 K/UL    ABS. EOSINOPHILS 0.0 0.0 - 0.4 K/UL    ABS. BASOPHILS 0.0 0.0 - 0.1 K/UL    ABS. IMM. GRANS. 0.0 K/UL    DF Manual      RBC COMMENTS Anisocytosis  1+       TYPE & SCREEN    Collection Time: 07/26/21  8:15 PM   Result Value Ref Range    Crossmatch Expiration 07/29/2021,2359     ABO/Rh(D) O Positive     Antibody screen Negative    METABOLIC PANEL, COMPREHENSIVE    Collection Time: 07/26/21  8:15 PM   Result Value Ref Range    Sodium 135 (L) 136 - 145 mmol/L    Potassium 3.1 (L) 3.5 - 5.1 mmol/L    Chloride 99 97 - 108 mmol/L    CO2 28 21 - 32 mmol/L    Anion gap 8 5 - 15 mmol/L    Glucose 128 (H) 65 - 100 mg/dL    BUN 36 (H) 6 - 20 mg/dL    Creatinine 1.93 (H) 0.55 - 1.02 mg/dL    BUN/Creatinine ratio 19 12 - 20      GFR est AA 30 (L) >60 ml/min/1.73m2    GFR est non-AA 25 (L) >60 ml/min/1.73m2    Calcium 8.0 (L) 8.5 - 10.1 mg/dL    Bilirubin, total 2.6 (H) 0.2 - 1.0 mg/dL    AST (SGOT) 38 (H) 15 - 37 U/L    ALT (SGPT) 35 12 - 78 U/L    Alk.  phosphatase 50 45 - 117 U/L    Protein, total 6.0 (L) 6.4 - 8.2 g/dL    Albumin 2.6 (L) 3.5 - 5.0 g/dL    Globulin 3.4 2.0 - 4.0 g/dL    A-G Ratio 0.8 (L) 1.1 - 2.2     BNP    Collection Time: 07/26/21  8:15 PM   Result Value Ref Range    NT pro-BNP 13,824 (H) <450 pg/mL   LACTIC ACID    Collection Time: 07/26/21  8:15 PM   Result Value Ref Range    Lactic acid 1.2 0.4 - 2.0 mmol/L   MRSA SCREEN - PCR (NASAL)    Collection Time: 07/26/21  8:15 PM   Result Value Ref Range    MRSA by PCR, Nasal Not Detected Not Detected     CALCIUM, UR, RANDOM    Collection Time: 07/26/21 10:15 PM   Result Value Ref Range    Calcium,urine random <5.0 mg/dL   CREATININE, UR, RANDOM    Collection Time: 07/26/21 10:15 PM   Result Value Ref Range    Creatinine, urine 80.00 mg/dL   MAGNESIUM, UR, RANDOM    Collection Time: 07/26/21 10:15 PM   Result Value Ref Range    Magnesium,urine random 2.6 mg/dL   POTASSIUM, UR, RANDOM    Collection Time: 07/26/21 10:15 PM   Result Value Ref Range    Potassium urine, random 41 mmol/L   SODIUM, UR, RANDOM    Collection Time: 07/26/21 10:15 PM   Result Value Ref Range    Sodium,urine random 68 mmol/L   UREA NITROGEN, UR, RANDOM    Collection Time: 07/26/21 10:15 PM   Result Value Ref Range    Urea Nitrogen,urine random 169 mg/dL   URIC ACID, UR, RANDOM    Collection Time: 07/26/21 10:15 PM   Result Value Ref Range    Uric Acid,urine random 12.3 mg/dL   CHLORIDE, URINE RANDOM    Collection Time: 07/26/21 10:15 PM   Result Value Ref Range    Chloride,urine random 89 mmol/L   PROTEIN URINE, RANDOM    Collection Time: 07/26/21 10:15 PM   Result Value Ref Range    Protein, urine random 52 (H) 0.0 - 11.9 mg/dL   CBC WITH AUTOMATED DIFF    Collection Time: 07/27/21  5:00 AM   Result Value Ref Range    WBC 17.2 (H) 3.6 - 11.0 K/uL    RBC 3.15 (L) 3.80 - 5.20 M/uL    HGB 9.3 (L) 11.5 - 16.0 g/dL    HCT 27.9 (L) 35.0 - 47.0 %    MCV 88.6 80.0 - 99.0 FL    MCH 29.5 26.0 - 34.0 PG    MCHC 33.3 30.0 - 36.5 g/dL    RDW 15.9 (H) 11.5 - 14.5 %    PLATELET 370 (L) 738 - 400 K/uL    MPV 10.5 8.9 - 12.9 FL    NRBC 0.0 0.0  WBC    ABSOLUTE NRBC 0.00 0.00 - 0.01 K/uL    NEUTROPHILS 92 (H) 32 - 75 %    LYMPHOCYTES 2 (L) 12 - 49 %    MONOCYTES 4 (L) 5 - 13 %    EOSINOPHILS 0 0 - 7 %    BASOPHILS 0 0 - 1 %    IMMATURE GRANULOCYTES 2 (H) 0 - 0.5 %    ABS. NEUTROPHILS 15.8 (H) 1.8 - 8.0 K/UL    ABS. LYMPHOCYTES 0.4 (L) 0.8 - 3.5 K/UL    ABS. MONOCYTES 0.7 0.0 - 1.0 K/UL    ABS. EOSINOPHILS 0.0 0.0 - 0.4 K/UL    ABS.  BASOPHILS 0.0 0.0 - 0.1 K/UL ABS. IMM. GRANS. 0.3 (H) 0.00 - 0.04 K/UL    DF AUTOMATED     METABOLIC PANEL, COMPREHENSIVE    Collection Time: 07/27/21  5:00 AM   Result Value Ref Range    Sodium 133 (L) 136 - 145 mmol/L    Potassium 3.5 3.5 - 5.1 mmol/L    Chloride 101 97 - 108 mmol/L    CO2 26 21 - 32 mmol/L    Anion gap 6 5 - 15 mmol/L    Glucose 195 (H) 65 - 100 mg/dL    BUN 41 (H) 6 - 20 mg/dL    Creatinine 1.81 (H) 0.55 - 1.02 mg/dL    BUN/Creatinine ratio 23 (H) 12 - 20      GFR est AA 32 (L) >60 ml/min/1.73m2    GFR est non-AA 26 (L) >60 ml/min/1.73m2    Calcium 7.4 (L) 8.5 - 10.1 mg/dL    Bilirubin, total 1.8 (H) 0.2 - 1.0 mg/dL    AST (SGOT) 31 15 - 37 U/L    ALT (SGPT) 31 12 - 78 U/L    Alk. phosphatase 50 45 - 117 U/L    Protein, total 5.5 (L) 6.4 - 8.2 g/dL    Albumin 2.3 (L) 3.5 - 5.0 g/dL    Globulin 3.2 2.0 - 4.0 g/dL    A-G Ratio 0.7 (L) 1.1 - 2.2     PROCALCITONIN    Collection Time: 07/27/21  5:00 AM   Result Value Ref Range    Procalcitonin 1.16 (H) 0 ng/mL   VANCOMYCIN, RANDOM    Collection Time: 07/27/21  5:00 AM   Result Value Ref Range    Vancomycin, random 14.7 ug/mL   C REACTIVE PROTEIN, QT    Collection Time: 07/27/21  5:00 AM   Result Value Ref Range    C-Reactive protein 29.60 (H) 0.00 - 0.60 mg/dL   MAGNESIUM    Collection Time: 07/27/21  5:00 AM   Result Value Ref Range    Magnesium 1.3 (L) 1.6 - 2.4 mg/dL        CT ABD PELV WO CONT   Final Result   Findings are most suggestive of cholecystitis given the stranding   and fluid centered in the region of the gallbladder. Duodenitis and pancreatitis   might be considered in the differential given the location. Nonobstructing right   nephrolithiasis, bilateral renal cysts. Findings in the lung bases suggestive of   CHF/pulmonary edema with bilateral effusions and compressive bilateral lower   lobe atelectasis. Three-vessel coronary arterial calcifications. Diffuse   aortoiliac arteriosclerotic calcifications.          XR CHEST PORT   Final Result      US RUQ   Final Result   Gallbladder sludge, without demonstrated stones      XR CHEST PORT   Final Result      XR CHEST PORT   Final Result   Central pulmonary vascular prominence likely accounting for the fullness in the   beatrice. There are also likely coarsened interstitial opacities that could   represent early interstitial edema or other interstitial process. No focal   airspace consolidation is evident.       XR CHEST PORT    (Results Pending)        Assessment:     Active Problems:    Unstable angina ([address] (7/25/2021)      Chest pain with moderate risk for cardiac etiology (7/25/2021)    epigastric abdominal pain   Cholecystitis,   Bilirubin is down  WBC is down     Plan:   ICU monitoring HR , on amiodarone iv drip   On H 2 blocker  Surgeon note noted  Sign off         Signed By: Richard Monroy MD     July 27, 2021        Thank you for allowing me to participate in this patients care  Cc Referring Physician   Lindy Grove NP

## 2021-07-28 ENCOUNTER — APPOINTMENT (OUTPATIENT)
Dept: GENERAL RADIOLOGY | Age: 86
DRG: 871 | End: 2021-07-28
Attending: INTERNAL MEDICINE
Payer: MEDICARE

## 2021-07-28 LAB
ALBUMIN SERPL-MCNC: 2 G/DL (ref 3.5–5)
ALBUMIN SERPL-MCNC: 2 G/DL (ref 3.5–5)
ALBUMIN/GLOB SERPL: 0.6 {RATIO} (ref 1.1–2.2)
ALP SERPL-CCNC: 93 U/L (ref 45–117)
ALT SERPL-CCNC: 30 U/L (ref 12–78)
ANION GAP SERPL CALC-SCNC: 7 MMOL/L (ref 5–15)
ANION GAP SERPL CALC-SCNC: 7 MMOL/L (ref 5–15)
AST SERPL W P-5'-P-CCNC: 26 U/L (ref 15–37)
BASOPHILS # BLD: 0 K/UL (ref 0–0.1)
BASOPHILS NFR BLD: 0 % (ref 0–1)
BILIRUB SERPL-MCNC: 1.4 MG/DL (ref 0.2–1)
BUN SERPL-MCNC: 32 MG/DL (ref 6–20)
BUN SERPL-MCNC: 34 MG/DL (ref 6–20)
BUN/CREAT SERPL: 25 (ref 12–20)
BUN/CREAT SERPL: 27 (ref 12–20)
CA-I BLD-MCNC: 7.1 MG/DL (ref 8.5–10.1)
CA-I BLD-MCNC: 7.3 MG/DL (ref 8.5–10.1)
CHLORIDE SERPL-SCNC: 100 MMOL/L (ref 97–108)
CHLORIDE SERPL-SCNC: 101 MMOL/L (ref 97–108)
CO2 SERPL-SCNC: 22 MMOL/L (ref 21–32)
CO2 SERPL-SCNC: 24 MMOL/L (ref 21–32)
CREAT SERPL-MCNC: 1.24 MG/DL (ref 0.55–1.02)
CREAT SERPL-MCNC: 1.28 MG/DL (ref 0.55–1.02)
CRP SERPL-MCNC: 27.8 MG/DL (ref 0–0.6)
DIFFERENTIAL METHOD BLD: ABNORMAL
EOSINOPHIL # BLD: 0 K/UL (ref 0–0.4)
EOSINOPHIL NFR BLD: 0 % (ref 0–7)
ERYTHROCYTE [DISTWIDTH] IN BLOOD BY AUTOMATED COUNT: 16.2 % (ref 11.5–14.5)
GLOBULIN SER CALC-MCNC: 3.2 G/DL (ref 2–4)
GLUCOSE SERPL-MCNC: 127 MG/DL (ref 65–100)
GLUCOSE SERPL-MCNC: 130 MG/DL (ref 65–100)
HCT VFR BLD AUTO: 26.2 % (ref 35–47)
HGB BLD-MCNC: 8.8 G/DL (ref 11.5–16)
IMM GRANULOCYTES # BLD AUTO: 0.2 K/UL (ref 0–0.04)
IMM GRANULOCYTES NFR BLD AUTO: 1 % (ref 0–0.5)
LACTATE SERPL-SCNC: 1.1 MMOL/L (ref 0.4–2)
LYMPHOCYTES # BLD: 0.4 K/UL (ref 0.8–3.5)
LYMPHOCYTES NFR BLD: 3 % (ref 12–49)
MAGNESIUM SERPL-MCNC: 2 MG/DL (ref 1.6–2.4)
MCH RBC QN AUTO: 29.5 PG (ref 26–34)
MCHC RBC AUTO-ENTMCNC: 33.6 G/DL (ref 30–36.5)
MCV RBC AUTO: 87.9 FL (ref 80–99)
MONOCYTES # BLD: 0.7 K/UL (ref 0–1)
MONOCYTES NFR BLD: 5 % (ref 5–13)
NEUTS SEG # BLD: 11.7 K/UL (ref 1.8–8)
NEUTS SEG NFR BLD: 91 % (ref 32–75)
NRBC # BLD: 0 K/UL (ref 0–0.01)
NRBC BLD-RTO: 0 PER 100 WBC
PHOSPHATE SERPL-MCNC: 2.3 MG/DL (ref 2.6–4.7)
PLATELET # BLD AUTO: 116 K/UL (ref 150–400)
PMV BLD AUTO: 10.9 FL (ref 8.9–12.9)
POTASSIUM SERPL-SCNC: 3.6 MMOL/L (ref 3.5–5.1)
POTASSIUM SERPL-SCNC: 3.6 MMOL/L (ref 3.5–5.1)
PROCALCITONIN SERPL-MCNC: 1.09 NG/ML
PROT SERPL-MCNC: 5.2 G/DL (ref 6.4–8.2)
RBC # BLD AUTO: 2.98 M/UL (ref 3.8–5.2)
SODIUM SERPL-SCNC: 130 MMOL/L (ref 136–145)
SODIUM SERPL-SCNC: 131 MMOL/L (ref 136–145)
WBC # BLD AUTO: 12.9 K/UL (ref 3.6–11)

## 2021-07-28 PROCEDURE — 74011000258 HC RX REV CODE- 258: Performed by: INTERNAL MEDICINE

## 2021-07-28 PROCEDURE — 74011250637 HC RX REV CODE- 250/637: Performed by: NURSE PRACTITIONER

## 2021-07-28 PROCEDURE — 74011250636 HC RX REV CODE- 250/636: Performed by: INTERNAL MEDICINE

## 2021-07-28 PROCEDURE — 85025 COMPLETE CBC W/AUTO DIFF WBC: CPT

## 2021-07-28 PROCEDURE — 86140 C-REACTIVE PROTEIN: CPT

## 2021-07-28 PROCEDURE — 80069 RENAL FUNCTION PANEL: CPT

## 2021-07-28 PROCEDURE — 77010033678 HC OXYGEN DAILY

## 2021-07-28 PROCEDURE — 80053 COMPREHEN METABOLIC PANEL: CPT

## 2021-07-28 PROCEDURE — 99232 SBSQ HOSP IP/OBS MODERATE 35: CPT | Performed by: COLON & RECTAL SURGERY

## 2021-07-28 PROCEDURE — 71045 X-RAY EXAM CHEST 1 VIEW: CPT

## 2021-07-28 PROCEDURE — 74011250637 HC RX REV CODE- 250/637: Performed by: INTERNAL MEDICINE

## 2021-07-28 PROCEDURE — 74011000250 HC RX REV CODE- 250: Performed by: INTERNAL MEDICINE

## 2021-07-28 PROCEDURE — 74011250637 HC RX REV CODE- 250/637: Performed by: COLON & RECTAL SURGERY

## 2021-07-28 PROCEDURE — 74011250636 HC RX REV CODE- 250/636: Performed by: NURSE PRACTITIONER

## 2021-07-28 PROCEDURE — 83735 ASSAY OF MAGNESIUM: CPT

## 2021-07-28 PROCEDURE — 84145 PROCALCITONIN (PCT): CPT

## 2021-07-28 PROCEDURE — 65610000006 HC RM INTENSIVE CARE

## 2021-07-28 PROCEDURE — 93005 ELECTROCARDIOGRAM TRACING: CPT

## 2021-07-28 PROCEDURE — 83605 ASSAY OF LACTIC ACID: CPT

## 2021-07-28 PROCEDURE — 36415 COLL VENOUS BLD VENIPUNCTURE: CPT

## 2021-07-28 RX ORDER — POTASSIUM CHLORIDE 20 MEQ/1
40 TABLET, EXTENDED RELEASE ORAL
Status: COMPLETED | OUTPATIENT
Start: 2021-07-28 | End: 2021-07-28

## 2021-07-28 RX ORDER — METOCLOPRAMIDE HYDROCHLORIDE 5 MG/ML
10 INJECTION INTRAMUSCULAR; INTRAVENOUS
Status: DISCONTINUED | OUTPATIENT
Start: 2021-07-28 | End: 2021-07-28

## 2021-07-28 RX ORDER — METOCLOPRAMIDE HYDROCHLORIDE 5 MG/ML
10 INJECTION INTRAMUSCULAR; INTRAVENOUS EVERY 6 HOURS
Status: DISCONTINUED | OUTPATIENT
Start: 2021-07-28 | End: 2021-07-30

## 2021-07-28 RX ADMIN — AMIODARONE HYDROCHLORIDE 0.5 MG/MIN: 50 INJECTION, SOLUTION INTRAVENOUS at 10:30

## 2021-07-28 RX ADMIN — ATORVASTATIN CALCIUM 20 MG: 20 TABLET, FILM COATED ORAL at 08:00

## 2021-07-28 RX ADMIN — Medication 10 ML: at 05:18

## 2021-07-28 RX ADMIN — PROMETHAZINE HYDROCHLORIDE 25 MG: 25 TABLET ORAL at 05:18

## 2021-07-28 RX ADMIN — Medication 1 TABLET: at 21:54

## 2021-07-28 RX ADMIN — Medication 1 TABLET: at 08:00

## 2021-07-28 RX ADMIN — SODIUM CHLORIDE 100 ML/HR: 9 INJECTION, SOLUTION INTRAVENOUS at 07:29

## 2021-07-28 RX ADMIN — MEROPENEM 1 G: 1 INJECTION INTRAVENOUS at 13:16

## 2021-07-28 RX ADMIN — HEPARIN SODIUM 5000 UNITS: 1000 INJECTION INTRAVENOUS; SUBCUTANEOUS at 08:00

## 2021-07-28 RX ADMIN — POTASSIUM CHLORIDE 40 MEQ: 1500 TABLET, EXTENDED RELEASE ORAL at 07:53

## 2021-07-28 RX ADMIN — HEPARIN SODIUM 5000 UNITS: 1000 INJECTION INTRAVENOUS; SUBCUTANEOUS at 21:54

## 2021-07-28 RX ADMIN — METOCLOPRAMIDE HYDROCHLORIDE 10 MG: 5 INJECTION INTRAMUSCULAR; INTRAVENOUS at 17:01

## 2021-07-28 RX ADMIN — ONDANSETRON 4 MG: 2 INJECTION INTRAMUSCULAR; INTRAVENOUS at 04:36

## 2021-07-28 RX ADMIN — ACETAMINOPHEN 650 MG: 325 TABLET ORAL at 19:57

## 2021-07-28 RX ADMIN — LEVOTHYROXINE SODIUM 75 MCG: 75 TABLET ORAL at 07:53

## 2021-07-28 RX ADMIN — FAMOTIDINE 20 MG: 20 TABLET ORAL at 08:00

## 2021-07-28 RX ADMIN — Medication 10 ML: at 22:00

## 2021-07-28 RX ADMIN — ACETAMINOPHEN 650 MG: 325 TABLET ORAL at 09:50

## 2021-07-28 RX ADMIN — MEROPENEM 1 G: 1 INJECTION INTRAVENOUS at 03:36

## 2021-07-28 RX ADMIN — Medication 10 ML: at 13:16

## 2021-07-28 RX ADMIN — FAMOTIDINE 20 MG: 20 TABLET ORAL at 21:54

## 2021-07-28 RX ADMIN — METOCLOPRAMIDE HYDROCHLORIDE 10 MG: 5 INJECTION INTRAMUSCULAR; INTRAVENOUS at 12:27

## 2021-07-28 RX ADMIN — DOBUTAMINE IN DEXTROSE 5 MCG/KG/MIN: 200 INJECTION, SOLUTION INTRAVENOUS at 09:11

## 2021-07-28 RX ADMIN — Medication 2000 UNITS: at 08:00

## 2021-07-28 RX ADMIN — METOCLOPRAMIDE HYDROCHLORIDE 10 MG: 5 INJECTION INTRAMUSCULAR; INTRAVENOUS at 08:00

## 2021-07-28 NOTE — PROGRESS NOTES
Nephrology Consult    Patient: Jaiden Tirado MRN: [id number]  SSN: [id number]    YOB: 1933  Age: 80 y.o.   Sex: female      Subjective:   Pt seen in ICU  Cr 1.3  Off IVF  On pressors  On amiodarone     Past Medical History:   Diagnosis Date    [facility]     CAD (coronary artery disease)     Diabetes mellitus ([facility]     resolved    Endometrial cancer ([facility]     s/p TAHBSO, RT in 1990s    Hypertension     Skin cancer      Past Surgical History:   Procedure Laterality Date    HX HYSTERECTOMY      IR CHOLECYSTOSTOMY PERCUTANEOUS  7/27/2021      Family History   Problem Relation Age of Onset    Hypertension Father     Cancer Father     Hypertension Brother     Ovarian Cancer Other         daughter, unsure if genetic testing performed     Social History     Tobacco Use    Smoking status: Former Smoker    Smokeless tobacco: Never Used    Tobacco comment: quit >50 yrs ago   Substance Use Topics    Alcohol use: Not on file      Current Facility-Administered Medications   Medication Dose Route Frequency Provider Last Rate Last Admin    metoclopramide HCl (REGLAN) injection 10 mg  10 mg IntraVENous Q6H Anup Bower MD   10 mg at 07/28/21 1227    NOREPINephrine (LEVOPHED) 8 mg in 5% dextrose 250mL (32 mcg/mL) infusion  0.5-30 mcg/min IntraVENous TITRATE Francisco Bower MD   Stopped at 07/28/21 0500    promethazine (PHENERGAN) tablet 25 mg  25 mg Oral Q4H PRN Teagan Gonzalez MD   25 mg at 07/28/21 0518    meropenem (MERREM) 1 g in sterile water (preservative free) 20 mL IV syringe  1 g IntraVENous Q12H Claudia Marte MD   1 g at 07/28/21 0336    heparin (porcine) 1,000 unit/mL injection 5,000 Units  5,000 Units SubCUTAneous Q12H Sarah Banks MD   5,000 Units at 07/28/21 0800    amiodarone (CORDARONE) 375 mg in dextrose 5% 250 mL infusion  1 mg/min IntraVENous CONTINUOUS Cari KAUR MD 20 mL/hr at 07/28/21 1030 0.5 mg/min at 07/28/21 1030    DOBUTamine (DOBUTREX) 500 mg/250 mL (2,000 mcg/mL) infusion  0-10 mcg/kg/min IntraVENous TITRATE Keo Bray MD 9 mL/hr at 07/28/21 0945 3.5 mcg/kg/min at 07/28/21 0945    sodium chloride (NS) flush 5-40 mL  5-40 mL IntraVENous Q8H [facility], NP   10 mL at 07/28/21 0518    sodium chloride (NS) flush 5-40 mL  5-40 mL IntraVENous PRN [facility], NP        acetaminophen (TYLENOL) tablet 650 mg  650 mg Oral Q6H PRN [facility], NP   650 mg at 07/28/21 8184    Or    acetaminophen (TYLENOL) suppository 650 mg  650 mg Rectal Q6H PRN [facility], NP        polyethylene glycol (MIRALAX) packet 17 g  17 g Oral DAILY PRN [facility], NP        bisacodyL (DULCOLAX) tablet 5 mg  5 mg Oral DAILY PRN [facility], NP        famotidine (PEPCID) tablet 20 mg  20 mg Oral BID [facility], NP   20 mg at 07/28/21 0800    atorvastatin (LIPITOR) tablet 20 mg  20 mg Oral DAILY [facility], NP   20 mg at 07/28/21 0800    calcium-vitamin D 600 mg(1,500mg) -200 unit per tablet 1 Tablet  1 Tablet Oral BID [facility], NP   1 Tablet at 07/28/21 0800    cholecalciferol (VITAMIN D3) (1000 Units /25 mcg) tablet 2,000 Units  2,000 Units Oral DAILY [facility], NP   2,000 Units at 07/28/21 0800    [Held by provider] isosorbide mononitrate ER (IMDUR) tablet 30 mg  30 mg Oral DAILY [facility], NP   30 mg at 07/26/21 6970    levothyroxine (SYNTHROID) tablet 75 mcg  75 mcg Oral ACB [facility], NP   75 mcg at 07/28/21 0753    [Held by provider] metoprolol succinate (TOPROL-XL) XL tablet 50 mg  50 mg Oral DAILY [facility], NP   50 mg at 07/26/21 0809    [Held by provider] minoxidiL (LONITEN) tablet 5 mg  5 mg Oral DAILY [facility], NP   5 mg at 07/26/21 9233        Allergies   Allergen Reactions    Penicillins Unknown (comments)       Review of Systems:  A comprehensive review of systems was negative except for that written in the History of Present Illness. Objective:     Vitals:    07/28/21 1000 07/28/21 1100 07/28/21 1200 07/28/21 1205   BP: 98/63 (!) 96/55 (!) 102/46    Pulse: 95 95 91    Resp: 20 20 30    Temp:    99.1 °F (37.3 °C)   SpO2: 99% 99% 98%    Weight:       Height:            Physical Exam:  General: NAD  Eyes: sclera anicteric  Oral Cavity: No thrush or ulcers  Neck: no JVD  Chest: Fair bilateral air entry  Heart: normal sounds  Abdomen: soft and  tender +  :  [place]+  Lower Extremities: no edema  Skin: no rash  Neuro: intact  Psychiatric: non-depressed            Assessment:     Hospital Problems  Never Reviewed        Codes Class Noted POA    Unstable angina ([facility] ICD-10-CM: I20.0  ICD-9-CM: 411.1  7/25/2021 Unknown        Chest pain with moderate risk for cardiac etiology ICD-10-CM: R07.9  ICD-9-CM: 786.50  7/25/2021 Unknown              Plan:   1 acute kidney injury:  -2/2 prerenal azotemia from sepsis/hypotension plus on diuretics and lisinopril.  -On admission creatinine was 0.9 and has bumped to 1.8 in the last 24 hours.   -Cr has improved to 1.3   -Clinically no evidence of volume overload. Chest x-ray is clear. -2D echocardiogram showed preserved LVEF. -Agree to hold lisinopril diuretics and nitrates. -off  IV fluids.    -No evidence of hydronephrosis per CT of the abdomen. 2.  Hypokalemia.    -From low p.o. intake/hypomagnesemia. -K was 3.2 and magnesium 1.3.    -replaced with K and IV magnesium sulfate.  -K 3.6, Mg 2.0    3. Septic shock:    -Admitted with right upper quadrant pain, WBC 21K, hypotensive. -CT abdomen showed distended gallbladder and fluid around diagnosed with acute cholecystitis General surgery planning to do cholecystotomy.    -On IV antibiotics. On pressor. 4. anemia.    -Hemoglobin 8.8.   - I will send iron studies and ferritin level    5. A. fib. With RVR. on IV amiodarone drip.     6. DVT prophylaxis:  -changed lovenox to unfractionated sq hep      Signed By: Rudolph Harris MD July 28, 2021

## 2021-07-28 NOTE — PROGRESS NOTES
Four eyes skin assessment completed with Harriet Manuel RN. Bilateral bruising on arms. No open areas noted. Pillows and repositioning as a preventative measure.

## 2021-07-28 NOTE — PROGRESS NOTES
CARDIOLOGY PROGRESS NOTE     Patient seen and examined. This is a patient who is followed for chest pain. Patient has a history of hypertension, coronary artery disease, and atrial fibrillation. Patient is more awake and coherent this am. She is answering question appropriately. Denies chest pain. Endorses abdominal pain/discomfort. She remains on pressor support, dobutamine, and amiodarone. Patient is lying in bed with eyes closed. She looks ill. Family at the bedside. No other complaints reported. Telemetry reviewed, there were no events noted in the past 24 hours. Sinus tachycardia; heart rate; 115. Pertinent review of systems items noted above, all other systems are negative. Current medications reviewed. Physical Examination  Vital signs are stable. Blood pressure 84/49 , Pulse 115  No apparent distress. Heart is regular, rate and rhythm. Normal S1, S2, no murmurs are appreciated. Lungs are clear bilaterally. Abdomen is soft, nontender, normal bowel sounds. Extremities have no edema. Labs reviewed. 7/28/21  WBC 12.9, trending down  Hgb 8.8  Mg 2.6    7/27/21 - 2- D echo:   LV: Calculated LVEF is 65%. Normal cavity size, wall thickness, systolic function (ejection fraction normal) and diastolic function. Wall motion: normal. Normal left ventricular strain. LA: Dilated left atrium. TV: Mild to moderate tricuspid valve regurgitation is present. Diastolic function if indeterminate given presence of atrial fibrillation. Given age and dilated left atrium, likely has some degree of diastolic dysfunction. Case discussed with Dr. Gabriel Gonzalez and our impression and recommendations are as follows:    1. Chest pain: No active chest pain, lower abdomen discomfort, CT abdomen positive for cholecystistis. Troponins are negative x 4. Serial EKGs are nonischemic. ACS ruled out. Echocardiogram with preserved EF and no wall motion abnormalities. Continue telemetry monitoring.  Isosorbide on hold due to low BP. Continue dobutamine. 2. Atrial fibrillation: Continue amio gtt will transition to PO. Her CHADS2-VASc score is 4; continue coumadin. Continue telemetry monitoring. Keep serum potassium between 4-5 and serum magnesium > 2. Will replete magnesium. 3. Sinus tachycardia: compensatory due to sepsis, cholecystitis, on multiple pressors including Norepinenephrine and Dobutamine. Hold all beta blockers    4. Hypotension: continue levophed and titrate as tolerated. 5. Acute cholecystis: surgery on the case. Given tachycardia and negative trops x4, with normal TTE, no further risk stratification needed prior to procedure/surgery. Please do not hesitate to call me or Dr. Gab Anderson if additional questions arise.     Agree with above, seen with Mayuri Cassidy MD, Jimmie Frazier, [address]  Structural Heart Disease  Endovascular and Vascular Medicine  Interventional Cardiology  [facility]  [phone]

## 2021-07-28 NOTE — PROGRESS NOTES
Hospitalist Progress Note    Subjective:   Daily Progress Note: 7/28/2021 8:49 AM    Hospital Course:  Kymberly Hooker is a 80 y.o. female with a  past medical history of HTN, CAD, and atrial fibrillation, who presented to the ED with sharp, substernal chest pain and associated nausea and found to be in atrial fibrillation with RVR. Louann Viveros Patient additionally reported experiencing vaginal bleeding while on Eliquis and now on warfarin, she has a gynecology appointment scheduled for evaluation. Given ASA, pain medication and nitroglycerin with relief. Patient has leukocytosis of unclear etiology with an increasing white blood cell count since admission. Tenderness in the right upper quadrant as well as left upper quadrant. CT scan of the abdomen pelvis pending. Right upper quadrant ultrasound pending. Prophylactically will treat with Rocephin. Urine culture and blood culture pending. INR subtherapeutic at 1.4, BNP 4475. x4 Troponins negative. EKG showed afib. CXR showed central pulmonary vascular prominence and coarse interstitial opacities. Chest pain resolved. Cardiology consulted.  Echo revealed LVEF 65%, dilated left atrium, overnight patient became increasingly hypotensive, was transferred to the ICU, central line placed, obtained CT abdomen pelvis showing biliary sludge, urinalysis consistent with urinary tract infection, patient now s/p cholecystostomy tube, doing well on Meropenem    Subjective:   Patient seen and evaluated at bedside, of note patient had nausea overnight, abdominal pain has improved after cholecystostomy tube placement, discussed with RN    Current Facility-Administered Medications   Medication Dose Route Frequency    potassium chloride (K-DUR, KLOR-CON) SR tablet 40 mEq  40 mEq Oral NOW    metoclopramide HCl (REGLAN) injection 10 mg  10 mg IntraVENous Q6H PRN    NOREPINephrine (LEVOPHED) 8 mg in 5% dextrose 250mL (32 mcg/mL) infusion  0.5-30 mcg/min IntraVENous TITRATE    promethazine (PHENERGAN) tablet 25 mg  25 mg Oral Q4H PRN    meropenem (MERREM) 1 g in sterile water (preservative free) 20 mL IV syringe  1 g IntraVENous Q12H    heparin (porcine) 1,000 unit/mL injection 5,000 Units  5,000 Units SubCUTAneous Q12H    0.9% sodium chloride infusion  100 mL/hr IntraVENous CONTINUOUS    amiodarone (CORDARONE) 375 mg in dextrose 5% 250 mL infusion  1 mg/min IntraVENous CONTINUOUS    DOBUTamine (DOBUTREX) 500 mg/250 mL (2,000 mcg/mL) infusion  0-10 mcg/kg/min IntraVENous TITRATE    morphine injection 2 mg  2 mg IntraVENous Q6H PRN    sodium chloride (NS) flush 5-40 mL  5-40 mL IntraVENous Q8H    sodium chloride (NS) flush 5-40 mL  5-40 mL IntraVENous PRN    acetaminophen (TYLENOL) tablet 650 mg  650 mg Oral Q6H PRN    Or    acetaminophen (TYLENOL) suppository 650 mg  650 mg Rectal Q6H PRN    polyethylene glycol (MIRALAX) packet 17 g  17 g Oral DAILY PRN    bisacodyL (DULCOLAX) tablet 5 mg  5 mg Oral DAILY PRN    famotidine (PEPCID) tablet 20 mg  20 mg Oral BID    atorvastatin (LIPITOR) tablet 20 mg  20 mg Oral DAILY    calcium-vitamin D 600 mg(1,500mg) -200 unit per tablet 1 Tablet  1 Tablet Oral BID    cholecalciferol (VITAMIN D3) (1000 Units /25 mcg) tablet 2,000 Units  2,000 Units Oral DAILY    [Held by provider] isosorbide mononitrate ER (IMDUR) tablet 30 mg  30 mg Oral DAILY    levothyroxine (SYNTHROID) tablet 75 mcg  75 mcg Oral ACB    [Held by provider] metoprolol succinate (TOPROL-XL) XL tablet 50 mg  50 mg Oral DAILY    [Held by provider] minoxidiL (LONITEN) tablet 5 mg  5 mg Oral DAILY        Review of Systems  Review of Systems   Constitutional: Negative for chills, fever and weight loss. HENT: Negative. Eyes: Negative. Respiratory: Negative for cough, sputum production, shortness of breath and wheezing. Cardiovascular: Negative for chest pain, palpitations, orthopnea and leg swelling.    Gastrointestinal: Positive for abdominal pain (minimal, epigastric and RUQ) and nausea. Negative for constipation, diarrhea and vomiting. Genitourinary: Negative for dysuria, frequency and urgency. Musculoskeletal: Negative. Neurological: Negative. Objective:     Visit Vitals  BP (!) 91/56 (BP 1 Location: Right upper arm, BP Patient Position: At rest)   Pulse (!) 116   Temp 98.2 °F (36.8 °C)   Resp 24   Ht 5' 4\" (1.626 m)   Wt 85.3 kg (188 lb)   SpO2 98%   Breastfeeding No   BMI 32.27 kg/m²    O2 Flow Rate (L/min): 2 l/min O2 Device: Nasal cannula    Temp (24hrs), Av.8 °F (37.1 °C), Min:98.2 °F (36.8 °C), Max:99.2 °F (37.3 °C)      No intake/output data recorded.  1901 -  0700  In: 5656.2 [I.V.:5656.2]  Out: 200 [Urine:940; Drains:300]    Recent Results (from the past 24 hour(s))   CBC WITH AUTOMATED DIFF    Collection Time: 21  5:35 AM   Result Value Ref Range    WBC 12.9 (H) 3.6 - 11.0 K/uL    RBC 2.98 (L) 3.80 - 5.20 M/uL    HGB 8.8 (L) 11.5 - 16.0 g/dL    HCT 26.2 (L) 35.0 - 47.0 %    MCV 87.9 80.0 - 99.0 FL    MCH 29.5 26.0 - 34.0 PG    MCHC 33.6 30.0 - 36.5 g/dL    RDW 16.2 (H) 11.5 - 14.5 %    PLATELET 455 (L) 180 - 400 K/uL    MPV 10.9 8.9 - 12.9 FL    NRBC 0.0 0.0  WBC    ABSOLUTE NRBC 0.00 0.00 - 0.01 K/uL    NEUTROPHILS 91 (H) 32 - 75 %    LYMPHOCYTES 3 (L) 12 - 49 %    MONOCYTES 5 5 - 13 %    EOSINOPHILS 0 0 - 7 %    BASOPHILS 0 0 - 1 %    IMMATURE GRANULOCYTES 1 (H) 0 - 0.5 %    ABS. NEUTROPHILS 11.7 (H) 1.8 - 8.0 K/UL    ABS. LYMPHOCYTES 0.4 (L) 0.8 - 3.5 K/UL    ABS. MONOCYTES 0.7 0.0 - 1.0 K/UL    ABS. EOSINOPHILS 0.0 0.0 - 0.4 K/UL    ABS. BASOPHILS 0.0 0.0 - 0.1 K/UL    ABS. IMM.  GRANS. 0.2 (H) 0.00 - 0.04 K/UL    DF AUTOMATED     MAGNESIUM    Collection Time: 21  5:35 AM   Result Value Ref Range    Magnesium 2.0 1.6 - 2.4 mg/dL   C REACTIVE PROTEIN, QT    Collection Time: 21  5:35 AM   Result Value Ref Range    C-Reactive protein 27.80 (H) 0.00 - 0.60 mg/dL   PROCALCITONIN    Collection Time: 21  5:35 AM Result Value Ref Range    Procalcitonin 1.09 (H) 0 ng/mL   LACTIC ACID    Collection Time: 07/28/21  5:35 AM   Result Value Ref Range    Lactic acid 1.1 0.4 - 2.0 mmol/L   RENAL FUNCTION PANEL    Collection Time: 07/28/21  5:35 AM   Result Value Ref Range    Sodium 131 (L) 136 - 145 mmol/L    Potassium 3.6 3.5 - 5.1 mmol/L    Chloride 100 97 - 108 mmol/L    CO2 24 21 - 32 mmol/L    Anion gap 7 5 - 15 mmol/L    Glucose 127 (H) 65 - 100 mg/dL    BUN 34 (H) 6 - 20 mg/dL    Creatinine 1.24 (H) 0.55 - 1.02 mg/dL    BUN/Creatinine ratio 27 (H) 12 - 20      GFR est AA 49 (L) >60 ml/min/1.73m2    GFR est non-AA 41 (L) >60 ml/min/1.73m2    Calcium 7.1 (L) 8.5 - 10.1 mg/dL    Phosphorus 2.3 (L) 2.6 - 4.7 mg/dL    Albumin 2.0 (L) 3.5 - 5.0 g/dL        XR CHEST PORT   Final Result      IR CHOLECYSTOSTOMY PERCUTANEOUS   Final Result   Successful ultrasound and fluoroscopic guided aspiration and   percutaneous cholecystostomy tube placement. CT ABD PELV WO CONT   Final Result   Findings are most suggestive of cholecystitis given the stranding   and fluid centered in the region of the gallbladder. Duodenitis and pancreatitis   might be considered in the differential given the location. Nonobstructing right   nephrolithiasis, bilateral renal cysts. Findings in the lung bases suggestive of   CHF/pulmonary edema with bilateral effusions and compressive bilateral lower   lobe atelectasis. Three-vessel coronary arterial calcifications. Diffuse   aortoiliac arteriosclerotic calcifications. XR CHEST PORT   Final Result      US RUQ   Final Result   Gallbladder sludge, without demonstrated stones      XR CHEST PORT   Final Result      XR CHEST PORT   Final Result   Central pulmonary vascular prominence likely accounting for the fullness in the   beatrice. There are also likely coarsened interstitial opacities that could   represent early interstitial edema or other interstitial process.  No focal   airspace consolidation is evident. XR CHEST PORT    (Results Pending)        PHYSICAL EXAM:    Physical Exam  Vitals reviewed. Constitutional:       General: She is not in acute distress. Appearance: She is not ill-appearing or diaphoretic. HENT:      Head: Normocephalic and atraumatic. Mouth/Throat:      Mouth: Mucous membranes are moist.      Pharynx: Oropharynx is clear. Eyes:      Conjunctiva/sclera: Conjunctivae normal.      Pupils: Pupils are equal, round, and reactive to light. Cardiovascular:      Rate and Rhythm: Normal rate and regular rhythm. Pulses: Normal pulses. Heart sounds: Normal heart sounds. Pulmonary:      Effort: Pulmonary effort is normal.      Breath sounds: Normal breath sounds. No wheezing, rhonchi or rales. Chest:      Chest wall: No tenderness. Abdominal:      General: Abdomen is flat. There is no distension. Palpations: Abdomen is soft. There is no mass. Tenderness: There is abdominal tenderness (epigastric, moderate to palpation). Musculoskeletal:         General: No tenderness or deformity. Normal range of motion. Skin:     General: Skin is warm. Neurological:      General: No focal deficit present. Mental Status: She is alert and oriented to person, place, and time.        Data Review    Recent Results (from the past 24 hour(s))   CBC WITH AUTOMATED DIFF    Collection Time: 07/28/21  5:35 AM   Result Value Ref Range    WBC 12.9 (H) 3.6 - 11.0 K/uL    RBC 2.98 (L) 3.80 - 5.20 M/uL    HGB 8.8 (L) 11.5 - 16.0 g/dL    HCT 26.2 (L) 35.0 - 47.0 %    MCV 87.9 80.0 - 99.0 FL    MCH 29.5 26.0 - 34.0 PG    MCHC 33.6 30.0 - 36.5 g/dL    RDW 16.2 (H) 11.5 - 14.5 %    PLATELET 388 (L) 809 - 400 K/uL    MPV 10.9 8.9 - 12.9 FL    NRBC 0.0 0.0  WBC    ABSOLUTE NRBC 0.00 0.00 - 0.01 K/uL    NEUTROPHILS 91 (H) 32 - 75 %    LYMPHOCYTES 3 (L) 12 - 49 %    MONOCYTES 5 5 - 13 %    EOSINOPHILS 0 0 - 7 %    BASOPHILS 0 0 - 1 %    IMMATURE GRANULOCYTES 1 (H) 0 - 0.5 % ABS. NEUTROPHILS 11.7 (H) 1.8 - 8.0 K/UL    ABS. LYMPHOCYTES 0.4 (L) 0.8 - 3.5 K/UL    ABS. MONOCYTES 0.7 0.0 - 1.0 K/UL    ABS. EOSINOPHILS 0.0 0.0 - 0.4 K/UL    ABS. BASOPHILS 0.0 0.0 - 0.1 K/UL    ABS. IMM. GRANS. 0.2 (H) 0.00 - 0.04 K/UL    DF AUTOMATED     MAGNESIUM    Collection Time: 07/28/21  5:35 AM   Result Value Ref Range    Magnesium 2.0 1.6 - 2.4 mg/dL   C REACTIVE PROTEIN, QT    Collection Time: 07/28/21  5:35 AM   Result Value Ref Range    C-Reactive protein 27.80 (H) 0.00 - 0.60 mg/dL   PROCALCITONIN    Collection Time: 07/28/21  5:35 AM   Result Value Ref Range    Procalcitonin 1.09 (H) 0 ng/mL   LACTIC ACID    Collection Time: 07/28/21  5:35 AM   Result Value Ref Range    Lactic acid 1.1 0.4 - 2.0 mmol/L   RENAL FUNCTION PANEL    Collection Time: 07/28/21  5:35 AM   Result Value Ref Range    Sodium 131 (L) 136 - 145 mmol/L    Potassium 3.6 3.5 - 5.1 mmol/L    Chloride 100 97 - 108 mmol/L    CO2 24 21 - 32 mmol/L    Anion gap 7 5 - 15 mmol/L    Glucose 127 (H) 65 - 100 mg/dL    BUN 34 (H) 6 - 20 mg/dL    Creatinine 1.24 (H) 0.55 - 1.02 mg/dL    BUN/Creatinine ratio 27 (H) 12 - 20      GFR est AA 49 (L) >60 ml/min/1.73m2    GFR est non-AA 41 (L) >60 ml/min/1.73m2    Calcium 7.1 (L) 8.5 - 10.1 mg/dL    Phosphorus 2.3 (L) 2.6 - 4.7 mg/dL    Albumin 2.0 (L) 3.5 - 5.0 g/dL        Assessment/Plan:     Active Problems:    Unstable angina (HCC) (7/25/2021)      Chest pain with moderate risk for cardiac etiology (7/25/2021)      Impression:     1. Chest pain r/o ACS  2. CHF  3. A-fib  4. CAD  5. Hypertension  6. Hyperlipidemia  7. Hypothyroidism  8. Leukocytosis  9. Hypokalemia  10.  Vaginal bleeding     Plan:    Septic shock secondary to cholecystitisof note patient found to be in septic shock yesterday not responding to fluid resuscitation, currently on pressors based on patient's clinical presentation multifactorial including secondary to cholecystitis based on CT abdomen pelvis results versus urinary tract infection, currently s/p cholecystostomy tube placement, doing well  Follow-up blood cultures  Continue pressor support, wean off later today  Continue Meropenem for antibiotic coverage  Obtain general surgery consult further evaluation    Urinary tract infectionpatient presents with septic shock with likely component of urinary tract infection given urinalysis  Follow blood cultures  Continue pressor support, wean off later today  Follow-up urine culture  Continue Meropenem for antibiotic coverage    Acute decompensated heart failure with preserved ejection fractionof note patient found to be in acute decompensated heart failure with preserved ejection fraction with significant bilateral pleural effusions, currently doing well on dobutamine gtt.   Frequent intake and output monitoring  Daily weights  Continue telemetry monitoring  Continue dobutamine gtt, attempt to wean  Discontinue IV fluids  Follow-up cardiology recommendations    Hypomagnesemiaresolved    Hypokalemiareplete potassium and recheck potassium    Acute kidney injurycurrently serum creatinine downtrending, differentials include prerenal versus renal versus postrenal etiologies  Discontinue IV fluids in the setting of volume overload  Avoid Nephrotoxic medications  Continue to trend serum creatinine  Nephrology consult appreciated, continue to follow recommendations    Atrial fibrillation with RVRof note patient found to be in atrial fibrillation with RVR upon presentation, currently remains hemodynamically stable at this time  Continue telemetry monitoring  Continue amiodarone GTT  Hold anticoagulation in the setting of patient possibly needing procedure  Follow cardiology recommendations    Hypertensionholding home antihypertensive medications    Vaginal bleedingof note patient found to have vaginal bleeding, status post total abdominal hysterectomy due to endometrial cancer  Maintain active type and screen  Continue to trend hemoglobin and hematocrit  Follow gynecology recommendations    ProphylaxisSCDs  FENClear liquid diet, replete potassium and magnesium  DNR, patient surrogate decision-maker and power of  is karly Sales.   Disposition - Continued ICU care pending clinical improvement    Critical care time spent 45 minutes involving direct patient care as well as reviewing patient's labs and coordination of care with nursing staff

## 2021-07-28 NOTE — PROGRESS NOTES
DC plan at this time is SNF. Raegan Hester (daughter) chose [facility] or [facility]. CM sent updates to both SNFs, still waiting on acceptance.

## 2021-07-28 NOTE — PROGRESS NOTES
Progress Note    Patient: Luis Parada MRN: [id number]  SSN: [id number]    YOB: 1933  Age: 80 y.o. Sex: female      Admit Date: 7/25/2021    LOS: 3 days     Subjective:   Post procedure day 1 status post percutaneous cholecystostomy tube placement  Patient seen in bed  Reports abdominal pain is improved  Continues to report nausea, no emesis  Continues to be tachycardic is on amiodarone drip for rate control    Objective:     Vitals:    07/28/21 0700 07/28/21 0715 07/28/21 0752 07/28/21 0759   BP:    (!) 102/43   Pulse:   (!) 115 (!) 112   Resp:       Temp: 97.8 °F (36.6 °C)      SpO2:  98%     Weight:       Height:            Intake and Output:  Current Shift: No intake/output data recorded. Last three shifts: 07/26 1901 - 07/28 0700  In: 6430.6 [P.O.:120; I.V.:6310.6]  Out: 1890 [LTVHM:6139; Drains:300]    Review of Systems:  ROS     Physical Exam:   Physical Exam  Abdominal:      Comments: Abdomen is soft, nondistended, very minimally tender in the right upper quadrant, cholecystostomy tube in position draining bile          Lab/Data Review:  Recent Results (from the past 24 hour(s))   CBC WITH AUTOMATED DIFF    Collection Time: 07/28/21  5:35 AM   Result Value Ref Range    WBC 12.9 (H) 3.6 - 11.0 K/uL    RBC 2.98 (L) 3.80 - 5.20 M/uL    HGB 8.8 (L) 11.5 - 16.0 g/dL    HCT 26.2 (L) 35.0 - 47.0 %    MCV 87.9 80.0 - 99.0 FL    MCH 29.5 26.0 - 34.0 PG    MCHC 33.6 30.0 - 36.5 g/dL    RDW 16.2 (H) 11.5 - 14.5 %    PLATELET 440 (L) 051 - 400 K/uL    MPV 10.9 8.9 - 12.9 FL    NRBC 0.0 0.0  WBC    ABSOLUTE NRBC 0.00 0.00 - 0.01 K/uL    NEUTROPHILS 91 (H) 32 - 75 %    LYMPHOCYTES 3 (L) 12 - 49 %    MONOCYTES 5 5 - 13 %    EOSINOPHILS 0 0 - 7 %    BASOPHILS 0 0 - 1 %    IMMATURE GRANULOCYTES 1 (H) 0 - 0.5 %    ABS. NEUTROPHILS 11.7 (H) 1.8 - 8.0 K/UL    ABS. LYMPHOCYTES 0.4 (L) 0.8 - 3.5 K/UL    ABS. MONOCYTES 0.7 0.0 - 1.0 K/UL    ABS. EOSINOPHILS 0.0 0.0 - 0.4 K/UL    ABS.  BASOPHILS 0.0 0.0 - 0.1 K/UL    ABS. IMM. GRANS. 0.2 (H) 0.00 - 0.04 K/UL    DF AUTOMATED     MAGNESIUM    Collection Time: 07/28/21  5:35 AM   Result Value Ref Range    Magnesium 2.0 1.6 - 2.4 mg/dL   C REACTIVE PROTEIN, QT    Collection Time: 07/28/21  5:35 AM   Result Value Ref Range    C-Reactive protein 27.80 (H) 0.00 - 0.60 mg/dL   PROCALCITONIN    Collection Time: 07/28/21  5:35 AM   Result Value Ref Range    Procalcitonin 1.09 (H) 0 ng/mL   LACTIC ACID    Collection Time: 07/28/21  5:35 AM   Result Value Ref Range    Lactic acid 1.1 0.4 - 2.0 mmol/L   RENAL FUNCTION PANEL    Collection Time: 07/28/21  5:35 AM   Result Value Ref Range    Sodium 131 (L) 136 - 145 mmol/L    Potassium 3.6 3.5 - 5.1 mmol/L    Chloride 100 97 - 108 mmol/L    CO2 24 21 - 32 mmol/L    Anion gap 7 5 - 15 mmol/L    Glucose 127 (H) 65 - 100 mg/dL    BUN 34 (H) 6 - 20 mg/dL    Creatinine 1.24 (H) 0.55 - 1.02 mg/dL    BUN/Creatinine ratio 27 (H) 12 - 20      GFR est AA 49 (L) >60 ml/min/1.73m2    GFR est non-AA 41 (L) >60 ml/min/1.73m2    Calcium 7.1 (L) 8.5 - 10.1 mg/dL    Phosphorus 2.3 (L) 2.6 - 4.7 mg/dL    Albumin 2.0 (L) 3.5 - 5.0 g/dL          Assessment:     Active Problems:    Unstable angina (HCC) (7/25/2021)      Chest pain with moderate risk for cardiac etiology (7/25/2021)        Plan:   Leukocytosis resolving  Continue meropenem, patient being followed by infectious disease  Continue clear liquid diet  Creatinine improved, 1.24.   Recommend maintaining even fluid balance  Consider restarting metoprolol for better rate control      Signed By: Jonh Wallace MD     July 28, 2021

## 2021-07-28 NOTE — PROGRESS NOTES
End of shift report given to [facility], [address], [address] [facility].

## 2021-07-28 NOTE — PROGRESS NOTES
Progress Note    Patient: Norma Grady MRN: [id number]  SSN: [id number]    YOB: 1933  Age: 80 y.o. Sex: female      Admit Date: 7/25/2021    LOS: 3 days     Subjective:   Patient followed septic shock with suspected UTI and concern for possible cholecystitis. Blood and urine cultures pending. Ultrasound guided aspiration yielded dark bilious fluid and cholecystostomy tube left in place. She is afebrile with decreasing WBC, procal and CRP. Currently on Meropenem alone. Objective:     Vitals:    07/28/21 0715 07/28/21 0752 07/28/21 0759 07/28/21 0807   BP:   (!) 102/43    Pulse:  (!) 115 (!) 112 97   Resp:       Temp:       SpO2: 98%      Weight:       Height:            Intake and Output:  Current Shift: No intake/output data recorded. Last three shifts: 07/26 1901 - 07/28 0700  In: 7335.4 [P.O.:120; I.V.:7215.4]  Out: [place] [employer] [WGAUU:4822; Drains:300]    Physical Exam:   Vitals (Levophed) and nursing note reviewed. Constitutional:       Appearance: She is ill-appearing. HENT:      Head: Normocephalic and atraumatic. Right Ear: External ear normal.      Left Ear: External ear normal.      Nose:      Comments: Nasal O2 2L/min (off at the time of my exam)     Mouth/Throat:      Pharynx: Oropharynx is clear. Eyes:      Pupils: Pupils are equal, round, and reactive to light. Cardiovascular:      Rate and Rhythm: Regular rhythm. Tachycardia present. Heart sounds: No murmur heard. Pulmonary:      Breath sounds: Rhonchi and rales present. No wheezing. Abdominal:      General: Bowel sounds are normal.      Palpations: Abdomen is soft. Tenderness: There is abdominal tenderness. There is right CVA tenderness. Genitourinary:     Comments: [place] catheter    Musculoskeletal:      Cervical back: Neck supple. Right lower leg: No edema. Left lower leg: No edema. Skin:     Findings: No rash.    Neurological:      Comments: Somnolent   Psychiatric:      Comments: Somnolent     Lab/Data Review:     WBC 12,900    Procal 1.09 <1.16  CRP 27.80 <29.60     Blood culture (7/26) Pending  Urine culture (7/26) Pending  Body fluid, bile culture (7/27) Pending    Assessment:     Active Problems:    Unstable angina ([facility] [address] (7/25/2021)      Chest pain with moderate risk for cardiac etiology (7/25/2021)    1. Sepsis with septic shock, manifested by fever, hypotension requiring vasopressors, leukocytosis, elevated procal and CRP, improving  2. Possible Complicated UTI (right nephrolithiasis) with pyuria and bacteriuria, urine culture pending, Day #2 IV Meropenem  3. ?Acute cholecystitis (LFTs normal), status post cholecystostomy tube placement, culture peding, Day #2 IV Meropenem  4. Chronic kidney disease  5. Penicillin allergy     Comment:  Clinically responding with cultures still pending. Plan:   1. Continue IV Meropenem  2. Follow-up blood, bile and urine cultures  3.  In am, repeat CBC, procal and CRP     Signed By: Kayla Cleveland MD     July 28, 2021

## 2021-07-29 ENCOUNTER — APPOINTMENT (OUTPATIENT)
Dept: GENERAL RADIOLOGY | Age: 86
DRG: 871 | End: 2021-07-29
Attending: INTERNAL MEDICINE
Payer: MEDICARE

## 2021-07-29 LAB
ALBUMIN SERPL-MCNC: 2.2 G/DL (ref 3.5–5)
ALBUMIN/GLOB SERPL: 0.6 {RATIO} (ref 1.1–2.2)
ALP SERPL-CCNC: 93 U/L (ref 45–117)
ALT SERPL-CCNC: 45 U/L (ref 12–78)
ANION GAP SERPL CALC-SCNC: 6 MMOL/L (ref 5–15)
AST SERPL W P-5'-P-CCNC: 47 U/L (ref 15–37)
ATRIAL RATE: 416 BPM
BASOPHILS # BLD: 0 K/UL (ref 0–0.1)
BASOPHILS NFR BLD: 0 % (ref 0–1)
BILIRUB SERPL-MCNC: 1.8 MG/DL (ref 0.2–1)
BUN SERPL-MCNC: 31 MG/DL (ref 6–20)
BUN/CREAT SERPL: 29 (ref 12–20)
CA-I BLD-MCNC: 7.9 MG/DL (ref 8.5–10.1)
CALCULATED R AXIS, ECG10: 34 DEGREES
CALCULATED T AXIS, ECG11: -37 DEGREES
CHLORIDE SERPL-SCNC: 99 MMOL/L (ref 97–108)
CO2 SERPL-SCNC: 23 MMOL/L (ref 21–32)
CREAT SERPL-MCNC: 1.07 MG/DL (ref 0.55–1.02)
CRP SERPL-MCNC: 28.6 MG/DL (ref 0–0.6)
DIAGNOSIS, 93000: NORMAL
DIFFERENTIAL METHOD BLD: ABNORMAL
EOSINOPHIL # BLD: 0.1 K/UL (ref 0–0.4)
EOSINOPHIL NFR BLD: 1 % (ref 0–7)
ERYTHROCYTE [DISTWIDTH] IN BLOOD BY AUTOMATED COUNT: 16.8 % (ref 11.5–14.5)
GLOBULIN SER CALC-MCNC: 3.5 G/DL (ref 2–4)
GLUCOSE SERPL-MCNC: 105 MG/DL (ref 65–100)
HCT VFR BLD AUTO: 26.8 % (ref 35–47)
HGB BLD-MCNC: 8.9 G/DL (ref 11.5–16)
IMM GRANULOCYTES # BLD AUTO: 0.1 K/UL (ref 0–0.04)
IMM GRANULOCYTES NFR BLD AUTO: 1 % (ref 0–0.5)
INR PPP: 1.6 (ref 0.9–1.1)
LYMPHOCYTES # BLD: 0.6 K/UL (ref 0.8–3.5)
LYMPHOCYTES NFR BLD: 5 % (ref 12–49)
MAGNESIUM SERPL-MCNC: 2.2 MG/DL (ref 1.6–2.4)
MCH RBC QN AUTO: 29.3 PG (ref 26–34)
MCHC RBC AUTO-ENTMCNC: 33.2 G/DL (ref 30–36.5)
MCV RBC AUTO: 88.2 FL (ref 80–99)
MONOCYTES # BLD: 0.6 K/UL (ref 0–1)
MONOCYTES NFR BLD: 5 % (ref 5–13)
NEUTS SEG # BLD: 10.5 K/UL (ref 1.8–8)
NEUTS SEG NFR BLD: 88 % (ref 32–75)
NRBC # BLD: 0 K/UL (ref 0–0.01)
NRBC BLD-RTO: 0 PER 100 WBC
PLATELET # BLD AUTO: 144 K/UL (ref 150–400)
PMV BLD AUTO: 10.8 FL (ref 8.9–12.9)
POTASSIUM SERPL-SCNC: 3.9 MMOL/L (ref 3.5–5.1)
PROCALCITONIN SERPL-MCNC: 0.84 NG/ML
PROT SERPL-MCNC: 5.7 G/DL (ref 6.4–8.2)
PROTHROMBIN TIME: 18.3 SEC (ref 11.9–14.7)
Q-T INTERVAL, ECG07: 386 MS
QRS DURATION, ECG06: 100 MS
QTC CALCULATION (BEZET), ECG08: 485 MS
RBC # BLD AUTO: 3.04 M/UL (ref 3.8–5.2)
SODIUM SERPL-SCNC: 128 MMOL/L (ref 136–145)
SODIUM UR-SCNC: 29 MMOL/L
VENTRICULAR RATE, ECG03: 95 BPM
WBC # BLD AUTO: 11.9 K/UL (ref 3.6–11)

## 2021-07-29 PROCEDURE — 84145 PROCALCITONIN (PCT): CPT

## 2021-07-29 PROCEDURE — 99233 SBSQ HOSP IP/OBS HIGH 50: CPT | Performed by: INTERNAL MEDICINE

## 2021-07-29 PROCEDURE — 85610 PROTHROMBIN TIME: CPT

## 2021-07-29 PROCEDURE — 74011000258 HC RX REV CODE- 258: Performed by: INTERNAL MEDICINE

## 2021-07-29 PROCEDURE — 74011000250 HC RX REV CODE- 250: Performed by: INTERNAL MEDICINE

## 2021-07-29 PROCEDURE — 74011250637 HC RX REV CODE- 250/637: Performed by: NURSE PRACTITIONER

## 2021-07-29 PROCEDURE — 74011250636 HC RX REV CODE- 250/636: Performed by: INTERNAL MEDICINE

## 2021-07-29 PROCEDURE — 86140 C-REACTIVE PROTEIN: CPT

## 2021-07-29 PROCEDURE — 71045 X-RAY EXAM CHEST 1 VIEW: CPT

## 2021-07-29 PROCEDURE — 99232 SBSQ HOSP IP/OBS MODERATE 35: CPT | Performed by: COLON & RECTAL SURGERY

## 2021-07-29 PROCEDURE — 77010033678 HC OXYGEN DAILY

## 2021-07-29 PROCEDURE — 65270000029 HC RM PRIVATE

## 2021-07-29 PROCEDURE — 83935 ASSAY OF URINE OSMOLALITY: CPT

## 2021-07-29 PROCEDURE — 80053 COMPREHEN METABOLIC PANEL: CPT

## 2021-07-29 PROCEDURE — 84300 ASSAY OF URINE SODIUM: CPT

## 2021-07-29 PROCEDURE — 83735 ASSAY OF MAGNESIUM: CPT

## 2021-07-29 PROCEDURE — 74011250636 HC RX REV CODE- 250/636: Performed by: PHYSICIAN ASSISTANT

## 2021-07-29 PROCEDURE — 85025 COMPLETE CBC W/AUTO DIFF WBC: CPT

## 2021-07-29 RX ORDER — HYDROXYZINE 50 MG/ML
50 INJECTION, SOLUTION INTRAMUSCULAR
Status: COMPLETED | OUTPATIENT
Start: 2021-07-29 | End: 2021-07-29

## 2021-07-29 RX ORDER — LORAZEPAM 2 MG/ML
0.5 INJECTION INTRAMUSCULAR
Status: DISCONTINUED | OUTPATIENT
Start: 2021-07-29 | End: 2021-07-31

## 2021-07-29 RX ORDER — HEPARIN SODIUM 5000 [USP'U]/ML
5000 INJECTION, SOLUTION INTRAVENOUS; SUBCUTANEOUS EVERY 12 HOURS
Status: DISCONTINUED | OUTPATIENT
Start: 2021-07-29 | End: 2021-08-02 | Stop reason: HOSPADM

## 2021-07-29 RX ORDER — DILTIAZEM HYDROCHLORIDE 30 MG/1
30 TABLET, FILM COATED ORAL EVERY 6 HOURS
Status: DISCONTINUED | OUTPATIENT
Start: 2021-07-29 | End: 2021-08-02 | Stop reason: HOSPADM

## 2021-07-29 RX ORDER — LORAZEPAM 2 MG/ML
1 INJECTION INTRAMUSCULAR
Status: DISCONTINUED | OUTPATIENT
Start: 2021-07-29 | End: 2021-07-31

## 2021-07-29 RX ADMIN — DOBUTAMINE IN DEXTROSE 2 MCG/KG/MIN: 200 INJECTION, SOLUTION INTRAVENOUS at 00:42

## 2021-07-29 RX ADMIN — HEPARIN SODIUM 5000 UNITS: 5000 INJECTION INTRAVENOUS; SUBCUTANEOUS at 11:15

## 2021-07-29 RX ADMIN — Medication 2000 UNITS: at 09:20

## 2021-07-29 RX ADMIN — HYDROXYZINE HYDROCHLORIDE 50 MG: 50 INJECTION, SOLUTION INTRAMUSCULAR at 22:30

## 2021-07-29 RX ADMIN — LEVOTHYROXINE SODIUM 75 MCG: 75 TABLET ORAL at 07:30

## 2021-07-29 RX ADMIN — ATORVASTATIN CALCIUM 20 MG: 20 TABLET, FILM COATED ORAL at 09:20

## 2021-07-29 RX ADMIN — FAMOTIDINE 20 MG: 20 TABLET ORAL at 09:20

## 2021-07-29 RX ADMIN — Medication 10 ML: at 05:37

## 2021-07-29 RX ADMIN — MEROPENEM 1 G: 1 INJECTION, POWDER, FOR SOLUTION INTRAVENOUS at 11:15

## 2021-07-29 RX ADMIN — MEROPENEM 1 G: 1 INJECTION INTRAVENOUS at 03:52

## 2021-07-29 RX ADMIN — HEPARIN SODIUM 5000 UNITS: 5000 INJECTION INTRAVENOUS; SUBCUTANEOUS at 22:00

## 2021-07-29 RX ADMIN — METOCLOPRAMIDE HYDROCHLORIDE 10 MG: 5 INJECTION INTRAMUSCULAR; INTRAVENOUS at 19:55

## 2021-07-29 RX ADMIN — LORAZEPAM 2 MG: 2 INJECTION INTRAMUSCULAR; INTRAVENOUS at 23:16

## 2021-07-29 RX ADMIN — METOCLOPRAMIDE HYDROCHLORIDE 10 MG: 5 INJECTION INTRAMUSCULAR; INTRAVENOUS at 11:15

## 2021-07-29 RX ADMIN — DOBUTAMINE IN DEXTROSE 1 MCG/KG/MIN: 200 INJECTION, SOLUTION INTRAVENOUS at 01:46

## 2021-07-29 RX ADMIN — ACETAMINOPHEN 650 MG: 325 TABLET ORAL at 00:49

## 2021-07-29 RX ADMIN — MEROPENEM 1 G: 1 INJECTION, POWDER, FOR SOLUTION INTRAVENOUS at 19:55

## 2021-07-29 RX ADMIN — Medication 1 TABLET: at 09:20

## 2021-07-29 RX ADMIN — DILTIAZEM HYDROCHLORIDE 30 MG: 30 TABLET, FILM COATED ORAL at 15:12

## 2021-07-29 RX ADMIN — Medication 10 ML: at 14:00

## 2021-07-29 RX ADMIN — METOCLOPRAMIDE HYDROCHLORIDE 10 MG: 5 INJECTION INTRAMUSCULAR; INTRAVENOUS at 05:37

## 2021-07-29 RX ADMIN — AMIODARONE HYDROCHLORIDE 0.5 MG/MIN: 50 INJECTION, SOLUTION INTRAVENOUS at 00:54

## 2021-07-29 RX ADMIN — METOCLOPRAMIDE HYDROCHLORIDE 10 MG: 5 INJECTION INTRAMUSCULAR; INTRAVENOUS at 00:43

## 2021-07-29 NOTE — PROGRESS NOTES
Progress Note    Patient: Obdulio Morrissey MRN: [id number]  SSN: [id number]    YOB: 1933  Age: 80 y.o. Sex: female      Admit Date: 7/25/2021    LOS: 4 days     Subjective:   Post procedure day 2 status post percutaneous cholecystostomy  Patient seen in bed  Awake alert appropriate and conversant  Denies abdominal pain  Reports nausea is improved  Off pressors    Objective:     Vitals:    07/28/21 2300 07/29/21 0000 07/29/21 0100 07/29/21 0300   BP: 105/81 136/74 137/67    Pulse: 95 97 98    Resp: 11 22 23    Temp: 97.7 °F (36.5 °C)   98 °F (36.7 °C)   SpO2: 96% 96% 97%    Weight:       Height:            Intake and Output:  Current Shift: No intake/output data recorded. Last three shifts: 07/27 1901 - 07/29 0700  In: 4772.4 [P.O.:120; I.V.:4622.4]  Out: 3000 [Urine:2750; Drains:250]    Review of Systems:  ROS     Physical Exam:   Abdomen is soft, nondistended, minimally tender palpation of right upper quadrant, cholecystostomy tube in place and draining    Lab/Data Review:  Recent Results (from the past 24 hour(s))   CBC WITH AUTOMATED DIFF    Collection Time: 07/29/21  4:20 AM   Result Value Ref Range    WBC 11.9 (H) 3.6 - 11.0 K/uL    RBC 3.04 (L) 3.80 - 5.20 M/uL    HGB 8.9 (L) 11.5 - 16.0 g/dL    HCT 26.8 (L) 35.0 - 47.0 %    MCV 88.2 80.0 - 99.0 FL    MCH 29.3 26.0 - 34.0 PG    MCHC 33.2 30.0 - 36.5 g/dL    RDW 16.8 (H) 11.5 - 14.5 %    PLATELET 254 (L) 472 - 400 K/uL    MPV 10.8 8.9 - 12.9 FL    NRBC 0.0 0.0  WBC    ABSOLUTE NRBC 0.00 0.00 - 0.01 K/uL    NEUTROPHILS 88 (H) 32 - 75 %    LYMPHOCYTES 5 (L) 12 - 49 %    MONOCYTES 5 5 - 13 %    EOSINOPHILS 1 0 - 7 %    BASOPHILS 0 0 - 1 %    IMMATURE GRANULOCYTES 1 (H) 0 - 0.5 %    ABS. NEUTROPHILS 10.5 (H) 1.8 - 8.0 K/UL    ABS. LYMPHOCYTES 0.6 (L) 0.8 - 3.5 K/UL    ABS. MONOCYTES 0.6 0.0 - 1.0 K/UL    ABS. EOSINOPHILS 0.1 0.0 - 0.4 K/UL    ABS. BASOPHILS 0.0 0.0 - 0.1 K/UL    ABS. IMM.  GRANS. 0.1 (H) 0.00 - 0.04 K/UL    DF AUTOMATED METABOLIC PANEL, COMPREHENSIVE    Collection Time: 07/29/21  4:20 AM   Result Value Ref Range    Sodium 128 (L) 136 - 145 mmol/L    Potassium 3.9 3.5 - 5.1 mmol/L    Chloride 99 97 - 108 mmol/L    CO2 23 21 - 32 mmol/L    Anion gap 6 5 - 15 mmol/L    Glucose 105 (H) 65 - 100 mg/dL    BUN 31 (H) 6 - 20 mg/dL    Creatinine 1.07 (H) 0.55 - 1.02 mg/dL    BUN/Creatinine ratio 29 (H) 12 - 20      GFR est AA 59 (L) >60 ml/min/1.73m2    GFR est non-AA 48 (L) >60 ml/min/1.73m2    Calcium 7.9 (L) 8.5 - 10.1 mg/dL    Bilirubin, total 1.8 (H) 0.2 - 1.0 mg/dL    AST (SGOT) 47 (H) 15 - 37 U/L    ALT (SGPT) 45 12 - 78 U/L    Alk.  phosphatase 93 45 - 117 U/L    Protein, total 5.7 (L) 6.4 - 8.2 g/dL    Albumin 2.2 (L) 3.5 - 5.0 g/dL    Globulin 3.5 2.0 - 4.0 g/dL    A-G Ratio 0.6 (L) 1.1 - 2.2     MAGNESIUM    Collection Time: 07/29/21  4:20 AM   Result Value Ref Range    Magnesium 2.2 1.6 - 2.4 mg/dL   C REACTIVE PROTEIN, QT    Collection Time: 07/29/21  4:20 AM   Result Value Ref Range    C-Reactive protein 28.60 (H) 0.00 - 0.60 mg/dL          Assessment:     Active Problems:    Unstable angina ([address] (7/25/2021)      Chest pain with moderate risk for cardiac etiology (7/25/2021)        Plan:   Doing well, off pressors  Leukocytosis continues to resolve, white blood cell count 11,900  Hyponatremia noted sodium 128 will defer to medical team for management  Can transition to oral amiodarone  We will start regular diet  Okay to transfer to cardiac floor from surgery perspective    Signed By: Luisa Apodaca MD     July 29, 2021

## 2021-07-29 NOTE — PROGRESS NOTES
Nephrology Consult    Patient: Maynor Arriaga MRN: [id number]  SSN: [id number]    YOB: 1933  Age: 80 y.o.   Sex: female      Subjective:   Pt seen in ICU  Cr 1.0  Na 128  Off IVF  On pressors  On amiodarone     Past Medical History:   Diagnosis Date    Atrial fibrillation ([facility]     CAD (coronary artery disease)     Diabetes mellitus ([facility]     resolved    Endometrial cancer ([facility] [facility]     s/p TAHBSO, RT in 1990s    Hypertension     Skin cancer      Past Surgical History:   Procedure Laterality Date    HX HYSTERECTOMY      IR CHOLECYSTOSTOMY PERCUTANEOUS  7/27/2021      Family History   Problem Relation Age of Onset    Hypertension Father     Cancer Father     Hypertension Brother     Ovarian Cancer Other         daughter, unsure if genetic testing performed     Social History     Tobacco Use    Smoking status: Former Smoker    Smokeless tobacco: Never Used    Tobacco comment: quit >50 yrs ago   Substance Use Topics    Alcohol use: Not on file      Current Facility-Administered Medications   Medication Dose Route Frequency Provider Last Rate Last Admin    meropenem (MERREM) 1 g in sterile water (preservative free) 20 mL IV syringe  1 g IntraVENous Q8H Anup Bower MD   1 g at 07/29/21 1115    heparin (porcine) injection 5,000 Units  5,000 Units SubCUTAneous Q12H Libia Bower MD   5,000 Units at 07/29/21 1115    dilTIAZem IR (CARDIZEM) tablet 30 mg  30 mg Oral Q6H Jimena Pedraza, NP   30 mg at 07/29/21 1512    metoclopramide HCl (REGLAN) injection 10 mg  10 mg IntraVENous Q6H Anup Bower MD   10 mg at 07/29/21 1115    NOREPINephrine (LEVOPHED) 8 mg in 5% dextrose 250mL (32 mcg/mL) infusion  0.5-30 mcg/min IntraVENous TITRATE Libia Bower MD   Stopped at 07/28/21 0500    promethazine (PHENERGAN) tablet 25 mg  25 mg Oral Q4H PRN Johnnie Chowdhury MD   25 mg at 07/28/21 0518    DOBUTamine (DOBUTREX) 500 mg/250 mL (2,000 mcg/mL) infusion  0-10 mcg/kg/min IntraVENous TITRATE Helder Stallings MD 2.6 mL/hr at 07/29/21 0146 1 mcg/kg/min at 07/29/21 0146    sodium chloride (NS) flush 5-40 mL  5-40 mL IntraVENous Q8H Cuauhtemoc Muss, NP   10 mL at 07/29/21 1400    sodium chloride (NS) flush 5-40 mL  5-40 mL IntraVENous PRN Cuauhtemoc Muss, NP        acetaminophen (TYLENOL) tablet 650 mg  650 mg Oral Q6H PRN Cuauhtemoc Muss, NP   650 mg at 07/29/21 0049    Or    acetaminophen (TYLENOL) suppository 650 mg  650 mg Rectal Q6H PRN Cuauhtemoc Muss, NP        polyethylene glycol (MIRALAX) packet 17 g  17 g Oral DAILY PRN Cuauhtemoc Muss, NP        bisacodyL (DULCOLAX) tablet 5 mg  5 mg Oral DAILY PRN Cuauhtemoc Muss, NP        famotidine (PEPCID) tablet 20 mg  20 mg Oral BID Cuauhtemoc Muss, NP   20 mg at 07/29/21 0920    atorvastatin (LIPITOR) tablet 20 mg  20 mg Oral DAILY Cuauhtemoc Muss, NP   20 mg at 07/29/21 0920    calcium-vitamin D 600 mg(1,500mg) -200 unit per tablet 1 Tablet  1 Tablet Oral BID Cuauhtemoc Muss, NP   1 Tablet at 07/29/21 0920    cholecalciferol (VITAMIN D3) (1000 Units /25 mcg) tablet 2,000 Units  2,000 Units Oral DAILY Cuauhtemoc Chloé, NP   2,000 Units at 07/29/21 0920    [Held by provider] isosorbide mononitrate ER (IMDUR) tablet 30 mg  30 mg Oral DAILY Cuauhtemoc Muss, NP   30 mg at 07/26/21 9332    levothyroxine (SYNTHROID) tablet 75 mcg  75 mcg Oral ACB Cuauhtemoc Muss, NP   75 mcg at 07/29/21 0730    [Held by provider] metoprolol succinate (TOPROL-XL) XL tablet 50 mg  50 mg Oral DAILY Cuauhtemoc Muss, NP   50 mg at 07/26/21 0809    [Held by provider] minoxidiL (LONITEN) tablet 5 mg  5 mg Oral DAILY Cuauhtemoc Muss, NP   5 mg at 07/26/21 2175        Allergies   Allergen Reactions    Penicillins Unknown (comments)       Review of Systems:  A comprehensive review of systems was negative except for that written in the History of Present Illness.     Objective:     Vitals:    07/29/21 0900 07/29/21 1000 07/29/21 1019 07/29/21 1100   BP: (!) 134/91 137/70  131/83   Pulse: 88 78  75   Resp: 24 24  24   Temp:       SpO2: 97% 98% 97% 99%   Weight:       Height:            Physical Exam:  General: NAD  Eyes: sclera anicteric  Oral Cavity: No thrush or ulcers  Neck: no JVD  Chest: Fair bilateral air entry  Heart: normal sounds  Abdomen: soft and  tender +  :  [place]+  Lower Extremities: no edema  Skin: no rash  Neuro: intact  Psychiatric: non-depressed            Assessment:     Hospital Problems  Never Reviewed        Codes Class Noted POA    Unstable angina ([facility] ICD-10-CM: I20.0  ICD-9-CM: 411.1  7/25/2021 Unknown        Chest pain with moderate risk for cardiac etiology ICD-10-CM: R07.9  ICD-9-CM: 786.50  7/25/2021 Unknown              Plan:   1 acute kidney injury:  -2/2 prerenal azotemia from sepsis/hypotension plus on diuretics and lisinopril.  -On admission creatinine was 0.9 and has bumped to 1.8 in the last 24 hours.   -Cr has improved to 1.0   -Clinically no evidence of volume overload. Chest x-ray is clear. -2D echocardiogram showed preserved LVEF. -Agree to hold lisinopril diuretics and nitrates. -off  IV fluids.    -No evidence of hydronephrosis per CT of the abdomen. 2.  Hypokalemia.    -From low p.o. intake/hypomagnesemia. -K was 3.2 and magnesium 1.3.    -replaced with K and IV magnesium sulfate.  -K 3.6, Mg 2.0    3. Septic shock:    -Admitted with right upper quadrant pain, WBC 21K, hypotensive. -CT abdomen showed distended gallbladder and fluid around diagnosed with acute cholecystitis General surgery planning to do cholecystotomy.    -On IV antibiotics. On pressor. 4. anemia.    -Hemoglobin 8.8.   - I will send iron studies and ferritin level    5. A. fib. With RVR. on IV amiodarone drip. 6. DVT prophylaxis:  -changed lovenox to unfractionated sq hep    7.  Hyponatremia:  -Na 128  -will send [place] and [place]      Signed By: Negrito Ramesh MD     July 29, 2021

## 2021-07-29 NOTE — PROGRESS NOTES
Progress Note    Patient: Zelalem Tijerina MRN: [id number]  SSN: [id number]    YOB: 1933  Age: 80 y.o. Sex: female      Admit Date: 7/25/2021    LOS: 4 days     Subjective:   Patient followed septic shock with suspected UTI and concern for possible cholecystitis. Blood and urine cultures pending. Ultrasound guided aspiration yielded dark bilious fluid and cholecystostomy tube left in place. She is afebrile with decreasing WBC, procal and CRP. Currently on Meropenem alone. Patient resting comfortably with multiple family members at the bedside. No complaints offered. Objective:     Vitals:    07/29/21 0400 07/29/21 0500 07/29/21 0600 07/29/21 0700   BP: 119/62 119/61 128/82    Pulse: 93 88 87    Resp: 25 23 24    Temp:    98.4 °F (36.9 °C)   SpO2: 98% 98% 98%    Weight:       Height:            Intake and Output:  Current Shift: No intake/output data recorded. Last three shifts: 07/27 1901 - 07/29 0700  In: 4772.4 [P.O.:120; I.V.:4622.4]  Out: 3000 [Urine:2750; Drains:250]    Physical Exam:   Vitals (Levophed) and nursing note reviewed. Constitutional:       Appearance: She is ill-appearing. HENT: unremarkable   Eyes:      Pupils: Pupils are equal, round, and reactive to light. Cardiovascular:      Rate and Rhythm: Regular rhythm. Tachycardia present. Heart sounds: No murmur heard. Pulmonary: clear bilaterally   Abdominal:      General: Bowel sounds are normal.      Palpations: Abdomen is soft. Tenderness: There is no abdominal tenderness   Genitourinary:     Comments: [place] catheter  Musculoskeletal:      Cervical back: Neck supple. Right lower leg: No edema. Left lower leg: No edema. Skin:     Findings: No rash.    Neurological: nonfocal, no confusion  Psychiatric: normal behavior     Lab/Data Review:     WBC 11,900    Procal 0.84 <1.09 <1.16  CRP 28.60 <27.80 <29.60     Blood culture (7/26) No growth 2 days  Urine culture (7/26) >100,000 cfu/ml Gram negative rods  Body fluid, bile culture (7/27) Pending    Assessment:     Active Problems:    Unstable angina ([address] (7/25/2021)      Chest pain with moderate risk for cardiac etiology (7/25/2021)    1. Sepsis with septic shock, manifested by fever, hypotension requiring vasopressors, leukocytosis, elevated procal and CRP, improving except CRP  2. Complicated UTI (right nephrolithiasis) with pyuria and bacteriuria, secondary to Gram negative rods, Day #3 IV Meropenem  3. ?Acute cholecystitis (LFTs normal), status post cholecystostomy tube placement, culture pending, Day #3 IV Meropenem  4. Chronic kidney disease  5. Penicillin allergy     Comment:  Clinically responding except for CRP. No need to adjust antibiotics at this time. Both urine and biliary drainage showing Gram negative rods. Plan:   1. Continue IV Meropenem  2. Follow-up blood, bile and urine cultures  3.  In am, repeat CBC, procal and CRP     Signed By: Tamia Barron MD     July 29, 2021

## 2021-07-29 NOTE — PROGRESS NOTES
Hospitalist Progress Note    Subjective:   Daily Progress Note: 7/29/2021 8:49 AM    Hospital Course:  Dillan Hussein is a 80 y.o. female with a  past medical history of HTN, CAD, and atrial fibrillation, who presented to the ED with sharp, substernal chest pain and associated nausea and found to be in atrial fibrillation with RVR. Jalyn Brittle Patient additionally reported experiencing vaginal bleeding while on Eliquis and now on warfarin, she has a gynecology appointment scheduled for evaluation. Given ASA, pain medication and nitroglycerin with relief. Patient has leukocytosis of unclear etiology with an increasing white blood cell count since admission. Tenderness in the right upper quadrant as well as left upper quadrant. CT scan of the abdomen pelvis pending. Right upper quadrant ultrasound pending. Prophylactically will treat with Rocephin. Urine culture and blood culture pending. INR subtherapeutic at 1.4, BNP 4475. x4 Troponins negative. EKG showed afib. CXR showed central pulmonary vascular prominence and coarse interstitial opacities. Chest pain resolved. Cardiology consulted.  Echo revealed LVEF 65%, dilated left atrium, overnight patient became increasingly hypotensive, was transferred to the ICU, central line placed, obtained CT abdomen pelvis showing biliary sludge, urinalysis consistent with urinary tract infection, patient now s/p cholecystostomy tube, doing well on Meropenem    Subjective:   Patient seen and evaluated at bedside, patient currently has no active complaints, discussed with RN  Current Facility-Administered Medications   Medication Dose Route Frequency    metoclopramide HCl (REGLAN) injection 10 mg  10 mg IntraVENous Q6H    NOREPINephrine (LEVOPHED) 8 mg in 5% dextrose 250mL (32 mcg/mL) infusion  0.5-30 mcg/min IntraVENous TITRATE    promethazine (PHENERGAN) tablet 25 mg  25 mg Oral Q4H PRN    meropenem (MERREM) 1 g in sterile water (preservative free) 20 mL IV syringe  1 g IntraVENous Q12H    heparin (porcine) 1,000 unit/mL injection 5,000 Units  5,000 Units SubCUTAneous Q12H    amiodarone (CORDARONE) 375 mg in dextrose 5% 250 mL infusion  1 mg/min IntraVENous CONTINUOUS    DOBUTamine (DOBUTREX) 500 mg/250 mL (2,000 mcg/mL) infusion  0-10 mcg/kg/min IntraVENous TITRATE    sodium chloride (NS) flush 5-40 mL  5-40 mL IntraVENous Q8H    sodium chloride (NS) flush 5-40 mL  5-40 mL IntraVENous PRN    acetaminophen (TYLENOL) tablet 650 mg  650 mg Oral Q6H PRN    Or    acetaminophen (TYLENOL) suppository 650 mg  650 mg Rectal Q6H PRN    polyethylene glycol (MIRALAX) packet 17 g  17 g Oral DAILY PRN    bisacodyL (DULCOLAX) tablet 5 mg  5 mg Oral DAILY PRN    famotidine (PEPCID) tablet 20 mg  20 mg Oral BID    atorvastatin (LIPITOR) tablet 20 mg  20 mg Oral DAILY    calcium-vitamin D 600 mg(1,500mg) -200 unit per tablet 1 Tablet  1 Tablet Oral BID    cholecalciferol (VITAMIN D3) (1000 Units /25 mcg) tablet 2,000 Units  2,000 Units Oral DAILY    [Held by provider] isosorbide mononitrate ER (IMDUR) tablet 30 mg  30 mg Oral DAILY    levothyroxine (SYNTHROID) tablet 75 mcg  75 mcg Oral ACB    [Held by provider] metoprolol succinate (TOPROL-XL) XL tablet 50 mg  50 mg Oral DAILY    [Held by provider] minoxidiL (LONITEN) tablet 5 mg  5 mg Oral DAILY        Review of Systems  Review of Systems   Constitutional: Negative for chills, fever and weight loss. HENT: Negative. Eyes: Negative. Respiratory: Negative for cough, sputum production, shortness of breath and wheezing. Cardiovascular: Negative for chest pain, palpitations, orthopnea and leg swelling. Gastrointestinal: Positive for abdominal pain (minimal, epigastric and RUQ) and nausea. Negative for constipation, diarrhea and vomiting. Genitourinary: Negative for dysuria, frequency and urgency. Musculoskeletal: Negative. Neurological: Negative.          Objective:     Visit Vitals  /67 (BP 1 Location: Left upper arm, BP Patient Position: At rest)   Pulse 98   Temp 98 °F (36.7 °C)   Resp 23   Ht 5' 4\" (1.626 m)   Wt 85.3 kg (188 lb)   SpO2 97%   Breastfeeding No   BMI 32.27 kg/m²    O2 Flow Rate (L/min): 2 l/min O2 Device: Nasal cannula    Temp (24hrs), Av.4 °F (36.9 °C), Min:97.7 °F (36.5 °C), Max:99.1 °F (37.3 °C)      No intake/output data recorded.  1901 -  0700  In: 4772.4 [P.O.:120; I.V.:4622.4]  Out: 3000 [Urine:2750; Drains:250]    Recent Results (from the past 24 hour(s))   CBC WITH AUTOMATED DIFF    Collection Time: 21  4:20 AM   Result Value Ref Range    WBC 11.9 (H) 3.6 - 11.0 K/uL    RBC 3.04 (L) 3.80 - 5.20 M/uL    HGB 8.9 (L) 11.5 - 16.0 g/dL    HCT 26.8 (L) 35.0 - 47.0 %    MCV 88.2 80.0 - 99.0 FL    MCH 29.3 26.0 - 34.0 PG    MCHC 33.2 30.0 - 36.5 g/dL    RDW 16.8 (H) 11.5 - 14.5 %    PLATELET 195 (L) 573 - 400 K/uL    MPV 10.8 8.9 - 12.9 FL    NRBC 0.0 0.0  WBC    ABSOLUTE NRBC 0.00 0.00 - 0.01 K/uL    NEUTROPHILS 88 (H) 32 - 75 %    LYMPHOCYTES 5 (L) 12 - 49 %    MONOCYTES 5 5 - 13 %    EOSINOPHILS 1 0 - 7 %    BASOPHILS 0 0 - 1 %    IMMATURE GRANULOCYTES 1 (H) 0 - 0.5 %    ABS. NEUTROPHILS 10.5 (H) 1.8 - 8.0 K/UL    ABS. LYMPHOCYTES 0.6 (L) 0.8 - 3.5 K/UL    ABS. MONOCYTES 0.6 0.0 - 1.0 K/UL    ABS. EOSINOPHILS 0.1 0.0 - 0.4 K/UL    ABS. BASOPHILS 0.0 0.0 - 0.1 K/UL    ABS. IMM.  GRANS. 0.1 (H) 0.00 - 0.04 K/UL    DF AUTOMATED     METABOLIC PANEL, COMPREHENSIVE    Collection Time: 21  4:20 AM   Result Value Ref Range    Sodium 128 (L) 136 - 145 mmol/L    Potassium 3.9 3.5 - 5.1 mmol/L    Chloride 99 97 - 108 mmol/L    CO2 23 21 - 32 mmol/L    Anion gap 6 5 - 15 mmol/L    Glucose 105 (H) 65 - 100 mg/dL    BUN 31 (H) 6 - 20 mg/dL    Creatinine 1.07 (H) 0.55 - 1.02 mg/dL    BUN/Creatinine ratio 29 (H) 12 - 20      GFR est AA 59 (L) >60 ml/min/1.73m2    GFR est non-AA 48 (L) >60 ml/min/1.73m2    Calcium 7.9 (L) 8.5 - 10.1 mg/dL    Bilirubin, total 1.8 (H) 0.2 - 1.0 mg/dL    AST (SGOT) 47 (H) 15 - 37 U/L    ALT (SGPT) 45 12 - 78 U/L    Alk. phosphatase 93 45 - 117 U/L    Protein, total 5.7 (L) 6.4 - 8.2 g/dL    Albumin 2.2 (L) 3.5 - 5.0 g/dL    Globulin 3.5 2.0 - 4.0 g/dL    A-G Ratio 0.6 (L) 1.1 - 2.2     MAGNESIUM    Collection Time: 07/29/21  4:20 AM   Result Value Ref Range    Magnesium 2.2 1.6 - 2.4 mg/dL   C REACTIVE PROTEIN, QT    Collection Time: 07/29/21  4:20 AM   Result Value Ref Range    C-Reactive protein 28.60 (H) 0.00 - 0.60 mg/dL        XR CHEST PORT   Final Result      IR CHOLECYSTOSTOMY PERCUTANEOUS   Final Result   Successful ultrasound and fluoroscopic guided aspiration and   percutaneous cholecystostomy tube placement. CT ABD PELV WO CONT   Final Result   Findings are most suggestive of cholecystitis given the stranding   and fluid centered in the region of the gallbladder. Duodenitis and pancreatitis   might be considered in the differential given the location. Nonobstructing right   nephrolithiasis, bilateral renal cysts. Findings in the lung bases suggestive of   CHF/pulmonary edema with bilateral effusions and compressive bilateral lower   lobe atelectasis. Three-vessel coronary arterial calcifications. Diffuse   aortoiliac arteriosclerotic calcifications. XR CHEST PORT   Final Result      US RUQ   Final Result   Gallbladder sludge, without demonstrated stones      XR CHEST PORT   Final Result      XR CHEST PORT   Final Result   Central pulmonary vascular prominence likely accounting for the fullness in the   beatrice. There are also likely coarsened interstitial opacities that could   represent early interstitial edema or other interstitial process. No focal   airspace consolidation is evident. XR CHEST PORT    (Results Pending)        PHYSICAL EXAM:    Physical Exam  Vitals reviewed. Constitutional:       General: She is not in acute distress. Appearance: She is not ill-appearing or diaphoretic. HENT:      Head: Normocephalic and atraumatic. Mouth/Throat:      Mouth: Mucous membranes are moist.      Pharynx: Oropharynx is clear. Eyes:      Conjunctiva/sclera: Conjunctivae normal.      Pupils: Pupils are equal, round, and reactive to light. Cardiovascular:      Rate and Rhythm: Normal rate and regular rhythm. Pulses: Normal pulses. Heart sounds: Normal heart sounds. Pulmonary:      Effort: Pulmonary effort is normal.      Breath sounds: Normal breath sounds. No wheezing, rhonchi or rales. Chest:      Chest wall: No tenderness. Abdominal:      General: Abdomen is flat. There is no distension. Palpations: Abdomen is soft. There is no mass. Tenderness: There is abdominal tenderness (epigastric, moderate to palpation). Musculoskeletal:         General: No tenderness or deformity. Normal range of motion. Skin:     General: Skin is warm. Neurological:      General: No focal deficit present. Mental Status: She is alert and oriented to person, place, and time. Data Review    Recent Results (from the past 24 hour(s))   CBC WITH AUTOMATED DIFF    Collection Time: 07/29/21  4:20 AM   Result Value Ref Range    WBC 11.9 (H) 3.6 - 11.0 K/uL    RBC 3.04 (L) 3.80 - 5.20 M/uL    HGB 8.9 (L) 11.5 - 16.0 g/dL    HCT 26.8 (L) 35.0 - 47.0 %    MCV 88.2 80.0 - 99.0 FL    MCH 29.3 26.0 - 34.0 PG    MCHC 33.2 30.0 - 36.5 g/dL    RDW 16.8 (H) 11.5 - 14.5 %    PLATELET 880 (L) 600 - 400 K/uL    MPV 10.8 8.9 - 12.9 FL    NRBC 0.0 0.0  WBC    ABSOLUTE NRBC 0.00 0.00 - 0.01 K/uL    NEUTROPHILS 88 (H) 32 - 75 %    LYMPHOCYTES 5 (L) 12 - 49 %    MONOCYTES 5 5 - 13 %    EOSINOPHILS 1 0 - 7 %    BASOPHILS 0 0 - 1 %    IMMATURE GRANULOCYTES 1 (H) 0 - 0.5 %    ABS. NEUTROPHILS 10.5 (H) 1.8 - 8.0 K/UL    ABS. LYMPHOCYTES 0.6 (L) 0.8 - 3.5 K/UL    ABS. MONOCYTES 0.6 0.0 - 1.0 K/UL    ABS. EOSINOPHILS 0.1 0.0 - 0.4 K/UL    ABS. BASOPHILS 0.0 0.0 - 0.1 K/UL    ABS. IMM.  GRANS. 0.1 (H) 0.00 - 0.04 K/UL    DF AUTOMATED     METABOLIC PANEL, COMPREHENSIVE    Collection Time: 07/29/21  4:20 AM   Result Value Ref Range    Sodium 128 (L) 136 - 145 mmol/L    Potassium 3.9 3.5 - 5.1 mmol/L    Chloride 99 97 - 108 mmol/L    CO2 23 21 - 32 mmol/L    Anion gap 6 5 - 15 mmol/L    Glucose 105 (H) 65 - 100 mg/dL    BUN 31 (H) 6 - 20 mg/dL    Creatinine 1.07 (H) 0.55 - 1.02 mg/dL    BUN/Creatinine ratio 29 (H) 12 - 20      GFR est AA 59 (L) >60 ml/min/1.73m2    GFR est non-AA 48 (L) >60 ml/min/1.73m2    Calcium 7.9 (L) 8.5 - 10.1 mg/dL    Bilirubin, total 1.8 (H) 0.2 - 1.0 mg/dL    AST (SGOT) 47 (H) 15 - 37 U/L    ALT (SGPT) 45 12 - 78 U/L    Alk. phosphatase 93 45 - 117 U/L    Protein, total 5.7 (L) 6.4 - 8.2 g/dL    Albumin 2.2 (L) 3.5 - 5.0 g/dL    Globulin 3.5 2.0 - 4.0 g/dL    A-G Ratio 0.6 (L) 1.1 - 2.2     MAGNESIUM    Collection Time: 07/29/21  4:20 AM   Result Value Ref Range    Magnesium 2.2 1.6 - 2.4 mg/dL   C REACTIVE PROTEIN, QT    Collection Time: 07/29/21  4:20 AM   Result Value Ref Range    C-Reactive protein 28.60 (H) 0.00 - 0.60 mg/dL        Assessment/Plan:     Active Problems:    Unstable angina ([address] (7/25/2021)      Chest pain with moderate risk for cardiac etiology (7/25/2021)      Impression:     1. Chest pain r/o ACS  2. CHF  3. A-fib  4. CAD  5. Hypertension  6. Hyperlipidemia  7. Hypothyroidism  8. Leukocytosis  9. Hypokalemia  10.  Vaginal bleeding     Plan:    Septic shock secondary to cholecystitisof note patient found to be in septic shock yesterday not responding to fluid resuscitation, currently now weaned off pressors based on patient's clinical presentation multifactorial including secondary to cholecystitis based on CT abdomen pelvis results versus urinary tract infection, currently s/p cholecystostomy tube placement, doing well  Follow-up blood cultures  Weaned off of pressors  Continue Meropenem for antibiotic coverage  General surgery consult appreciated    Urinary tract infectionpatient presents with septic shock with likely component of urinary tract infection given urinalysis  Follow blood cultures  Weaned off pressors  Follow-up urine culture  Continue Meropenem for antibiotic coverage    Acute decompensated heart failure with preserved ejection fractionpatient currently appears to be euvolemic at this time  Frequent intake and output monitoring  Maintain euvolemic status  Cardiology consult appreciated    Hypomagnesemiaresolved    Hypokalemiaresolved    Acute kidney injurycurrently resolved  Continue to trend serum creatinine  Nephrology consult appreciated    Atrial fibrillation with RVRof note patient found to be in atrial fibrillation with RVR upon presentation, currently remains hemodynamically stable at this time  Continue telemetry monitoring  Continue amiodarone GTT  Hold anticoagulation in the setting of patient possibly needing procedure  Follow cardiology recommendations    Hypertensionholding home antihypertensive medications    Vaginal bleedingof note patient found to have vaginal bleeding, status post total abdominal hysterectomy due to endometrial cancer  Maintain active type and screen  Continue to trend hemoglobin and hematocrit  Follow gynecology recommendations    ProphylaxisSCDs  FENregular diet, replete potassium and magnesium  DNR, patient surrogate decision-maker and power of  is karly Sales.   Disposition -transfer to telemetry out of ICU    Critical care time spent 45 minutes involving direct patient care as well as reviewing patient's labs and coordination of care with nursing staff

## 2021-07-29 NOTE — PROGRESS NOTES
CARDIOLOGY PROGRESS NOTE     Patient seen and examined. This is a patient who is followed for chest pain. Patient has a history of hypertension, coronary artery disease, and atrial fibrillation. Patient is  Awake and alert. Denies chest pain or palpitations. Family at the bedside. No other complaints reported. Telemetry reviewed, there were no events noted in the past 24 hours. Atrial fibrillation; heart rate 80-100s    Pertinent review of systems items noted above, all other systems are negative. Current medications reviewed. Physical Examination  Vital signs are stable. Blood pressure 128/82 , Pulse 87  No apparent distress. Heart is regular, rate and rhythm. Normal S1, S2, no murmurs are appreciated. Lungs are clear bilaterally. Abdomen is soft, nontender, normal bowel sounds. Extremities have no edema. Labs reviewed. 7/29/21  WBC 11.9, trending down  Hgb 8.9  Mg 2.6    7/27/21 - 2- D echo:   LV: Calculated LVEF is 65%. Normal cavity size, wall thickness, systolic function (ejection fraction normal) and diastolic function. Wall motion: normal. Normal left ventricular strain. LA: Dilated left atrium. TV: Mild to moderate tricuspid valve regurgitation is present. Diastolic function if indeterminate given presence of atrial fibrillation. Given age and dilated left atrium, likely has some degree of diastolic dysfunction. Case discussed with Dr. Akhil Brasher and our impression and recommendations are as follows:    1. Chest pain: No active chest pain. Troponins are negative x 4. Serial EKGs are nonischemic. ACS ruled out. Echocardiogram with preserved EF and no wall motion abnormalities. Continue telemetry monitoring. Isosorbide on hold due to low BP. Continue dobutamine. 2. Atrial fibrillation: Continue amio gtt will transition to PO. Her CHADS2-VASc score is 4; will transition from heparin to coumadin. Continue telemetry monitoring. Keep serum potassium between 4-5 and serum magnesium > 2. Will replete magnesium. 3. Sinus tachycardia: compensatory due to sepsis, cholecystitis. Hold all beta blockers    4. Hypotension: blood pressure appears close to goal. No longer requiring pressor support. 5. Acute cholecystis: surgery on the case. Given tachycardia and negative trops x4, with normal TTE, no further risk stratification needed prior to procedure/surgery. Please do not hesitate to call me or Dr. Elvis Estrella if additional questions arise. Seen in conjunction with Eve Diehl NP.  Agree with above    August Ware MD, [facility], [address]  Structural Heart Disease  Endovascular and Vascular Medicine  Interventional Cardiology  [facility]  [phone]

## 2021-07-29 NOTE — PROGRESS NOTES
EMA Plan:      -d/c to SNF [facility]) when medically stable          SW spoke w/patient's daughter Lyndsay Leija, and informed of SNF acceptance to [facility], [address]). Daughter is agreeable to this facility at time of discharge when medically stable. Medicare 2nd IM letter prior to discharge.        DEANGELO Singh

## 2021-07-30 LAB
ALBUMIN SERPL-MCNC: 2.1 G/DL (ref 3.5–5)
ALBUMIN SERPL-MCNC: 2.1 G/DL (ref 3.5–5)
ALBUMIN/GLOB SERPL: 0.6 {RATIO} (ref 1.1–2.2)
ALP SERPL-CCNC: 105 U/L (ref 45–117)
ALT SERPL-CCNC: 52 U/L (ref 12–78)
ANION GAP SERPL CALC-SCNC: 3 MMOL/L (ref 5–15)
ANION GAP SERPL CALC-SCNC: 6 MMOL/L (ref 5–15)
AST SERPL W P-5'-P-CCNC: 43 U/L (ref 15–37)
BACTERIA SPEC CULT: ABNORMAL
BACTERIA SPEC CULT: NORMAL
BACTERIA SPEC CULT: NORMAL
BASOPHILS # BLD: 0 K/UL (ref 0–0.1)
BASOPHILS NFR BLD: 0 % (ref 0–1)
BILIRUB SERPL-MCNC: 1.2 MG/DL (ref 0.2–1)
BUN SERPL-MCNC: 17 MG/DL (ref 6–20)
BUN SERPL-MCNC: 18 MG/DL (ref 6–20)
BUN/CREAT SERPL: 26 (ref 12–20)
BUN/CREAT SERPL: 27 (ref 12–20)
CA-I BLD-MCNC: 8.5 MG/DL (ref 8.5–10.1)
CA-I BLD-MCNC: 8.7 MG/DL (ref 8.5–10.1)
CHLORIDE SERPL-SCNC: 109 MMOL/L (ref 97–108)
CHLORIDE SERPL-SCNC: 109 MMOL/L (ref 97–108)
CO2 SERPL-SCNC: 25 MMOL/L (ref 21–32)
CO2 SERPL-SCNC: 28 MMOL/L (ref 21–32)
COLONY COUNT,CNT: ABNORMAL
CREAT SERPL-MCNC: 0.62 MG/DL (ref 0.55–1.02)
CREAT SERPL-MCNC: 0.69 MG/DL (ref 0.55–1.02)
CRP SERPL-MCNC: 14.9 MG/DL (ref 0–0.6)
DIFFERENTIAL METHOD BLD: ABNORMAL
EOSINOPHIL # BLD: 0.1 K/UL (ref 0–0.4)
EOSINOPHIL NFR BLD: 1 % (ref 0–7)
ERYTHROCYTE [DISTWIDTH] IN BLOOD BY AUTOMATED COUNT: 17.1 % (ref 11.5–14.5)
GLOBULIN SER CALC-MCNC: 3.4 G/DL (ref 2–4)
GLUCOSE SERPL-MCNC: 82 MG/DL (ref 65–100)
GLUCOSE SERPL-MCNC: 84 MG/DL (ref 65–100)
GRAM STN SPEC: NORMAL
GRAM STN SPEC: NORMAL
HCT VFR BLD AUTO: 30.3 % (ref 35–47)
HGB BLD-MCNC: 10.2 G/DL (ref 11.5–16)
IMM GRANULOCYTES # BLD AUTO: 0.3 K/UL (ref 0–0.04)
IMM GRANULOCYTES NFR BLD AUTO: 3 % (ref 0–0.5)
INR PPP: 1.2 (ref 0.9–1.1)
LYMPHOCYTES # BLD: 0.9 K/UL (ref 0.8–3.5)
LYMPHOCYTES NFR BLD: 9 % (ref 12–49)
MAGNESIUM SERPL-MCNC: 2 MG/DL (ref 1.6–2.4)
MCH RBC QN AUTO: 29.6 PG (ref 26–34)
MCHC RBC AUTO-ENTMCNC: 33.7 G/DL (ref 30–36.5)
MCV RBC AUTO: 87.8 FL (ref 80–99)
MONOCYTES # BLD: 0.6 K/UL (ref 0–1)
MONOCYTES NFR BLD: 6 % (ref 5–13)
NEUTS SEG # BLD: 8.1 K/UL (ref 1.8–8)
NEUTS SEG NFR BLD: 81 % (ref 32–75)
NRBC # BLD: 0 K/UL (ref 0–0.01)
NRBC BLD-RTO: 0 PER 100 WBC
OSMOLALITY UR: 224 MOSM/KG H2O
PHOSPHATE SERPL-MCNC: 1.6 MG/DL (ref 2.6–4.7)
PLATELET # BLD AUTO: 180 K/UL (ref 150–400)
PMV BLD AUTO: 10.3 FL (ref 8.9–12.9)
POTASSIUM SERPL-SCNC: 3.8 MMOL/L (ref 3.5–5.1)
POTASSIUM SERPL-SCNC: 3.8 MMOL/L (ref 3.5–5.1)
PROCALCITONIN SERPL-MCNC: 0.39 NG/ML
PROT SERPL-MCNC: 5.5 G/DL (ref 6.4–8.2)
PROTHROMBIN TIME: 15.2 SEC (ref 11.9–14.7)
RBC # BLD AUTO: 3.45 M/UL (ref 3.8–5.2)
SODIUM SERPL-SCNC: 140 MMOL/L (ref 136–145)
SODIUM SERPL-SCNC: 140 MMOL/L (ref 136–145)
SPECIAL REQUESTS,SREQ: ABNORMAL
SPECIAL REQUESTS,SREQ: NORMAL
WBC # BLD AUTO: 10 K/UL (ref 3.6–11)

## 2021-07-30 PROCEDURE — 80053 COMPREHEN METABOLIC PANEL: CPT

## 2021-07-30 PROCEDURE — 83735 ASSAY OF MAGNESIUM: CPT

## 2021-07-30 PROCEDURE — 74011250637 HC RX REV CODE- 250/637: Performed by: NURSE PRACTITIONER

## 2021-07-30 PROCEDURE — 65270000029 HC RM PRIVATE

## 2021-07-30 PROCEDURE — 84145 PROCALCITONIN (PCT): CPT

## 2021-07-30 PROCEDURE — 80069 RENAL FUNCTION PANEL: CPT

## 2021-07-30 PROCEDURE — 86140 C-REACTIVE PROTEIN: CPT

## 2021-07-30 PROCEDURE — 36415 COLL VENOUS BLD VENIPUNCTURE: CPT

## 2021-07-30 PROCEDURE — 74011250636 HC RX REV CODE- 250/636: Performed by: INTERNAL MEDICINE

## 2021-07-30 PROCEDURE — 85025 COMPLETE CBC W/AUTO DIFF WBC: CPT

## 2021-07-30 PROCEDURE — 74011250637 HC RX REV CODE- 250/637: Performed by: INTERNAL MEDICINE

## 2021-07-30 PROCEDURE — 85610 PROTHROMBIN TIME: CPT

## 2021-07-30 PROCEDURE — 74011000250 HC RX REV CODE- 250: Performed by: INTERNAL MEDICINE

## 2021-07-30 PROCEDURE — 99232 SBSQ HOSP IP/OBS MODERATE 35: CPT | Performed by: COLON & RECTAL SURGERY

## 2021-07-30 PROCEDURE — 99233 SBSQ HOSP IP/OBS HIGH 50: CPT | Performed by: INTERNAL MEDICINE

## 2021-07-30 RX ORDER — METOCLOPRAMIDE HYDROCHLORIDE 5 MG/ML
10 INJECTION INTRAMUSCULAR; INTRAVENOUS
Status: DISCONTINUED | OUTPATIENT
Start: 2021-07-30 | End: 2021-08-02 | Stop reason: HOSPADM

## 2021-07-30 RX ORDER — WARFARIN SODIUM 5 MG/1
5 TABLET ORAL ONCE
Status: COMPLETED | OUTPATIENT
Start: 2021-07-30 | End: 2021-07-30

## 2021-07-30 RX ORDER — QUETIAPINE FUMARATE 25 MG/1
12.5 TABLET, FILM COATED ORAL
Status: DISCONTINUED | OUTPATIENT
Start: 2021-07-30 | End: 2021-07-31

## 2021-07-30 RX ADMIN — WARFARIN SODIUM 5 MG: 5 TABLET ORAL at 21:00

## 2021-07-30 RX ADMIN — Medication 1 TABLET: at 02:26

## 2021-07-30 RX ADMIN — DILTIAZEM HYDROCHLORIDE 30 MG: 30 TABLET, FILM COATED ORAL at 02:26

## 2021-07-30 RX ADMIN — Medication 10 ML: at 22:57

## 2021-07-30 RX ADMIN — FAMOTIDINE 20 MG: 20 TABLET ORAL at 02:26

## 2021-07-30 RX ADMIN — HEPARIN SODIUM 5000 UNITS: 5000 INJECTION INTRAVENOUS; SUBCUTANEOUS at 23:01

## 2021-07-30 RX ADMIN — MEROPENEM 1 G: 1 INJECTION, POWDER, FOR SOLUTION INTRAVENOUS at 12:58

## 2021-07-30 RX ADMIN — MEROPENEM 1 G: 1 INJECTION, POWDER, FOR SOLUTION INTRAVENOUS at 22:57

## 2021-07-30 RX ADMIN — HEPARIN SODIUM 5000 UNITS: 5000 INJECTION INTRAVENOUS; SUBCUTANEOUS at 12:58

## 2021-07-30 NOTE — PROGRESS NOTES
Progress Note    Patient: Vicky Sesay MRN: [id number]  SSN: [id number]    YOB: 1933  Age: 80 y.o. Sex: female      Admit Date: 7/25/2021    LOS: 5 days     Subjective:   Patient followed septic shock with suspected UTI and  cholecystitis. Blood cultures negative. Urine and bile fluid showing Gram negative rods. She is afebrile with decreasing WBC, procal and CRP. Currently on Meropenem alone. Patient resting comfortably with multiple family members at the bedside. No complaints offered. Objective:     Vitals:    07/30/21 0300 07/30/21 0400 07/30/21 0500 07/30/21 0700   BP: 112/75 137/63 (!) 145/61    Pulse: (!) 105 83 67    Resp: 24 20 19    Temp:    98.3 °F (36.8 °C)   SpO2: 92% 96% 95%    Weight:       Height:            Intake and Output:  Current Shift: No intake/output data recorded. Last three shifts: 07/28 1901 - 07/30 0700  In: 30   Out: 5430 [Urine:5250; Drains:180]    Physical Exam:   Vitals (Levophed) and nursing note reviewed. Constitutional:       Appearance: She is ill-appearing. HENT: unremarkable   Eyes:      Pupils: Pupils are equal, round, and reactive to light. Cardiovascular:      Rate and Rhythm: Regular rhythm. Tachycardia present. Heart sounds: No murmur heard. Pulmonary: clear bilaterally   Abdominal:      General: Bowel sounds are normal.      Palpations: Abdomen is soft. Tenderness: There is no abdominal tenderness   Genitourinary:     Comments: [place] catheter  Musculoskeletal:      Cervical back: Neck supple. Right lower leg: No edema. Left lower leg: No edema. Skin:     Findings: No rash. Neurological: nonfocal, no confusion  Psychiatric: normal behavior     Lab/Data Review:     WBC 11,900    Procal 0.84 <1.09 <1.16  CRP 28.60 <27.80 <29.60     Blood culture (7/26) No growth 3 days  Urine culture (7/26) >100,000 cfu/ml E. Coli PAN-sensitive  Body fluid, bile culture (7/27) E.  Coli RAMSEY-sensitive    Assessment:     Active Problems:    Unstable angina (HCC) (7/25/2021)      Chest pain with moderate risk for cardiac etiology (7/25/2021)    1. Sepsis with septic shock, manifested by fever, hypotension requiring vasopressors, leukocytosis, elevated procal and CRP, improving except CRP  2. Complicated UTI (right nephrolithiasis) with pyuria and bacteriuria, secondary to Gram negative rods, Day #4 IV Meropenem  3. ?Acute cholecystitis (LFTs normal), status post cholecystostomy tube placement, culture pending, Day #4 IV Meropenem  4. Chronic kidney disease  5. Penicillin allergy     Comment:  Both urine and bile fluid grew very sensitive E. Coli and allows us to de-escalate to Levaquin, however, patient was started today on Seroquel which could interact with Levaquin to cause QT prolongation. Plan:   1. Continue IV Meropenem  2. If Seroquel can be discontinued or changed, Levaquin 750 mg IV or oral daily would be reasonable (for another 10 days)  3. Follow-up blood cultures  4.  In am, repeat CBC, procal and CRP     Signed By: Mona Mims MD     July 30, 2021

## 2021-07-30 NOTE — PROGRESS NOTES
Hospitalist Progress Note    Subjective:   Daily Progress Note: 7/30/2021 8:49 AM    Hospital Course:  Alysia Judd is a 80 y.o. female with a  past medical history of HTN, CAD, and atrial fibrillation, who presented to the ED with sharp, substernal chest pain and associated nausea and found to be in atrial fibrillation with RVR. Marlon Sen Patient additionally reported experiencing vaginal bleeding while on Eliquis and now on warfarin, she has a gynecology appointment scheduled for evaluation. Given ASA, pain medication and nitroglycerin with relief. Patient has leukocytosis of unclear etiology with an increasing white blood cell count since admission. Tenderness in the right upper quadrant as well as left upper quadrant. CT scan of the abdomen pelvis pending. Right upper quadrant ultrasound pending. Prophylactically will treat with Rocephin. Urine culture and blood culture pending. INR subtherapeutic at 1.4, BNP 4475. x4 Troponins negative. EKG showed afib. CXR showed central pulmonary vascular prominence and coarse interstitial opacities. Chest pain resolved. Cardiology consulted.  Echo revealed LVEF 65%, dilated left atrium, overnight patient became increasingly hypotensive, was transferred to the ICU, central line placed, obtained CT abdomen pelvis showing biliary sludge, urinalysis consistent with urinary tract infection, patient now s/p cholecystostomy tube, doing well on Meropenem, atrial fibrillation with RVR has resolved, patient had an episode of agitation overnight, likely secondary to sundowning versus ICU delirium, up for stepdown    Subjective:   Patient seen and evaluated at bedside, patient currently has no active complaints, discussed with RN  Current Facility-Administered Medications   Medication Dose Route Frequency    meropenem (MERREM) 1 g in sterile water (preservative free) 20 mL IV syringe  1 g IntraVENous Q8H    heparin (porcine) injection 5,000 Units  5,000 Units SubCUTAneous Q12H    dilTIAZem IR (CARDIZEM) tablet 30 mg  30 mg Oral Q6H    LORazepam (ATIVAN) injection 0.5 mg  0.5 mg IntraMUSCular Q6H PRN    LORazepam (ATIVAN) injection 1 mg  1 mg IntraMUSCular Q6H PRN    metoclopramide HCl (REGLAN) injection 10 mg  10 mg IntraVENous Q6H    NOREPINephrine (LEVOPHED) 8 mg in 5% dextrose 250mL (32 mcg/mL) infusion  0.5-30 mcg/min IntraVENous TITRATE    promethazine (PHENERGAN) tablet 25 mg  25 mg Oral Q4H PRN    DOBUTamine (DOBUTREX) 500 mg/250 mL (2,000 mcg/mL) infusion  0-10 mcg/kg/min IntraVENous TITRATE    sodium chloride (NS) flush 5-40 mL  5-40 mL IntraVENous Q8H    sodium chloride (NS) flush 5-40 mL  5-40 mL IntraVENous PRN    acetaminophen (TYLENOL) tablet 650 mg  650 mg Oral Q6H PRN    Or    acetaminophen (TYLENOL) suppository 650 mg  650 mg Rectal Q6H PRN    polyethylene glycol (MIRALAX) packet 17 g  17 g Oral DAILY PRN    bisacodyL (DULCOLAX) tablet 5 mg  5 mg Oral DAILY PRN    famotidine (PEPCID) tablet 20 mg  20 mg Oral BID    atorvastatin (LIPITOR) tablet 20 mg  20 mg Oral DAILY    calcium-vitamin D 600 mg(1,500mg) -200 unit per tablet 1 Tablet  1 Tablet Oral BID    cholecalciferol (VITAMIN D3) (1000 Units /25 mcg) tablet 2,000 Units  2,000 Units Oral DAILY    isosorbide mononitrate ER (IMDUR) tablet 30 mg  30 mg Oral DAILY    levothyroxine (SYNTHROID) tablet 75 mcg  75 mcg Oral ACB    [Held by provider] metoprolol succinate (TOPROL-XL) XL tablet 50 mg  50 mg Oral DAILY    minoxidiL (LONITEN) tablet 5 mg  5 mg Oral DAILY        Review of Systems  Review of Systems   Constitutional: Negative for chills, fever and weight loss. HENT: Negative. Eyes: Negative. Respiratory: Negative for cough, sputum production, shortness of breath and wheezing. Cardiovascular: Negative for chest pain, palpitations, orthopnea and leg swelling. Gastrointestinal: Positive for abdominal pain (minimal, epigastric and RUQ) and nausea.  Negative for constipation, diarrhea and vomiting. Genitourinary: Negative for dysuria, frequency and urgency. Musculoskeletal: Negative. Neurological: Negative. Objective:     Visit Vitals  BP (!) 145/61 (BP 1 Location: Left upper arm, BP Patient Position: At rest)   Pulse 67   Temp 98.3 °F (36.8 °C)   Resp 19   Ht 5' 4\" (1.626 m)   Wt 85.3 kg (188 lb)   SpO2 95%   Breastfeeding No   BMI 32.27 kg/m²    O2 Flow Rate (L/min): 2 l/min O2 Device: Nasal cannula    Temp (24hrs), Av.2 °F (36.8 °C), Min:98 °F (36.7 °C), Max:98.3 °F (36.8 °C)      No intake/output data recorded.  1901 -  0700  In: 30   Out: 5430 [Urine:5250; Drains:180]    Recent Results (from the past 24 hour(s))   PROTHROMBIN TIME + INR    Collection Time: 21  9:28 AM   Result Value Ref Range    Prothrombin time 18.3 (H) 11.9 - 14.7 sec    INR 1.6 (H) 0.9 - 1.1     SODIUM, UR, RANDOM    Collection Time: 21  6:20 PM   Result Value Ref Range    Sodium,urine random 29 mmol/L        XR CHEST PORT   Final Result      XR CHEST PORT   Final Result      IR CHOLECYSTOSTOMY PERCUTANEOUS   Final Result   Successful ultrasound and fluoroscopic guided aspiration and   percutaneous cholecystostomy tube placement. CT ABD PELV WO CONT   Final Result   Findings are most suggestive of cholecystitis given the stranding   and fluid centered in the region of the gallbladder. Duodenitis and pancreatitis   might be considered in the differential given the location. Nonobstructing right   nephrolithiasis, bilateral renal cysts. Findings in the lung bases suggestive of   CHF/pulmonary edema with bilateral effusions and compressive bilateral lower   lobe atelectasis. Three-vessel coronary arterial calcifications. Diffuse   aortoiliac arteriosclerotic calcifications.          XR CHEST PORT   Final Result      US RUQ   Final Result   Gallbladder sludge, without demonstrated stones      XR CHEST PORT   Final Result      XR CHEST PORT   Final Result   Central pulmonary vascular prominence likely accounting for the fullness in the   beatrice. There are also likely coarsened interstitial opacities that could   represent early interstitial edema or other interstitial process. No focal   airspace consolidation is evident. PHYSICAL EXAM:    Physical Exam  Vitals reviewed. Constitutional:       General: She is not in acute distress. Appearance: She is not ill-appearing or diaphoretic. HENT:      Head: Normocephalic and atraumatic. Mouth/Throat:      Mouth: Mucous membranes are moist.      Pharynx: Oropharynx is clear. Eyes:      Conjunctiva/sclera: Conjunctivae normal.      Pupils: Pupils are equal, round, and reactive to light. Cardiovascular:      Rate and Rhythm: Normal rate and regular rhythm. Pulses: Normal pulses. Heart sounds: Normal heart sounds. Pulmonary:      Effort: Pulmonary effort is normal.      Breath sounds: Normal breath sounds. No wheezing, rhonchi or rales. Chest:      Chest wall: No tenderness. Abdominal:      General: Abdomen is flat. There is no distension. Palpations: Abdomen is soft. There is no mass. Tenderness: There is abdominal tenderness (epigastric, moderate to palpation). Musculoskeletal:         General: No tenderness or deformity. Normal range of motion. Skin:     General: Skin is warm. Neurological:      General: No focal deficit present. Mental Status: She is alert and oriented to person, place, and time.        Data Review    Recent Results (from the past 24 hour(s))   PROTHROMBIN TIME + INR    Collection Time: 07/29/21  9:28 AM   Result Value Ref Range    Prothrombin time 18.3 (H) 11.9 - 14.7 sec    INR 1.6 (H) 0.9 - 1.1     SODIUM, UR, RANDOM    Collection Time: 07/29/21  6:20 PM   Result Value Ref Range    Sodium,urine random 29 mmol/L        Assessment/Plan:     Active Problems:    Unstable angina ([address] (7/25/2021)      Chest pain with moderate risk for cardiac etiology (7/25/2021)      Impression:     1. Chest pain r/o ACS  2. CHF  3. A-fib  4. CAD  5. Hypertension  6. Hyperlipidemia  7. Hypothyroidism  8. Leukocytosis  9. Hypokalemia  10.  Vaginal bleeding     Plan:    Septic shock secondary to cholecystitisof note patient found to be in septic shock yesterday not responding to fluid resuscitation, currently now weaned off pressors based on patient's clinical presentation multifactorial including secondary to cholecystitis based on CT abdomen pelvis results versus urinary tract infection, currently s/p cholecystostomy tube placement, doing well, currently off of pressors  Follow-up blood cultures  Continue Meropenem for antibiotic coverage  General surgery consult appreciated  Infectious Disease consult appreciated    Urinary tract infectionpatient presents with septic shock with likely component of urinary tract infection given urinalysis  Follow blood cultures  Weaned off pressors  Follow-up urine culture  Continue Meropenem for antibiotic coverage    Acute decompensated heart failure with preserved ejection fractionpatient currently appears to be euvolemic at this time  Frequent intake and output monitoring  Maintain euvolemic status  Cardiology consult appreciated    Hypomagnesemiaresolved    Hypokalemiaresolved    Acute kidney injurycurrently resolved  Continue to trend serum creatinine  Nephrology consult appreciated    Atrial fibrillation with RVRcurrently resolved  Continue telemetry monitoring  Continue cardizem PO Q6h  Start Coumadin for [facility]  Follow cardiology recommendations    Hypertensionholding home antihypertensive medications    Vaginal bleedingof note patient found to have vaginal bleeding, status post total abdominal hysterectomy due to endometrial cancer, currently improved  Follow gynecology recommendations    ProphylaxisSCDs  FENregular diet, replete potassium and magnesium  DNR, patient surrogate decision-maker and power of  is daughter E-bethc (updated at bedside)  Disposition -transfer to telemetry out of ICU    Critical care time spent 45 minutes involving direct patient care as well as reviewing patient's labs and coordination of care with nursing staff

## 2021-07-30 NOTE — PROGRESS NOTES
CARDIOLOGY PROGRESS NOTE     Patient seen and examined. This is a patient who is followed for chest pain. Patient has a history of hypertension, coronary artery disease, and atrial fibrillation. Patient is sleeping this am, received Ativan for agitation. Per nursing patient has removed her IV access overnight and was combative. No other complaints reported. Telemetry reviewed, there were no events noted in the past 24 hours. Pertinent review of systems items noted above, all other systems are negative. Current medications reviewed. Physical Examination  Vital signs are stable. Blood pressure 145/61 , Pulse 67  No apparent distress. Heart is regular, rate and rhythm. Normal S1, S2, no murmurs are appreciated. Lungs are clear bilaterally. Abdomen is soft, nontender, normal bowel sounds. Extremities have no edema. Labs reviewed. Labs pending. 7/27/21 - 2- D echo:   · LV: Calculated LVEF is 65%. Normal cavity size, wall thickness, systolic function (ejection fraction normal) and diastolic function. Wall motion: normal. Normal left ventricular strain. · LA: Dilated left atrium. · TV: Mild to moderate tricuspid valve regurgitation is present. Diastolic function if indeterminate given presence of atrial fibrillation. Given age and dilated left atrium, likely has some degree of diastolic dysfunction. Case discussed with Dr. Em Sanchez and our impression and recommendations are as follows:    1. Chest pain: No active chest pain. Troponins are negative x 4. Serial EKGs are nonischemic. ACS ruled out. Echocardiogram with preserved EF and no wall motion abnormalities. Continue telemetry monitoring. Isosorbide on hold due to low BP. Continue dobutamine. 2. Atrial fibrillation: Continue amio gtt will transition to PO. Her CHADS2-VASc score is 4; will transition from heparin to coumadin. Continue telemetry monitoring. Keep serum potassium between 4-5 and serum magnesium > 2.  Continue PO Cardizem and titrate as needed. 3. Sinus tachycardia: compensatory due to sepsis, cholecystitis. Hold all beta blockers    4. Hypotension: blood pressure appears close to goal. No longer requiring pressor support. 5. Acute cholecystis: surgery on the case. Given tachycardia and negative trops x4, with normal TTE, no further risk stratification needed prior to procedure/surgery. Please do not hesitate to call me or Dr. Gabriel Gonzalez if additional questions arise. Seen in conjunction with Eran Lyle NP.  Agree with above    Yuridia Robert MD, Kiah Sanchez, [address]  Structural Heart Disease  Endovascular and Vascular Medicine  Interventional Cardiology  [facility]  [phone]

## 2021-07-30 NOTE — PROGRESS NOTES
Transfer orders received to room 467. Report called to [facility] RN. Patient transpoorted on telemetry box with 3L NC. Family notified of patient transfer. All belongings accounted for including dentures, and glasses.

## 2021-07-30 NOTE — PROGRESS NOTES
Patient became extremely agitated and confused. She ripped out her midline, took off nasal cannula, ripped off leads and refused all care from nursing staff. POA was notified and she came to sit at the bedside. The patient continued to be combative and agitated despite family at the bedside. Reasoner was notified and he gave orders for IM ativan, vistaril and bilateral wrist restraints. Will continue to monitor.

## 2021-07-30 NOTE — PROGRESS NOTES
PT reassessment attempted . However,  per nursing patient was agitated night prior and remains groggy from medication administered at that time. Requesting to hold therapy at this time . Will continue to follow patient and attempt PT reassessment  at a later time.  Thank you

## 2021-07-30 NOTE — PROGRESS NOTES
CM reviewed chart. Patient will discharge to [facility] when medically stable. CM confirmed with Victor Manuel Aly from [facility] that updated PT/OT notes are not required when ready to discharge.

## 2021-07-30 NOTE — PROGRESS NOTES
OT eval order received and acknowledged. OT eval attempted at 2:53 pm however per nursing patient was agitated night prior and remains groggy from medication administered at that time. Requesting therapy holds eval at this time and attempts again tomorrow as able. Will continue to follow patient and attempt OT eval at a later time. Thank you.

## 2021-07-30 NOTE — PROGRESS NOTES
Pharmacist Note - Warfarin Dosing  Consult provided for this 80 y. o.female to manage warfarin for Atrial Fibrillation    INR Goal: 2 - 3    Home regimen/ tablet size: 5 mg daily    Drugs that may increase INR: Amiodarone  Drugs that may decrease INR: None  Other current anticoagulants/ drugs that may increase bleeding risk: Heparin  Risk factors: Age > 65  Daily INR ordered: YES    Recent Labs     07/29/21  0928 07/29/21  0420 07/28/21  0535   HGB  --  8.9* 8.8*   INR 1.6*  --   --      Date               INR                  Dose  7/30   1.2     5 mg                                                                                Assessment/ Plan: Will order warfarin 5 mg PO x 1 dose. Pharmacy will continue to monitor daily and adjust therapy as indicated.

## 2021-07-30 NOTE — PROGRESS NOTES
Nephrology Consult    Patient: Maynor Arriaga MRN: [id number]  SSN: [id number]    YOB: 1933  Age: 80 y.o.   Sex: female      Subjective:   Pt seen in ICU  Cr 1.0  Na 128  Labs are pending today  Off IVF    Past Medical History:   Diagnosis Date    [facility] [address]     CAD (coronary artery disease)     Diabetes mellitus ([facility] [address]     resolved    Endometrial cancer ([facility] [address]     s/p TAHBSO, RT in 1990s    Hypertension     Skin cancer      Past Surgical History:   Procedure Laterality Date    HX HYSTERECTOMY      IR CHOLECYSTOSTOMY PERCUTANEOUS  7/27/2021      Family History   Problem Relation Age of Onset    Hypertension Father     Cancer Father     Hypertension Brother     Ovarian Cancer Other         daughter, unsure if genetic testing performed     Social History     Tobacco Use    Smoking status: Former Smoker    Smokeless tobacco: Never Used    Tobacco comment: quit >50 yrs ago   Substance Use Topics    Alcohol use: Not on file      Current Facility-Administered Medications   Medication Dose Route Frequency Provider Last Rate Last Admin    QUEtiapine (SEROquel) tablet 12.5 mg  12.5 mg Oral DAILY PRN Willard Rosario MD        metoclopramide HCl (REGLAN) injection 10 mg  10 mg IntraVENous Q6H PRN Willard Rosario MD        meropenem (MERREM) 1 g in sterile water (preservative free) 20 mL IV syringe  1 g IntraVENous Q8H Anup Bower MD   1 g at 07/29/21 1955    heparin (porcine) injection 5,000 Units  5,000 Units SubCUTAneous Q12H Libia Bower MD   5,000 Units at 07/29/21 2200    dilTIAZem IR (CARDIZEM) tablet 30 mg  30 mg Oral Q6H [facility] Pedraza, NP   30 mg at 07/30/21 0226    LORazepam (ATIVAN) injection 0.5 mg  0.5 mg IntraMUSCular Q6H PRN Reasoner, Velma Gilford, PA-C        LORazepam (ATIVAN) injection 1 mg  1 mg IntraMUSCular Q6H PRN Janes Carey PA-C   2 mg at 07/29/21 2316    NOREPINephrine (LEVOPHED) 8 mg in 5% dextrose 250mL (32 mcg/mL) infusion  0.5-30 mcg/min IntraVENous TITRATE Carmen Allen MD   Stopped at 07/28/21 0500    promethazine (PHENERGAN) tablet 25 mg  25 mg Oral Q4H PRN Cipriano Gonzalez MD   25 mg at 07/28/21 0518    DOBUTamine (DOBUTREX) 500 mg/250 mL (2,000 mcg/mL) infusion  0-10 mcg/kg/min IntraVENous TITRATE Asha Underwood MD 2.6 mL/hr at 07/29/21 0146 1 mcg/kg/min at 07/29/21 0146    sodium chloride (NS) flush 5-40 mL  5-40 mL IntraVENous Q8H Yeimi Garg NP   10 mL at 07/29/21 1400    sodium chloride (NS) flush 5-40 mL  5-40 mL IntraVENous PRN Yeimi Garg NP        acetaminophen (TYLENOL) tablet 650 mg  650 mg Oral Q6H PRN Yeimi Garg NP   650 mg at 07/29/21 0049    Or    acetaminophen (TYLENOL) suppository 650 mg  650 mg Rectal Q6H PRN Yeimi Garg NP        polyethylene glycol (MIRALAX) packet 17 g  17 g Oral DAILY PRN Yeimi Garg NP        bisacodyL (DULCOLAX) tablet 5 mg  5 mg Oral DAILY PRN Yeimi Garg NP        famotidine (PEPCID) tablet 20 mg  20 mg Oral BID Yeimi Garg NP   20 mg at 07/30/21 9946    atorvastatin (LIPITOR) tablet 20 mg  20 mg Oral DAILY Yeimi Garg NP   20 mg at 07/29/21 0920    calcium-vitamin D 600 mg(1,500mg) -200 unit per tablet 1 Tablet  1 Tablet Oral BID Yeimi Garg NP   1 Tablet at 07/30/21 0226    cholecalciferol (VITAMIN D3) (1000 Units /25 mcg) tablet 2,000 Units  2,000 Units Oral DAILY Yeimi Garg NP   2,000 Units at 07/29/21 0920    isosorbide mononitrate ER (IMDUR) tablet 30 mg  30 mg Oral DAILY Yeimi Garg NP   30 mg at 07/26/21 0273    levothyroxine (SYNTHROID) tablet 75 mcg  75 mcg Oral ACB Yeimi Garg NP   75 mcg at 07/29/21 0730    [Held by provider] metoprolol succinate (TOPROL-XL) XL tablet 50 mg  50 mg Oral DAILY Yeimi Garg NP   50 mg at 07/26/21 0809    minoxidiL (LONITEN) tablet 5 mg  5 mg Oral DAILY Yeimi Garg NP   5 mg at 07/26/21 4993        Allergies   Allergen Reactions    Penicillins Unknown (comments)       Review of Systems:  A comprehensive review of systems was negative except for that written in the History of Present Illness. Objective:     Vitals:    07/30/21 0300 07/30/21 0400 07/30/21 0500 07/30/21 0700   BP: 112/75 137/63 (!) 145/61    Pulse: (!) 105 83 67    Resp: 24 20 19    Temp:    98.3 °F (36.8 °C)   SpO2: 92% 96% 95%    Weight:       Height:            Physical Exam:  General: NAD  Eyes: sclera anicteric  Oral Cavity: No thrush or ulcers  Neck: no JVD  Chest: Fair bilateral air entry  Heart: normal sounds  Abdomen: soft and  tender +  :  [place]+  Lower Extremities: no edema  Skin: no rash  Neuro: intact  Psychiatric: non-depressed            Assessment:     Hospital Problems  Never Reviewed        Codes Class Noted POA    Unstable angina ([facility] [address] ICD-10-CM: I20.0  ICD-9-CM: 411.1  7/25/2021 Unknown        Chest pain with moderate risk for cardiac etiology ICD-10-CM: R07.9  ICD-9-CM: 786.50  7/25/2021 Unknown              Plan:   1 acute kidney injury:  -2/2 prerenal azotemia from sepsis/hypotension plus on diuretics and lisinopril.  -On admission creatinine was 0.9 and has bumped to 1.8 in the last 24 hours.   -Cr has improved to 1.0   -Clinically no evidence of volume overload. Chest x-ray is clear. -2D echocardiogram showed preserved LVEF. -Agree to hold lisinopril diuretics and nitrates. -off  IV fluids.    -No evidence of hydronephrosis per CT of the abdomen. 2.  Hypokalemia.    -From low p.o. intake/hypomagnesemia. -K was 3.2 and magnesium 1.3.    -replaced with K and IV magnesium sulfate.  -K 3.6, Mg 2.0    3. Septic shock:    -Admitted with right upper quadrant pain, WBC 21K, hypotensive. -CT abdomen showed distended gallbladder and fluid around diagnosed with acute cholecystitis s/p cholecystotomy   -GNR in urine and biliary culture, on iv meropenem  -On IV antibiotics.   Off pressor. 4. anemia.    -Hemoglobin 8.9.   - I will send iron studies and ferritin level      5. A. fib. With RVR. on IV amiodarone drip. 6. DVT prophylaxis:  -changed lovenox to unfractionated sq hep    7.  Hyponatremia:  -Na 128  -U Na 29  -pending BMP today       Signed By: Luz Blanco MD     July 30, 2021

## 2021-07-30 NOTE — PROGRESS NOTES
Progress Note    Patient: Zelalem Tijerina MRN: [id number]  SSN: [id number]    YOB: 1933  Age: 80 y.o. Sex: female      Admit Date: 7/25/2021    LOS: 5 days     Subjective:   Patient seen in bed, resting quietly  No abdominal pain  Had agitation overnight    Objective:     Vitals:    07/30/21 0300 07/30/21 0400 07/30/21 0500 07/30/21 0700   BP: 112/75 137/63 (!) 145/61    Pulse: (!) 105 83 67    Resp: 24 20 19    Temp:    98.3 °F (36.8 °C)   SpO2: 92% 96% 95%    Weight:       Height:            Intake and Output:  Current Shift: No intake/output data recorded. Last three shifts: 07/28 1901 - 07/30 0700  In: 30   Out: 5430 [Urine:5250; Drains:180]    Review of Systems:  ROS     Physical Exam:   Abdomen is soft, nontender, nondistended, percutaneous cholecystostomy tube draining    Lab/Data Review:  Recent Results (from the past 24 hour(s))   PROTHROMBIN TIME + INR    Collection Time: 07/29/21  9:28 AM   Result Value Ref Range    Prothrombin time 18.3 (H) 11.9 - 14.7 sec    INR 1.6 (H) 0.9 - 1.1     SODIUM, UR, RANDOM    Collection Time: 07/29/21  6:20 PM   Result Value Ref Range    Sodium,urine random 29 mmol/L      A.m. labs have not been drawn yet    Assessment:     Active Problems:    Unstable angina ([address] (7/25/2021)      Chest pain with moderate risk for cardiac etiology (7/25/2021)        Plan:   Patient had significant agitation and confusion likely secondary to ICU delirium. I believe the patient is ready for discharge. She has been accepted at [facility]. Patient to be discharged with a cholecystostomy tube which will be removed in 4 to 6 weeks. Both urine culture and biliary culture positive for gram-negative rods. I do not see any speciation yet. Patient is currently on meropenem, will likely discharge home on oral Levaquin. If patient is discharged to rehab today she can follow-up in my office in 2 weeks.     Signed By: Sandeep Lopez MD     July 30, 2021

## 2021-07-31 LAB
ALBUMIN SERPL-MCNC: 2.4 G/DL (ref 3.5–5)
ALBUMIN/GLOB SERPL: 0.6 {RATIO} (ref 1.1–2.2)
ALP SERPL-CCNC: 101 U/L (ref 45–117)
ALT SERPL-CCNC: 46 U/L (ref 12–78)
ANION GAP SERPL CALC-SCNC: 4 MMOL/L (ref 5–15)
AST SERPL W P-5'-P-CCNC: 32 U/L (ref 15–37)
BASOPHILS # BLD: 0 K/UL (ref 0–0.1)
BASOPHILS NFR BLD: 0 % (ref 0–1)
BILIRUB SERPL-MCNC: 1.1 MG/DL (ref 0.2–1)
BUN SERPL-MCNC: 17 MG/DL (ref 6–20)
BUN/CREAT SERPL: 28 (ref 12–20)
CA-I BLD-MCNC: 9.1 MG/DL (ref 8.5–10.1)
CHLORIDE SERPL-SCNC: 106 MMOL/L (ref 97–108)
CO2 SERPL-SCNC: 30 MMOL/L (ref 21–32)
CREAT SERPL-MCNC: 0.61 MG/DL (ref 0.55–1.02)
DIFFERENTIAL METHOD BLD: ABNORMAL
EOSINOPHIL # BLD: 0.2 K/UL (ref 0–0.4)
EOSINOPHIL NFR BLD: 2 % (ref 0–7)
ERYTHROCYTE [DISTWIDTH] IN BLOOD BY AUTOMATED COUNT: 16.9 % (ref 11.5–14.5)
GLOBULIN SER CALC-MCNC: 3.7 G/DL (ref 2–4)
GLUCOSE SERPL-MCNC: 78 MG/DL (ref 65–100)
HCT VFR BLD AUTO: 33 % (ref 35–47)
HGB BLD-MCNC: 11 G/DL (ref 11.5–16)
IMM GRANULOCYTES # BLD AUTO: 0 K/UL
IMM GRANULOCYTES NFR BLD AUTO: 0 %
INR PPP: 1.3 (ref 0.9–1.1)
LYMPHOCYTES # BLD: 1.7 K/UL (ref 0.8–3.5)
LYMPHOCYTES NFR BLD: 16 % (ref 12–49)
MAGNESIUM SERPL-MCNC: 2.1 MG/DL (ref 1.6–2.4)
MCH RBC QN AUTO: 29.5 PG (ref 26–34)
MCHC RBC AUTO-ENTMCNC: 33.3 G/DL (ref 30–36.5)
MCV RBC AUTO: 88.5 FL (ref 80–99)
MONOCYTES # BLD: 0.8 K/UL (ref 0–1)
MONOCYTES NFR BLD: 8 % (ref 5–13)
NEUTS SEG # BLD: 7.7 K/UL (ref 1.8–8)
NEUTS SEG NFR BLD: 74 % (ref 32–75)
NRBC # BLD: 0 K/UL (ref 0–0.01)
NRBC BLD-RTO: 0 PER 100 WBC
PLATELET # BLD AUTO: 246 K/UL (ref 150–400)
PMV BLD AUTO: 9.7 FL (ref 8.9–12.9)
POTASSIUM SERPL-SCNC: 3.8 MMOL/L (ref 3.5–5.1)
PROT SERPL-MCNC: 6.1 G/DL (ref 6.4–8.2)
PROTHROMBIN TIME: 15.4 SEC (ref 11.9–14.7)
RBC # BLD AUTO: 3.73 M/UL (ref 3.8–5.2)
RBC MORPH BLD: ABNORMAL
SODIUM SERPL-SCNC: 140 MMOL/L (ref 136–145)
WBC # BLD AUTO: 10.4 K/UL (ref 3.6–11)

## 2021-07-31 PROCEDURE — 36415 COLL VENOUS BLD VENIPUNCTURE: CPT

## 2021-07-31 PROCEDURE — 83735 ASSAY OF MAGNESIUM: CPT

## 2021-07-31 PROCEDURE — 94760 N-INVAS EAR/PLS OXIMETRY 1: CPT

## 2021-07-31 PROCEDURE — 77010033678 HC OXYGEN DAILY

## 2021-07-31 PROCEDURE — 74011000250 HC RX REV CODE- 250: Performed by: INTERNAL MEDICINE

## 2021-07-31 PROCEDURE — 80053 COMPREHEN METABOLIC PANEL: CPT

## 2021-07-31 PROCEDURE — 74011250636 HC RX REV CODE- 250/636: Performed by: INTERNAL MEDICINE

## 2021-07-31 PROCEDURE — 85610 PROTHROMBIN TIME: CPT

## 2021-07-31 PROCEDURE — 65270000029 HC RM PRIVATE

## 2021-07-31 PROCEDURE — 74011250637 HC RX REV CODE- 250/637: Performed by: INTERNAL MEDICINE

## 2021-07-31 PROCEDURE — 97116 GAIT TRAINING THERAPY: CPT | Performed by: PHYSICAL THERAPIST

## 2021-07-31 PROCEDURE — 74011250637 HC RX REV CODE- 250/637: Performed by: NURSE PRACTITIONER

## 2021-07-31 PROCEDURE — 97530 THERAPEUTIC ACTIVITIES: CPT

## 2021-07-31 PROCEDURE — 85025 COMPLETE CBC W/AUTO DIFF WBC: CPT

## 2021-07-31 PROCEDURE — 97165 OT EVAL LOW COMPLEX 30 MIN: CPT

## 2021-07-31 RX ORDER — DILTIAZEM HYDROCHLORIDE 30 MG/1
30 TABLET, FILM COATED ORAL EVERY 6 HOURS
Qty: 120 TABLET | Refills: 0 | Status: SHIPPED | OUTPATIENT
Start: 2021-07-31

## 2021-07-31 RX ORDER — ACETAMINOPHEN 325 MG/1
650 TABLET ORAL
Qty: 60 TABLET | Refills: 0 | Status: SHIPPED | OUTPATIENT
Start: 2021-07-31

## 2021-07-31 RX ORDER — LEVOFLOXACIN 750 MG/1
750 TABLET ORAL DAILY
Qty: 10 TABLET | Refills: 0 | Status: SHIPPED | OUTPATIENT
Start: 2021-07-31 | End: 2021-09-08 | Stop reason: ALTCHOICE

## 2021-07-31 RX ORDER — WARFARIN SODIUM 5 MG/1
5 TABLET ORAL ONCE
Status: COMPLETED | OUTPATIENT
Start: 2021-07-31 | End: 2021-07-31

## 2021-07-31 RX ADMIN — DILTIAZEM HYDROCHLORIDE 30 MG: 30 TABLET, FILM COATED ORAL at 17:24

## 2021-07-31 RX ADMIN — FAMOTIDINE 20 MG: 20 TABLET ORAL at 08:32

## 2021-07-31 RX ADMIN — ISOSORBIDE MONONITRATE 30 MG: 30 TABLET, EXTENDED RELEASE ORAL at 08:31

## 2021-07-31 RX ADMIN — DILTIAZEM HYDROCHLORIDE 30 MG: 30 TABLET, FILM COATED ORAL at 05:40

## 2021-07-31 RX ADMIN — MEROPENEM 1 G: 1 INJECTION, POWDER, FOR SOLUTION INTRAVENOUS at 05:39

## 2021-07-31 RX ADMIN — FAMOTIDINE 20 MG: 20 TABLET ORAL at 21:09

## 2021-07-31 RX ADMIN — MEROPENEM 1 G: 1 INJECTION, POWDER, FOR SOLUTION INTRAVENOUS at 13:06

## 2021-07-31 RX ADMIN — LEVOTHYROXINE SODIUM 75 MCG: 75 TABLET ORAL at 08:32

## 2021-07-31 RX ADMIN — HEPARIN SODIUM 5000 UNITS: 5000 INJECTION INTRAVENOUS; SUBCUTANEOUS at 08:30

## 2021-07-31 RX ADMIN — WARFARIN SODIUM 5 MG: 5 TABLET ORAL at 17:24

## 2021-07-31 RX ADMIN — HEPARIN SODIUM 5000 UNITS: 5000 INJECTION INTRAVENOUS; SUBCUTANEOUS at 21:10

## 2021-07-31 RX ADMIN — Medication 1 TABLET: at 21:09

## 2021-07-31 RX ADMIN — MINOXIDIL 5 MG: 2.5 TABLET ORAL at 09:00

## 2021-07-31 RX ADMIN — ATORVASTATIN CALCIUM 20 MG: 20 TABLET, FILM COATED ORAL at 08:31

## 2021-07-31 RX ADMIN — Medication 10 ML: at 21:10

## 2021-07-31 RX ADMIN — MEROPENEM 1 G: 1 INJECTION, POWDER, FOR SOLUTION INTRAVENOUS at 21:09

## 2021-07-31 RX ADMIN — Medication 10 ML: at 05:41

## 2021-07-31 RX ADMIN — DILTIAZEM HYDROCHLORIDE 30 MG: 30 TABLET, FILM COATED ORAL at 13:05

## 2021-07-31 RX ADMIN — Medication 1 TABLET: at 08:32

## 2021-07-31 RX ADMIN — Medication 10 ML: at 14:00

## 2021-07-31 RX ADMIN — Medication 10 ML: at 16:00

## 2021-07-31 RX ADMIN — Medication 2000 UNITS: at 09:00

## 2021-07-31 RX ADMIN — DILTIAZEM HYDROCHLORIDE 30 MG: 30 TABLET, FILM COATED ORAL at 23:29

## 2021-07-31 NOTE — PROGRESS NOTES
Problem: Pressure Injury - Risk of  Goal: *Prevention of pressure injury  Description: Document Richard Scale and appropriate interventions in the flowsheet.   7/31/2021 1723 by Zoe Tovar RN  Outcome: Progressing Towards Goal  Note: Pressure Injury Interventions:  Sensory Interventions: Assess changes in LOC, Minimize linen layers, Maintain/enhance activity level, Keep linens dry and wrinkle-free    Moisture Interventions: Absorbent underpads, Check for incontinence Q2 hours and as needed, Internal/External urinary devices, Maintain skin hydration (lotion/cream), Offer toileting Q_hr, Moisture barrier    Activity Interventions: PT/OT evaluation, Increase time out of bed    Mobility Interventions: PT/OT evaluation    Nutrition Interventions: Offer support with meals,snacks and hydration    Friction and Shear Interventions: Apply protective barrier, creams and emollients             7/31/2021 1723 by Zoe Tovar RN  Outcome: Progressing Towards Goal  Note: Pressure Injury Interventions:  Sensory Interventions: Assess changes in LOC, Minimize linen layers, Maintain/enhance activity level, Keep linens dry and wrinkle-free    Moisture Interventions: Absorbent underpads, Check for incontinence Q2 hours and as needed, Internal/External urinary devices, Maintain skin hydration (lotion/cream), Offer toileting Q_hr, Moisture barrier    Activity Interventions: PT/OT evaluation, Increase time out of bed    Mobility Interventions: PT/OT evaluation    Nutrition Interventions: Offer support with meals,snacks and hydration    Friction and Shear Interventions: Apply protective barrier, creams and emollients                Problem: Patient Education: Go to Patient Education Activity  Goal: Patient/Family Education  7/31/2021 1723 by Zoe Tovar RN  Outcome: Progressing Towards Goal  7/31/2021 1723 by Zoe Tovar RN  Outcome: Progressing Towards Goal     Problem: Falls - Risk of  Goal: *Absence of Falls  Description: Document Virginia Levin Fall Risk and appropriate interventions in the flowsheet.   7/31/2021 1723 by Gisele Harrington RN  Outcome: Progressing Towards Goal  Note: Fall Risk Interventions:       Mentation Interventions: Room close to nurse's station, Increase mobility, Eyeglasses and hearing aids, Evaluate medications/consider consulting pharmacy, Door open when patient unattended, Bed/chair exit alarm, Adequate sleep, hydration, pain control, Update white board, Toileting rounds    Medication Interventions: Teach patient to arise slowly, Patient to call before getting OOB, Bed/chair exit alarm    Elimination Interventions: Call light in reach, Bed/chair exit alarm, Patient to call for help with toileting needs    History of Falls Interventions: Room close to nurse's station, Evaluate medications/consider consulting pharmacy, Door open when patient unattended, Bed/chair exit alarm      7/31/2021 1723 by Gisele Harrington RN  Outcome: Progressing Towards Goal  Note: Fall Risk Interventions:       Mentation Interventions: Room close to nurse's station, Increase mobility, Eyeglasses and hearing aids, Evaluate medications/consider consulting pharmacy, Door open when patient unattended, Bed/chair exit alarm, Adequate sleep, hydration, pain control, Update white board, Toileting rounds    Medication Interventions: Teach patient to arise slowly, Patient to call before getting OOB, Bed/chair exit alarm    Elimination Interventions: Call light in reach, Bed/chair exit alarm, Patient to call for help with toileting needs    History of Falls Interventions: Room close to nurse's station, Evaluate medications/consider consulting pharmacy, Door open when patient unattended, Bed/chair exit alarm         Problem: Patient Education: Go to Patient Education Activity  Goal: Patient/Family Education  7/31/2021 1723 by Gisele Harrington RN  Outcome: Progressing Towards Goal  7/31/2021 1723 by Massimo Bay RN  Outcome: Progressing Towards Goal     Problem: Patient Education: Go to Patient Education Activity  Goal: Patient/Family Education  7/31/2021 1723 by Massimo Bay RN  Outcome: Progressing Towards Goal  7/31/2021 1723 by Massimo Bay RN  Outcome: Progressing Towards Goal     Problem: Pain  Goal: *Control of Pain  7/31/2021 1723 by Massimo Bay RN  Outcome: Progressing Towards Goal  7/31/2021 1723 by Massimo Bay RN  Outcome: Progressing Towards Goal     Problem: Patient Education: Go to Patient Education Activity  Goal: Patient/Family Education  7/31/2021 1723 by Massimo Bay RN  Outcome: Progressing Towards Goal  7/31/2021 1723 by Massimo Bay RN  Outcome: Progressing Towards Goal     Problem: Non-Violent Restraints  Goal: Removal from restraints as soon as assessed to be safe  7/31/2021 1723 by Massimo Bay RN  Outcome: Progressing Towards Goal  7/31/2021 1723 by Massimo Bay RN  Outcome: Progressing Towards Goal  Goal: No harm/injury to patient while restraints in use  7/31/2021 1723 by Massimo Bay RN  Outcome: Progressing Towards Goal  7/31/2021 1723 by Msasimo Bay RN  Outcome: Progressing Towards Goal  Goal: Patient's dignity will be maintained  7/31/2021 1723 by Massimo Bay RN  Outcome: Progressing Towards Goal  7/31/2021 1723 by Massimo Bay RN  Outcome: Progressing Towards Goal  Goal: Patient Interventions  7/31/2021 1723 by Massimo Bay RN  Outcome: Progressing Towards Goal  7/31/2021 1723 by Massimo Bay RN  Outcome: Progressing Towards Goal     Problem: Delirium Treatment  Goal: *Level of consciousness restored to baseline  7/31/2021 1723 by Massimo Bay RN  Outcome: Progressing Towards Goal  7/31/2021 1723 by Massimo Bay RN  Outcome: Progressing Towards Goal  Goal: *Level of environmental perceptions restored to baseline  7/31/2021 1723 by Neil Blevins RN  Outcome: Progressing Towards Goal  7/31/2021 1723 by Neil Blevins RN  Outcome: Progressing Towards Goal  Goal: *Sensory perception restored to baseline  7/31/2021 1723 by Neil Blevins RN  Outcome: Progressing Towards Goal  7/31/2021 1723 by Neil Blevins RN  Outcome: Progressing Towards Goal  Goal: *Emotional stability restored to baseline  7/31/2021 1723 by Neil Blevins, RN  Outcome: Progressing Towards Goal  7/31/2021 1723 by Neil Blevins RN  Outcome: Progressing Towards Goal  Goal: *Functional assessment restored to baseline  7/31/2021 1723 by Neil Blevins, RN  Outcome: Progressing Towards Goal  7/31/2021 1723 by Neil Blevins RN  Outcome: Progressing Towards Goal  Goal: *Absence of falls  7/31/2021 1723 by Neil Blevins, RN  Outcome: Progressing Towards Goal  7/31/2021 1723 by Neil Blevins RN  Outcome: Progressing Towards Goal  Goal: *Will remain free of delirium, CAM Score negative  7/31/2021 1723 by Neil Blevins, RN  Outcome: Progressing Towards Goal  7/31/2021 1723 by Neil Blevins RN  Outcome: Progressing Towards Goal  Goal: *Cognitive status will be restored to baseline  7/31/2021 1723 by Neil Blevins, RN  Outcome: Progressing Towards Goal  7/31/2021 1723 by Neil Blevins RN  Outcome: Progressing Towards Goal  Goal: Interventions  7/31/2021 1723 by Neil Blevins, RN  Outcome: Progressing Towards Goal  7/31/2021 1723 by Neil Blevins RN  Outcome: Progressing Towards Goal     Problem: Patient Education: Go to Patient Education Activity  Goal: Patient/Family Education  7/31/2021 1723 by Neil Blevins, RN  Outcome: Progressing Towards Goal  7/31/2021 1723 by Neil Blevins RN  Outcome: Progressing Towards Goal Problem: Patient Education: Go to Patient Education Activity  Goal: Patient/Family Education  7/31/2021 1723 by John Mari RN  Outcome: Progressing Towards Goal  7/31/2021 1723 by John Mari RN  Outcome: Progressing Towards Goal

## 2021-07-31 NOTE — DISCHARGE SUMMARY
Hospitalist Discharge Summary     Patient ID:  Brina Arias  [id number]  80 y.o.  2/16/1933 7/25/2021    PCP on record: Mayra Bean NP    Admit date: 7/25/2021  Discharge date and time: 7/31/2021    DISCHARGE DIAGNOSIS:    Septic shock secondary to cholecystitis/urinary tract infection/acute decompensated heart failure preserved ejection fraction/hypomagnesemia/hypokalemia/acute kidney injury/atrial fibrillation with RVR/hypertension    CONSULTATIONS:  IP CONSULT TO CARDIOLOGY  IP CONSULT TO OB GYN  IP CONSULT TO GASTROENTEROLOGY  IP CONSULT TO INFECTIOUS DISEASES  IP CONSULT TO NEPHROLOGY  IP CONSULT TO GENERAL SURGERY    Excerpted HPI from H&P of Jana Mcdermott MD:  Yenifer Presley a 80 y. o. female with a  past medical history of HTN, CAD, and atrial fibrillation, who presented to the ED with sharp, substernal chest pain and associated nausea and found to be in atrial fibrillation with RVR. [facility] Patient additionally reported experiencing vaginal bleeding while on Eliquis and now on warfarin, she has a gynecology appointment scheduled for evaluation. Given ASA, pain medication and nitroglycerin with relief. Patient has leukocytosis of unclear etiology with an increasing white blood cell count since admission. Tenderness in the right upper quadrant as well as left upper quadrant. CT scan of the abdomen pelvis pending. Right upper quadrant ultrasound pending. Prophylactically will treat with Rocephin. Urine culture and blood culture pending. INR subtherapeutic at 1.4, BNP 4475. x4 Troponins negative. EKG showed afib. CXR showed central pulmonary vascular prominence and coarse interstitial opacities. Chest pain resolved. Cardiology consulted.  Echo revealed LVEF 65%, dilated left atrium, overnight patient became increasingly hypotensive, was transferred to the ICU, central line placed, obtained CT abdomen pelvis showing biliary sludge, urinalysis consistent with urinary tract infection, patient now s/p cholecystostomy tube, doing well on Meropenem, atrial fibrillation with RVR has resolved, patient had an episode of agitation overnight, likely secondary to sundowning versus ICU delirium, up for stepdown    ______________________________________________________________________  DISCHARGE SUMMARY/HOSPITAL COURSE:  for full details see H&P, daily progress notes, labs, consult notes. Patient was subsequently admitted to [facility] for evaluation as well as management, patient was found to have septic shock secondary to cholecystitis, patient was evaluated by cardiology as well as general surgery as well as infectious ease, cholecystostomy tube was placed, patient was on pressors, patient was already on IV antibiotics, patient did well in postoperative period, during. Of septic shock patient was in atrial fibrillation with RVR started on amiodarone drip, subsequently resolved with resolution of sepsis, patient was started on oral Cardizem as per cardiology recommendations, patient was evaluated by physical therapy as well as Occupational Therapy as patient's clinical status overall improved patient was deemed stable for discharge and close outpatient follow-up with primary care physician/general surgery/nephrology/cardiology, the plan was explained to the patient and patient's daughter in detail who are agreeable to current plan.        _______________________________________________________________________  Patient seen and examined by me on discharge day.   Pertinent Findings:  Gen:    Not in distress  Chest: Clear lungs  CVS:   Regular rhythm, s1/s2 no m/r/g  No edema  Abd:  Soft, not distended, not tender  Neuro:  Alert, Oriented x 4  _______________________________________________________________________  DISCHARGE MEDICATIONS:   Current Discharge Medication List      START taking these medications    Details   acetaminophen (TYLENOL) 325 mg tablet Take 2 Tablets by mouth every six (6) hours as needed for Pain or Fever. Qty: 60 Tablet, Refills: 0  Start date: 7/31/2021      dilTIAZem IR (CARDIZEM) 30 mg tablet Take 1 Tablet by mouth every six (6) hours. Qty: 120 Tablet, Refills: 0  Start date: 7/31/2021      levoFLOXacin (Levaquin) 750 mg tablet Take 1 Tablet by mouth daily. Qty: 10 Tablet, Refills: 0  Start date: 7/31/2021         CONTINUE these medications which have NOT CHANGED    Details   warfarin (Coumadin) 5 mg tablet Take 5 mg by mouth daily. atorvastatin (LIPITOR) 20 mg tablet Take 20 mg by mouth daily. niacin (NIASPAN) 1,000 mg Tb24 tab Take 1,000 mg by mouth nightly. minoxidiL (LONITEN) 10 mg tablet Take 5 mg by mouth daily. isosorbide mononitrate ER (IMDUR) 30 mg tablet Take 30 mg by mouth daily. cholecalciferol, vitamin D3, (Vitamin D3) 50 mcg (2,000 unit) tab Take 2,000 Units by mouth. Calcium-Cholecalciferol, D3, 600 mg(1,500mg) -400 unit chew Take 1,500 mg by mouth.      levothyroxine (SYNTHROID) 75 mcg tablet Take 75 mcg by mouth Daily (before breakfast). STOP taking these medications       lisinopriL (PRINIVIL, ZESTRIL) 20 mg tablet Comments:   Reason for Stopping:         metoprolol succinate (TOPROL-XL) 50 mg XL tablet Comments:   Reason for Stopping:         hydroCHLOROthiazide (HYDRODIURIL) 25 mg tablet Comments:   Reason for Stopping:                 Patient Follow Up Instructions: Activity: PT/OT Eval and Treat  Diet: Dysphagia diet-pureed  Wound Care: As directed    Follow-up with PCP/Cardiology/General Surgery in 2 weeks.   Follow-up tests/labs As per above  physicians  Follow-up Information     Follow up With Specialties Details Why Contact Info    Wing Leung NP Nurse Practitioner In 1 week  [address] [facility]  [facility]      Shira Quinonez MD Cardiology In 1 week  [address] [place] Hazel MD Colon and Rectal Surgery, General Surgery In 1 week  [address]  [phone]      Fer Boateng MD Nephrology, Nephrology In 1 week  [facility]  [phone]          ________________________________________________________________    Risk of deterioration: Low    Condition at Discharge:  Stable  __________________________________________________________________    Disposition  SNF/LTC    ____________________________________________________________________    Code Status: Full Code  ___________________________________________________________________      Total time in minutes spent coordinating this discharge (includes going over instructions, follow-up, prescriptions, and preparing report for sign off to her PCP) :  45 minutes    Signed:   Bradley Acevedo MD

## 2021-07-31 NOTE — PROGRESS NOTES
Problem: Pressure Injury - Risk of  Goal: *Prevention of pressure injury  Description: Document Richard Scale and appropriate interventions in the flowsheet.   7/31/2021 1924 by Mkie Jin RN  Outcome: Progressing Towards Goal  Note: Pressure Injury Interventions:  Sensory Interventions: Assess changes in LOC, Float heels, Keep linens dry and wrinkle-free, Minimize linen layers    Moisture Interventions: Absorbent underpads, Minimize layers    Activity Interventions: Pressure redistribution bed/mattress(bed type)    Mobility Interventions: PT/OT evaluation, Float heels    Nutrition Interventions: Offer support with meals,snacks and hydration, Document food/fluid/supplement intake    Friction and Shear Interventions: Minimize layers, Lift sheet             7/31/2021 1723 by Mike Jin RN  Outcome: Progressing Towards Goal  Note: Pressure Injury Interventions:  Sensory Interventions: Assess changes in LOC, Minimize linen layers, Maintain/enhance activity level, Keep linens dry and wrinkle-free    Moisture Interventions: Absorbent underpads, Check for incontinence Q2 hours and as needed, Internal/External urinary devices, Maintain skin hydration (lotion/cream), Offer toileting Q_hr, Moisture barrier    Activity Interventions: PT/OT evaluation, Increase time out of bed    Mobility Interventions: PT/OT evaluation    Nutrition Interventions: Offer support with meals,snacks and hydration    Friction and Shear Interventions: Apply protective barrier, creams and emollients             7/31/2021 1723 by Mike Jin RN  Outcome: Progressing Towards Goal  Note: Pressure Injury Interventions:  Sensory Interventions: Assess changes in LOC, Minimize linen layers, Maintain/enhance activity level, Keep linens dry and wrinkle-free    Moisture Interventions: Absorbent underpads, Check for incontinence Q2 hours and as needed, Internal/External urinary devices, Maintain skin hydration (lotion/cream), Offer toileting Q_hr, Moisture barrier    Activity Interventions: PT/OT evaluation, Increase time out of bed    Mobility Interventions: PT/OT evaluation    Nutrition Interventions: Offer support with meals,snacks and hydration    Friction and Shear Interventions: Apply protective barrier, creams and emollients                Problem: Patient Education: Go to Patient Education Activity  Goal: Patient/Family Education  7/31/2021 1924 by Glen Coleman RN  Outcome: Progressing Towards Goal  7/31/2021 1723 by Glen Coleman RN  Outcome: Progressing Towards Goal  7/31/2021 1723 by Glen Coleman RN  Outcome: Progressing Towards Goal     Problem: Falls - Risk of  Goal: *Absence of Falls  Description: Document Breezy Hutchinson Fall Risk and appropriate interventions in the flowsheet.   7/31/2021 1924 by Glen Coleman RN  Outcome: Progressing Towards Goal  Note: Fall Risk Interventions:  Mobility Interventions: Bed/chair exit alarm, Patient to call before getting OOB    Mentation Interventions: Bed/chair exit alarm    Medication Interventions: Bed/chair exit alarm, Patient to call before getting OOB    Elimination Interventions: Bed/chair exit alarm, Call light in reach, Patient to call for help with toileting needs    History of Falls Interventions: Bed/chair exit alarm      7/31/2021 1723 by Glen Coleman RN  Outcome: Progressing Towards Goal  Note: Fall Risk Interventions:       Mentation Interventions: Room close to nurse's station, Increase mobility, Eyeglasses and hearing aids, Evaluate medications/consider consulting pharmacy, Door open when patient unattended, Bed/chair exit alarm, Adequate sleep, hydration, pain control, Update white board, Toileting rounds    Medication Interventions: Teach patient to arise slowly, Patient to call before getting OOB, Bed/chair exit alarm    Elimination Interventions: Call light in reach, Bed/chair exit alarm, Patient to call for help with toileting needs    History of Falls Interventions: Room close to nurse's station, Evaluate medications/consider consulting pharmacy, Door open when patient unattended, Bed/chair exit alarm      7/31/2021 1723 by Nicho Nation RN  Outcome: Progressing Towards Goal  Note: Fall Risk Interventions:       Mentation Interventions: Room close to nurse's station, Increase mobility, Eyeglasses and hearing aids, Evaluate medications/consider consulting pharmacy, Door open when patient unattended, Bed/chair exit alarm, Adequate sleep, hydration, pain control, Update white board, Toileting rounds    Medication Interventions: Teach patient to arise slowly, Patient to call before getting OOB, Bed/chair exit alarm    Elimination Interventions: Call light in reach, Bed/chair exit alarm, Patient to call for help with toileting needs    History of Falls Interventions: Room close to nurse's station, Evaluate medications/consider consulting pharmacy, Door open when patient unattended, Bed/chair exit alarm         Problem: Patient Education: Go to Patient Education Activity  Goal: Patient/Family Education  7/31/2021 1924 by Nicho Nation RN  Outcome: Progressing Towards Goal  7/31/2021 1723 by Nicho Nation RN  Outcome: Progressing Towards Goal  7/31/2021 1723 by Nicho Nation RN  Outcome: Progressing Towards Goal     Problem: Patient Education: Go to Patient Education Activity  Goal: Patient/Family Education  7/31/2021 1924 by Nicho Nation RN  Outcome: Progressing Towards Goal  7/31/2021 1723 by Nicho Nation RN  Outcome: Progressing Towards Goal  7/31/2021 1723 by Nicho Nation RN  Outcome: Progressing Towards Goal     Problem: Pain  Goal: *Control of Pain  7/31/2021 1924 by Nicho Nation RN  Outcome: Progressing Towards Goal  7/31/2021 1723 by Nicho Nation RN  Outcome: Progressing Towards Goal  7/31/2021 1723 by Tressa Brito RN  Outcome: Progressing Towards Goal     Problem: Patient Education: Go to Patient Education Activity  Goal: Patient/Family Education  7/31/2021 1924 by Tressa Brito RN  Outcome: Progressing Towards Goal  7/31/2021 1723 by Tressa Brito RN  Outcome: Progressing Towards Goal  7/31/2021 1723 by Tressa Brito RN  Outcome: Progressing Towards Goal     Problem: Delirium Treatment  Goal: *Cognitive status will be restored to baseline  7/31/2021 1924 by Tressa Brito RN  Outcome: Progressing Towards Goal  7/31/2021 1723 by Tressa Brito RN  Outcome: Progressing Towards Goal  7/31/2021 1723 by Tressa Brito RN  Outcome: Progressing Towards Goal  Goal: Interventions  7/31/2021 1924 by Tressa Brito RN  Outcome: Progressing Towards Goal  7/31/2021 1723 by Tressa Brito RN  Outcome: Progressing Towards Goal  7/31/2021 1723 by Tressa Brito RN  Outcome: Progressing Towards Goal     Problem: Patient Education: Go to Patient Education Activity  Goal: Patient/Family Education  7/31/2021 1924 by Tressa Brito RN  Outcome: Progressing Towards Goal  7/31/2021 1723 by Tressa Brito RN  Outcome: Progressing Towards Goal  7/31/2021 1723 by Tressa Brito RN  Outcome: Progressing Towards Goal     Problem: Patient Education: Go to Patient Education Activity  Goal: Patient/Family Education  7/31/2021 1924 by Tressa Brito RN  Outcome: Progressing Towards Goal  7/31/2021 1723 by Tressa Brito RN  Outcome: Progressing Towards Goal  7/31/2021 1723 by Tressa Brito RN  Outcome: Progressing Towards Goal

## 2021-07-31 NOTE — PROGRESS NOTES
DC Plan: [facility]     Cm messaged [facility] via [employer] to see if pt can come to their facility today. Awaiting response.     __________________________________________________________    No response from [email or web address] [facility] via [employer]. Cm attempted to contact [facility] [phone]. Cm received no answer. CM met with pt and pt's daughter at the bedside. Cm provided them with an update. Cm called [facility] again and no one in admissions is available.

## 2021-07-31 NOTE — PROGRESS NOTES
CARDIOLOGY PROGRESS NOTE     Patient seen and examined. This is a patient who is followed for chest pain. Patient has a history of hypertension, coronary artery disease, and atrial fibrillation. Patient is sleeping this am, received Ativan for agitation. Per nursing patient has removed her IV access overnight and was combative. No other complaints reported. Telemetry reviewed, there were no events noted in the past 24 hours. Pertinent review of systems items noted above, all other systems are negative. Current medications reviewed. Physical Examination  Vital signs are stable. Blood pressure 145/61 , Pulse 67  No apparent distress. Heart is regular, rate and rhythm. Normal S1, S2, no murmurs are appreciated. Lungs are clear bilaterally. Abdomen is soft, nontender, normal bowel sounds. Extremities have no edema. Labs reviewed. Labs pending. 7/27/21 - 2- D echo:   · LV: Calculated LVEF is 65%. Normal cavity size, wall thickness, systolic function (ejection fraction normal) and diastolic function. Wall motion: normal. Normal left ventricular strain. · LA: Dilated left atrium. · TV: Mild to moderate tricuspid valve regurgitation is present. Diastolic function if indeterminate given presence of atrial fibrillation. Given age and dilated left atrium, likely has some degree of diastolic dysfunction. Case discussed with Dr. Maynor Starr and our impression and recommendations are as follows:    1. Chest pain: No active chest pain. Troponins are negative x 4. Serial EKGs are nonischemic. ACS ruled out. Echocardiogram with preserved EF and no wall motion abnormalities. Continue telemetry monitoring. Isosorbide on hold due to low BP.      2. Atrial fibrillation: Continue amio gtt will transition to PO. Her CHADS2-VASc score is 4; will transition from heparin to coumadin. Continue telemetry monitoring. Keep serum potassium between 4-5 and serum magnesium > 2. Continue PO Cardizem and titrate as needed. 3. Sinus tachycardia: compensatory due to sepsis, cholecystitis. Hold all beta blockers    4. Hypotension: blood pressure appears close to goal. No longer requiring pressor support. 5. Acute cholecystis: surgery on the case. Given tachycardia and negative trops x4, with normal TTE, no further risk stratification needed prior to procedure/surgery.     Please do not hesitate to call me     Evans Cuadra MD, Margorie Litten, VI  Structural Heart Disease  Endovascular and Vascular Medicine  Interventional Cardiology  [facility] Cardiology  [phone]

## 2021-07-31 NOTE — PROGRESS NOTES
Problem: Pressure Injury - Risk of  Goal: *Prevention of pressure injury  Description: Document Richard Scale and appropriate interventions in the flowsheet. Outcome: Progressing Towards Goal  Note: Pressure Injury Interventions:  Sensory Interventions: Assess changes in LOC, Float heels, Keep linens dry and wrinkle-free, Minimize linen layers    Moisture Interventions: Absorbent underpads, Minimize layers    Activity Interventions: Pressure redistribution bed/mattress(bed type)    Mobility Interventions: PT/OT evaluation, Float heels    Nutrition Interventions: Offer support with meals,snacks and hydration, Document food/fluid/supplement intake    Friction and Shear Interventions: Minimize layers, Lift sheet                Problem: Patient Education: Go to Patient Education Activity  Goal: Patient/Family Education  Outcome: Progressing Towards Goal     Problem: Falls - Risk of  Goal: *Absence of Falls  Description: Document Dustin Fall Risk and appropriate interventions in the flowsheet.   Outcome: Progressing Towards Goal  Note: Fall Risk Interventions:  Mobility Interventions: Bed/chair exit alarm, Patient to call before getting OOB    Mentation Interventions: Bed/chair exit alarm    Medication Interventions: Bed/chair exit alarm, Patient to call before getting OOB    Elimination Interventions: Bed/chair exit alarm, Call light in reach, Patient to call for help with toileting needs    History of Falls Interventions: Bed/chair exit alarm         Problem: Patient Education: Go to Patient Education Activity  Goal: Patient/Family Education  Outcome: Progressing Towards Goal     Problem: Patient Education: Go to Patient Education Activity  Goal: Patient/Family Education  Outcome: Progressing Towards Goal     Problem: Pain  Goal: *Control of Pain  Outcome: Progressing Towards Goal

## 2021-07-31 NOTE — PROGRESS NOTES
Patient seen in bed  Sleepy but arousable  Confused  Abdomen soft nontender nondistended  Assessment plan:  Patient is doing well status post cholecystostomy tube placement however remains confused. She is very sleepy this a.m. I have discontinued her Ativan and Seroquel orders.   It appears she has got Ativan once on July 29 around 11:30 PM when she got 2 mg  Patient can be discharged from surgical perspective

## 2021-07-31 NOTE — PROGRESS NOTES
Problem: Mobility Impaired (Adult and Pediatric)  Goal: *Acute Goals and Plan of Care (Insert Text)  Description: Reassessment 7/31/21:  Pt will be I with LE HEP in 7 days. Pt will perform bed mobility with mod I in 7 days. Pt will perform transfers with mod I in 7 days. Pt will amb 150 feet with LRAD safely with mod I in 7 days. Outcome: Progressing Towards Goal     Problem: Patient Education: Go to Patient Education Activity  Goal: Patient/Family Education  Outcome: Progressing Towards Goal       PHYSICAL THERAPY REEVALUATION  Patient: Bharathi Jack (80 y.o. female)  Date: 7/31/2021  Primary Diagnosis: Unstable angina ([address] [I20.0]  Chest pain with moderate risk for cardiac etiology [R07.9]  Chest pain [R07.9]  Shortness of breath [R06.02]  Fluid overload [E87.70]        Precautions:       ASSESSMENT  This is a 79 y/o female came to  [facility] ED with c/o chest pain. Chest pain started last night around 2100 located substernally and in her RUQ and LUQ, admitted on 7/25/21 for unstable angina, chest pain, SOB , and  fluid overload. Pt has PMH of  HTN, CAD, and  afib, had cardiac cath 11 years ago and found to have a lesion in an arch and instead of stenting, opted to start on anticoagulation. Discovered to have UTI and sepsis. Pt lives alone in [address] [facility] [address] with 2 JOSHUA with no railing. Pt IND at PLOF, did not use AD. Shower/tub combo. Pt pwns shower chair, and w/c but they belong to her  who has passed away and was very tall. Pt furniture walks in the home. PT eval compelted on 7/26/21. On 7/27/21 she transferred to ICU due to Septic shock secondary to cholecystitis. Pt has transferred form ICU and is now back on cardiac unit. PT RA completed today  as new PT orders have been placed. Pt found supine in bed with daughter present. Pt A&O x4. Supine to sit at [address] with additional time to complete. Sit to stand at [address]. Pt amb 20 feet with FWW at CGA to commode. Transfer on/off commode at CGA.  Pt able to wash hands at sink without LOB noted. Treatment ended with pt sitting up in chair with call bell and daughter present. Pt demos weakness, decreased standing balance, and decreased activity tolerance which impact her functional mobility. Pt would benefit from acute PT to address her limitations. PT rec DC to IRF at this time as pt lives alone. Patient will benefit from skilled therapy intervention to address the above noted impairments. PLAN :  Recommendations and Planned Interventions: bed mobility training, transfer training, gait training, therapeutic exercises, neuromuscular re-education, and therapeutic activities      Frequency/Duration: Patient will be followed by physical therapy:  5 times a week to address goals. Recommendation for discharge: (in order for the patient to meet his/her long term goals)  Therapy 3 hours per day 5-7 days per week    This discharge recommendation:  Has not yet been discussed the attending provider and/or case management    Equipment recommendations for successful discharge (if) home: none         SUBJECTIVE:   Patient stated I don't want to go to a nursing home.     OBJECTIVE DATA SUMMARY:   HISTORY:    Past Medical History:   Diagnosis Date    Atrial fibrillation ([facility]     CAD (coronary artery disease)     Diabetes mellitus ([facility]     resolved    Endometrial cancer ([facility]     s/p TAHBSO, RT in 1990s    Hypertension     Skin cancer      Past Surgical History:   Procedure Laterality Date    HX HYSTERECTOMY      IR CHOLECYSTOSTOMY PERCUTANEOUS  7/27/2021     Hospital course since last seen and reason for reevaluation: ICU transfer    Home Situation  Home Environment: Private residence  # Steps to Enter: 2 (one step from garage, one from utility room)  Rails to [employer]: No (pt uses frame of door)  One/Two Story Residence: One story  Living Alone: Yes  Support Systems: Family member(s)  Patient Expects to be Discharged to[de-identified] House  Current DME Used/Available at Home: Shower chair (pt has 's w/c and RW but would need her own)    EXAMINATION/PRESENTATION/DECISION MAKING:   Critical Behavior:  Neurologic State: Alert  Orientation Level: Oriented X4  Cognition: Appropriate for age attention/concentration, Appropriate safety awareness  Safety/Judgement: Decreased awareness of need for assistance  Hearing: Auditory  Auditory Impairment: Hard of hearing, right side    Range Of Motion:  AROM: Generally decreased, functional         Strength:    Strength: Generally decreased, functional                    Tone & Sensation:   Tone: Normal              Functional Mobility:  Bed Mobility:  Rolling: Contact guard assistance  Supine to Sit: Contact guard assistance     Scooting: Contact guard assistance  Transfers:  Sit to Stand: Contact guard assistance  Stand to Sit: Contact guard assistance                       Balance:   Sitting: Intact  Standing: Impaired  Standing - Static: Good  Standing - Dynamic : Constant support;Good  Ambulation/Gait Training:  Distance (ft): 20 Feet (ft)  Assistive Device: Gait belt;Walker, rolling  Ambulation - Level of Assistance: Contact guard assistance                       Speed/Alissa: Slow               Pain Ratin/10    Activity Tolerance:   Fair  Please refer to the flowsheet for vital signs taken during this treatment. After treatment patient left in no apparent distress:   Sitting in chair, Call bell within reach, and Caregiver / family present    COMMUNICATION/EDUCATION:   The patients plan of care was discussed with: Occupational therapist and Registered nurse. Patient/family agree to work toward stated goals and plan of care.     Thank you for this referral.  Ilsa Lau, PT, DPT   Time Calculation: 40 mins

## 2021-07-31 NOTE — PROGRESS NOTES
Problem: Self Care Deficits Care Plan (Adult)  Goal: *Acute Goals and Plan of Care (Insert Text)  Description: Pt will be independence sup<->sit in prep for EOB ADL's  Pt will be supervision/set-up  LB dressing EOB/long sit level  Pt will be modified independence  sit<-> prep for toilet transfer  Pt will be modified independence  BSC/toilet transfer with LRAD  Pt will be modified independence  toileting/cloth mgmt LRAD  Pt will be modified independence  grooming standing sink  Pt will be modified independence bathing sitting/standing sink LRAD  Pt will be MI sally UE HEP in prep for self care tasks  Outcome: Not Met     Problem: Patient Education: Go to Patient Education Activity  Goal: Patient/Family Education  Outcome: Not Met   OCCUPATIONAL THERAPY EVALUATION  Patient: Denise Tiwari (80 y.o. female)  Date: 7/31/2021  Primary Diagnosis: Unstable angina ([facility] [I20.0]  Chest pain with moderate risk for cardiac etiology [R07.9]  Chest pain [R07.9]  Shortness of breath [R06.02]  Fluid overload [E87.70]        Precautions: fall    ASSESSMENT  Based on the objective data described below, the patient presents with decreased standing balance, decreased safety awareness, and decreased BLE strength. Pt is an 79 yo female with a PMH of HTN, CAD, a fib, and uterine cancer, and hysterectomy, who presented to [facility] ED on 7/25/2021 with sharp, substernal chest pain and nausea, found to be in a fib with RVR. Pt also reported vagibal bleeding while on Eliquis and now on warfarin. Pt being followed by gynecology. Pt was giving ASA, nitroglycerin, and pain medication for relief. Pt has leukocytosis of unclear etiology with increasing WBC. CXR showed central pulmonary vascular prominence and coarse interstitial opacities. Pt found to have septic shock secondary to cholecystitis and general surgery consulted. Cholecystostomy tube placed, pt was on pressors and IV antibiotics.  Pt was transferred to ICU on 7/26 due to AMS, hypotension, and tachycardia. Pt presented with increased agitation (treated with Ativan) but was transferred back to [facility] on 7/30. Pt's PLOF: Alone in one story home with 2 JOSHUA; she was IND with her ADLs/IADLs. Pt has 's DME but it does not fit her for use. Would need a new RW and possible shower chair. Daughter present during eval and supportive. Pt received in room semi-supine, agreeable to OT eval. Co-eval with PT to improve patient safety. Pt A&Ox4, transferred with bed flat with CGA to EOB. Pt reported she had previous falls at home from tripping on a rug. Pt reported/demonstrated she was unable to bend forward or cross legs to don socks, often wears slip on shoes at home. Pt transferred with CGA set up cholecystostomy bag on RW for safety; pt ambulated to bathroom with CGA and RW. Pt  transferred [address] <> toilet, toilet hygiene after voiding urine/BM with CGA. Pt stood at sink with CGA/SBA for hand washing, transferred to bedside chair after with CGA and RW, cues for hand placement. Pt's HR ranged from  bpm, active a fib. Discussed IRF discharge with daughter, and pt reported IRF was her preference over SNF. Educated pt on 3 hours/day requirement but that rest breaks would be included in the day. Pt would benefit from acute OT services to address deficits and improve her IND with her ADLs. Recommend pt discharge to IRF vs SNF pending medical stability and improved mentation. PLAN :  Recommendations and Planned Interventions: self care training, functional mobility training, therapeutic exercise, balance training, therapeutic activities, endurance activities, patient education and home safety training    Frequency/Duration: Patient will be followed by occupational therapy 5 times a week to address goals.     Recommendation for discharge: (in order for the patient to meet his/her long term goals)  Recommend discharge to IRF vs SNF    This discharge recommendation:  Has not yet been discussed the attending provider and/or case management    IF patient discharges home will need the following DME: AE: long handled bathing, AE: long handled dressing, shower chair and walker: rolling       SUBJECTIVE:   Patient stated I was doing everything at home.     OBJECTIVE DATA SUMMARY:   HISTORY:   Past Medical History:   Diagnosis Date    At[facility] fibrillation ([facility] [address]     CAD (coronary artery disease)     Diabetes mellitus ([facility] [address]     resolved    [facility] ([facility]     s/p TAHBSO, RT in 1990s    Hypertension     Skin cancer      Past Surgical History:   Procedure Laterality Date    HX HYSTERECTOMY      IR CHOLECYSTOSTOMY PERCUTANEOUS  7/27/2021       Expanded or extensive additional review of patient history:     Home Situation  Home Environment: Private residence  # Steps to Enter: 2 (one step from garage, one from utility room)  Rails to [employer]: No (pt uses frame of door)  One/Two Story Residence: One story  Living Alone: Yes  Support Systems: Family member(s)  Patient Expects to be Discharged to[de-identified] House  Current DME Used/Available at Home: Shower chair (pt has 's w/c and RW but would need her own)    Hand dominance: Right    EXAMINATION OF PERFORMANCE DEFICITS:  Cognitive/Behavioral Status:  Neurologic State: Alert  Orientation Level: Oriented X4  Cognition: Appropriate for age attention/concentration; Appropriate safety awareness        Safety/Judgement: Decreased awareness of need for assistance    Hearing: Auditory  Auditory Impairment: Hard of hearing, right side    Range of Motion:    AROM: Generally decreased, functional    Strength:    Strength: Generally decreased, functional    Coordination:     Fine Motor Skills-Upper: Left Intact; Right Intact       Tone & Sensation:    Tone: Normal    Balance:  Sitting: Intact  Standing: Impaired  Standing - Static: Good  Standing - Dynamic : Constant support;Good    Functional Mobility and Transfers for ADLs:  Bed Mobility:  Rolling: Contact guard assistance  Supine to Sit: Contact guard assistance  Scooting: Contact guard assistance    Transfers:  Sit to Stand: Contact guard assistance  Stand to Sit: Contact guard assistance  Bathroom Mobility: Contact guard assistance  Toilet Transfer : Contact guard assistance    ADL Assessment:    Oral Facial Hygiene/Grooming: Stand-by assistance    Lower Body Dressing: Total assistance    Toileting: Contact guard assistance    ADL Intervention and task modifications:    Grooming  Grooming Assistance: Stand-by assistance;Contact guard assistance  Position Performed: Standing  Washing Hands: Stand-by assistance;Contact guard assistance    Lower Body Dressing Assistance  Dressing Assistance: Total assistance(dependent)  Socks: Total assistance (dependent)  Leg Crossed Method Used: No (pt unable to acheive)  Position Performed: Seated edge of bed    Toileting  Toileting Assistance: Contact guard assistance  Bladder Hygiene: Supervision  Bowel Hygiene: Supervision    Cognitive Retraining  Safety/Judgement: Decreased awareness of need for assistance      Functional Measure:    [address] [place] [id number] AM-PAC \"6 Clicks\"                                                       Daily Activity Inpatient Short Form  How much help from another person does the patient currently need. .. Total; A Lot A Little None   1. Putting on and taking off regular lower body clothing? [x]  1 []  2 []  3 []  4   2. Bathing (including washing, rinsing, drying)? []  1 []  2 [x]  3 []  4   3. Toileting, which includes using toilet, bedpan or urinal? [] 1 []  2 [x]  3 []  4   4. Putting on and taking off regular upper body clothing? []  1 []  2 []  3 [x]  4   5. Taking care of personal grooming such as brushing teeth? []  1 []  2 []  3 [x]  4   6. Eating meals? []  1 []  2 []  3 [x]  4   © 2007, Trustees of [address] [place] [address] [address] [address], under license to [employer].  All rights reserved     Score: 19/24     Interpretation of Tool:  Represents clinically-significant functional categories (i.e. Activities of daily living). Percentage of Impairment CH    0%   CI    1-19% CJ    20-39% CK    40-59% CL    60-79% CM    80-99% CN     100%   [facility]  Score 6-24 24 23 20-22 15-19 10-14 7-9 6       Occupational Therapy Evaluation Charge Determination   History Examination Decision-Making   MEDIUM Complexity : Expanded review of history including physical, cognitive and psychosocial  history  MEDIUM Complexity : 3-5 performance deficits relating to physical, cognitive , or psychosocial skils that result in activity limitations and / or participation restrictions MEDIUM Complexity : Patient may present with comorbidities that affect occupational performnce. Miniml to moderate modification of tasks or assistance (eg, physical or verbal ) with assesment(s) is necessary to enable patient to complete evaluation       Based on the above components, the patient evaluation is determined to be of the following complexity level: MEDIUM  Pain Ratin/10    Activity Tolerance:   Good  Please refer to the flowsheet for vital signs taken during this treatment. After treatment patient left in no apparent distress:    Sitting in chair and Caregiver / family present    COMMUNICATION/EDUCATION:   The patients plan of care was discussed with: Physical therapist.     Patient/family agree to work toward stated goals and plan of care. This patients plan of care is appropriate for delegation to [facility].     Thank you for this referral.  Ivey Ormond, OT  Time Calculation: 40 mins

## 2021-07-31 NOTE — PROGRESS NOTES
Nephrology Consult    Patient: Viviana Gonzales MRN: [id number]  SSN: [id number]    YOB: 1933  Age: 80 y.o.   Sex: female      Subjective:   Pt seen in room  Cr 0.6  Na 141  Off IVF    Past Medical History:   Diagnosis Date    Atrial fibrillation ([facility] [facility]     CAD (coronary artery disease)     Diabetes mellitus ([facility] [address] [facility]     resolved    Endometrial cancer ([facility] [address]     s/p TAHBSO, RT in 1990s    Hypertension     Skin cancer      Past Surgical History:   Procedure Laterality Date    HX HYSTERECTOMY      IR CHOLECYSTOSTOMY PERCUTANEOUS  7/27/2021      Family History   Problem Relation Age of Onset    Hypertension Father     Cancer Father     Hypertension Brother     Ovarian Cancer Other         daughter, unsure if genetic testing performed     Social History     Tobacco Use    Smoking status: Former Smoker    Smokeless tobacco: Never Used    Tobacco comment: quit >50 yrs ago   Substance Use Topics    Alcohol use: Not on file      Current Facility-Administered Medications   Medication Dose Route Frequency Provider Last Rate Last Admin    metoclopramide HCl (REGLAN) injection 10 mg  10 mg IntraVENous Q6H PRN Zahraa Sequeira MD        Warfarin - Pharmacy to Dose   Other Rx Dosing/Monitoring Zahraa Sequeira MD        [facility] 1 g in sterile water (preservative free) 20 mL IV syringe  1 g IntraVENous Epifanio Wolf, Anup Greco MD   1 g at 07/31/21 1306    heparin (porcine) injection 5,000 Units  5,000 Units SubCUTAneous Martha Barbour MD   5,000 Units at 07/31/21 0830    dilTIAZem IR (CARDIZEM) tablet 30 mg  30 mg Oral Q6H Larry Silverman NP   30 mg at 07/31/21 1305    NOREPINephrine (LEVOPHED) 8 mg in 5% dextrose 250mL (32 mcg/mL) infusion  0.5-30 mcg/min IntraVENous TITRATE Edwar Mata MD   Stopped at 07/28/21 0500    promethazine (PHENERGAN) tablet 25 mg  25 mg Oral Q4H PRN Evan Lauren MD   25 mg at 07/28/21 0518    DOBUTamine (DOBUTREX) 500 mg/250 mL (2,000 mcg/mL) infusion  0-10 mcg/kg/min IntraVENous TITRATE Ramila Benson MD 2.6 mL/hr at 07/29/21 0146 1 mcg/kg/min at 07/29/21 0146    sodium chloride (NS) flush 5-40 mL  5-40 mL IntraVENous Q8H Rudolpho Fairly, NP   10 mL at 07/31/21 0541    sodium chloride (NS) flush 5-40 mL  5-40 mL IntraVENous PRN Rudolpho Fairly, NP        acetaminophen (TYLENOL) tablet 650 mg  650 mg Oral Q6H PRN Rudolpho Fairly, NP   650 mg at 07/29/21 0049    Or    acetaminophen (TYLENOL) suppository 650 mg  650 mg Rectal Q6H PRN Claudiaolpho Fairly, NP        polyethylene glycol (MIRALAX) packet 17 g  17 g Oral DAILY PRN Claudiaolpho Fairly, NP        bisacodyL (DULCOLAX) tablet 5 mg  5 mg Oral DAILY PRN Rudolpho Fairly, NP        famotidine (PEPCID) tablet 20 mg  20 mg Oral BID Rudolpho Fairly, NP   20 mg at 07/31/21 2492    atorvastatin (LIPITOR) tablet 20 mg  20 mg Oral DAILY Claudiaolpho Fairly, NP   20 mg at 07/31/21 0831    calcium-vitamin D 600 mg(1,500mg) -200 unit per tablet 1 Tablet  1 Tablet Oral BID Rudolpho Fairly, NP   1 Tablet at 07/31/21 1021    cholecalciferol (VITAMIN D3) (1000 Units /25 mcg) tablet 2,000 Units  2,000 Units Oral DAILY Rudolpho Fairly, NP   2,000 Units at 07/31/21 0900    isosorbide mononitrate ER (IMDUR) tablet 30 mg  30 mg Oral DAILY Rudolpho Fairly, NP   30 mg at 07/31/21 0831    levothyroxine (SYNTHROID) tablet 75 mcg  75 mcg Oral ACB Claudiaolpho Fairly, NP   75 mcg at 07/31/21 1707    [Held by provider] metoprolol succinate (TOPROL-XL) XL tablet 50 mg  50 mg Oral DAILY Rudolpho Fairly, NP   50 mg at 07/26/21 0809    minoxidiL (LONITEN) tablet 5 mg  5 mg Oral DAILY Bladimir Lopes NP   5 mg at 07/31/21 0900        Allergies   Allergen Reactions    Penicillins Unknown (comments)       Review of Systems:  A comprehensive review of systems was negative except for that written in the History of Present Illness.     Objective:     Vitals: 07/31/21 0836 07/31/21 0849 07/31/21 1531 07/31/21 1534   BP:   (!) 104/55 (!) 101/54   Pulse:   99 95   Resp:   22    Temp:   97.8 °F (36.6 °C)    SpO2: 97% 97% 96%    Weight:       Height:            Physical Exam:  General: NAD  Eyes: sclera anicteric  Oral Cavity: No thrush or ulcers  Neck: no JVD  Chest: Fair bilateral air entry  Heart: normal sounds  Abdomen: soft and  tender +  :  [place]+  Lower Extremities: no edema  Skin: no rash  Neuro: intact  Psychiatric: non-depressed            Assessment:     Hospital Problems  Never Reviewed        Codes Class Noted POA    Unstable angina ([facility] ICD-10-CM: I20.0  ICD-9-CM: 411.1  7/25/2021 Unknown        Chest pain with moderate risk for cardiac etiology ICD-10-CM: R07.9  ICD-9-CM: 786.50  7/25/2021 Unknown              Plan:   1 acute kidney injury:  -2/2 prerenal azotemia from sepsis/hypotension plus on diuretics and lisinopril.  -On admission creatinine was 0.9 and has bumped to 1.8 in the last 24 hours.   -Cr has improved to 0.6   -Clinically no evidence of volume overload. Chest x-ray is clear. -2D echocardiogram showed preserved LVEF. -Agree to hold lisinopril diuretics and nitrates. -off  IV fluids.    -No evidence of hydronephrosis per CT of the abdomen. 2.  Hypokalemia.    -From low p.o. intake/hypomagnesemia. -K was 3.2 and magnesium 1.3.    -replaced with K and IV magnesium sulfate.  -K 3.6, Mg 2.0    3. Septic shock:    -Admitted with right upper quadrant pain, WBC 21K, hypotensive. -CT abdomen showed distended gallbladder and fluid around diagnosed with acute cholecystitis s/p cholecystotomy   -GNR in urine and biliary culture, on iv meropenem  -On IV antibiotics. Off pressor. 4. anemia.    -Hemoglobin 8.9.   - I will send iron studies and ferritin level      5. A. fib. With RVR. on IV amiodarone drip. 6. DVT prophylaxis:  -changed lovenox to unfractionated sq hep    7.  Hyponatremia:  -Na 128 (lab error)  -U Na 29  -Na 141 8. HTN:  -low BP  -will dc minoxidil    Signed By: Yvette Zepeda MD     July 31, 2021

## 2021-08-01 LAB
ALBUMIN SERPL-MCNC: 2.2 G/DL (ref 3.5–5)
ALBUMIN/GLOB SERPL: 0.6 {RATIO} (ref 1.1–2.2)
ALP SERPL-CCNC: 83 U/L (ref 45–117)
ALT SERPL-CCNC: 38 U/L (ref 12–78)
ANION GAP SERPL CALC-SCNC: 3 MMOL/L (ref 5–15)
AST SERPL W P-5'-P-CCNC: 22 U/L (ref 15–37)
BASOPHILS # BLD: 0 K/UL (ref 0–0.1)
BASOPHILS NFR BLD: 0 % (ref 0–1)
BILIRUB SERPL-MCNC: 0.7 MG/DL (ref 0.2–1)
BUN SERPL-MCNC: 16 MG/DL (ref 6–20)
BUN/CREAT SERPL: 27 (ref 12–20)
CA-I BLD-MCNC: 8.7 MG/DL (ref 8.5–10.1)
CHLORIDE SERPL-SCNC: 105 MMOL/L (ref 97–108)
CO2 SERPL-SCNC: 33 MMOL/L (ref 21–32)
CREAT SERPL-MCNC: 0.6 MG/DL (ref 0.55–1.02)
DIFFERENTIAL METHOD BLD: ABNORMAL
EOSINOPHIL # BLD: 0 K/UL (ref 0–0.4)
EOSINOPHIL NFR BLD: 0 % (ref 0–7)
ERYTHROCYTE [DISTWIDTH] IN BLOOD BY AUTOMATED COUNT: 16.8 % (ref 11.5–14.5)
GLOBULIN SER CALC-MCNC: 3.4 G/DL (ref 2–4)
GLUCOSE SERPL-MCNC: 86 MG/DL (ref 65–100)
HCT VFR BLD AUTO: 30.6 % (ref 35–47)
HGB BLD-MCNC: 10.1 G/DL (ref 11.5–16)
IMM GRANULOCYTES # BLD AUTO: 0 K/UL
IMM GRANULOCYTES NFR BLD AUTO: 0 %
INR PPP: 1.3 (ref 0.9–1.1)
LYMPHOCYTES # BLD: 0.7 K/UL (ref 0.8–3.5)
LYMPHOCYTES NFR BLD: 8 % (ref 12–49)
MCH RBC QN AUTO: 29.8 PG (ref 26–34)
MCHC RBC AUTO-ENTMCNC: 33 G/DL (ref 30–36.5)
MCV RBC AUTO: 90.3 FL (ref 80–99)
MONOCYTES # BLD: 0.4 K/UL (ref 0–1)
MONOCYTES NFR BLD: 4 % (ref 5–13)
NEUTS SEG # BLD: 7.7 K/UL (ref 1.8–8)
NEUTS SEG NFR BLD: 88 % (ref 32–75)
NRBC # BLD: 0.02 K/UL (ref 0–0.01)
NRBC BLD-RTO: 0.2 PER 100 WBC
PLATELET # BLD AUTO: 271 K/UL (ref 150–400)
PMV BLD AUTO: 10 FL (ref 8.9–12.9)
POTASSIUM SERPL-SCNC: 3.8 MMOL/L (ref 3.5–5.1)
PROT SERPL-MCNC: 5.6 G/DL (ref 6.4–8.2)
PROTHROMBIN TIME: 15.8 SEC (ref 11.9–14.7)
RBC # BLD AUTO: 3.39 M/UL (ref 3.8–5.2)
RBC MORPH BLD: ABNORMAL
RBC MORPH BLD: ABNORMAL
SODIUM SERPL-SCNC: 141 MMOL/L (ref 136–145)
WBC # BLD AUTO: 8.8 K/UL (ref 3.6–11)

## 2021-08-01 PROCEDURE — 36415 COLL VENOUS BLD VENIPUNCTURE: CPT

## 2021-08-01 PROCEDURE — 65270000029 HC RM PRIVATE

## 2021-08-01 PROCEDURE — 74011250637 HC RX REV CODE- 250/637: Performed by: NURSE PRACTITIONER

## 2021-08-01 PROCEDURE — 74011000250 HC RX REV CODE- 250: Performed by: INTERNAL MEDICINE

## 2021-08-01 PROCEDURE — 74011250636 HC RX REV CODE- 250/636: Performed by: INTERNAL MEDICINE

## 2021-08-01 PROCEDURE — 80053 COMPREHEN METABOLIC PANEL: CPT

## 2021-08-01 PROCEDURE — 94760 N-INVAS EAR/PLS OXIMETRY 1: CPT

## 2021-08-01 PROCEDURE — 85025 COMPLETE CBC W/AUTO DIFF WBC: CPT

## 2021-08-01 PROCEDURE — 85610 PROTHROMBIN TIME: CPT

## 2021-08-01 PROCEDURE — 74011250637 HC RX REV CODE- 250/637: Performed by: INTERNAL MEDICINE

## 2021-08-01 PROCEDURE — 77010033678 HC OXYGEN DAILY

## 2021-08-01 RX ORDER — WARFARIN SODIUM 5 MG/1
5 TABLET ORAL ONCE
Status: COMPLETED | OUTPATIENT
Start: 2021-08-01 | End: 2021-08-01

## 2021-08-01 RX ORDER — POTASSIUM CHLORIDE 20 MEQ/1
20 TABLET, EXTENDED RELEASE ORAL
Status: COMPLETED | OUTPATIENT
Start: 2021-08-01 | End: 2021-08-01

## 2021-08-01 RX ADMIN — FAMOTIDINE 20 MG: 20 TABLET ORAL at 08:50

## 2021-08-01 RX ADMIN — MEROPENEM 1 G: 1 INJECTION, POWDER, FOR SOLUTION INTRAVENOUS at 20:45

## 2021-08-01 RX ADMIN — DILTIAZEM HYDROCHLORIDE 30 MG: 30 TABLET, FILM COATED ORAL at 23:21

## 2021-08-01 RX ADMIN — MEROPENEM 1 G: 1 INJECTION, POWDER, FOR SOLUTION INTRAVENOUS at 04:31

## 2021-08-01 RX ADMIN — HEPARIN SODIUM 5000 UNITS: 5000 INJECTION INTRAVENOUS; SUBCUTANEOUS at 20:50

## 2021-08-01 RX ADMIN — POTASSIUM CHLORIDE 20 MEQ: 1500 TABLET, EXTENDED RELEASE ORAL at 11:28

## 2021-08-01 RX ADMIN — MEROPENEM 1 G: 1 INJECTION, POWDER, FOR SOLUTION INTRAVENOUS at 11:29

## 2021-08-01 RX ADMIN — Medication 10 ML: at 20:51

## 2021-08-01 RX ADMIN — DILTIAZEM HYDROCHLORIDE 30 MG: 30 TABLET, FILM COATED ORAL at 11:28

## 2021-08-01 RX ADMIN — Medication 2000 UNITS: at 08:50

## 2021-08-01 RX ADMIN — ATORVASTATIN CALCIUM 20 MG: 20 TABLET, FILM COATED ORAL at 08:50

## 2021-08-01 RX ADMIN — ISOSORBIDE MONONITRATE 30 MG: 30 TABLET, EXTENDED RELEASE ORAL at 08:51

## 2021-08-01 RX ADMIN — LEVOTHYROXINE SODIUM 75 MCG: 75 TABLET ORAL at 10:43

## 2021-08-01 RX ADMIN — Medication 10 ML: at 14:28

## 2021-08-01 RX ADMIN — WARFARIN SODIUM 5 MG: 5 TABLET ORAL at 18:38

## 2021-08-01 RX ADMIN — Medication 10 ML: at 05:57

## 2021-08-01 RX ADMIN — DILTIAZEM HYDROCHLORIDE 30 MG: 30 TABLET, FILM COATED ORAL at 06:04

## 2021-08-01 RX ADMIN — Medication 1 TABLET: at 20:46

## 2021-08-01 RX ADMIN — FAMOTIDINE 20 MG: 20 TABLET ORAL at 20:46

## 2021-08-01 RX ADMIN — Medication 1 TABLET: at 08:50

## 2021-08-01 RX ADMIN — DILTIAZEM HYDROCHLORIDE 30 MG: 30 TABLET, FILM COATED ORAL at 18:38

## 2021-08-01 NOTE — PROGRESS NOTES
Problem: Pressure Injury - Risk of  Goal: *Prevention of pressure injury  Description: Document Richard Scale and appropriate interventions in the flowsheet. Outcome: Progressing Towards Goal  Note: Pressure Injury Interventions:  Sensory Interventions: Float heels, Assess changes in LOC, Chair cushion, Maintain/enhance activity level, Keep linens dry and wrinkle-free, Pressure redistribution bed/mattress (bed type)    Moisture Interventions: Apply protective barrier, creams and emollients, Check for incontinence Q2 hours and as needed, Internal/External urinary devices, Moisture barrier    Activity Interventions: Pressure redistribution bed/mattress(bed type), Increase time out of bed    Mobility Interventions: Pressure redistribution bed/mattress (bed type), PT/OT evaluation    Nutrition Interventions: Discuss nutritional consult with provider    Friction and Shear Interventions: Apply protective barrier, creams and emollients                Problem: Patient Education: Go to Patient Education Activity  Goal: Patient/Family Education  Outcome: Progressing Towards Goal     Problem: Falls - Risk of  Goal: *Absence of Falls  Description: Document Dustin Fall Risk and appropriate interventions in the flowsheet.   Outcome: Progressing Towards Goal  Note: Fall Risk Interventions:  Mobility Interventions: Bed/chair exit alarm, PT Consult for assist device competence, Strengthening exercises (ROM-active/passive)    Mentation Interventions: Bed/chair exit alarm, Door open when patient unattended, Eyeglasses and hearing aids, Increase mobility    Medication Interventions: Bed/chair exit alarm, Patient to call before getting OOB    Elimination Interventions: Patient to call for help with toileting needs, Call light in reach, Bed/chair exit alarm    History of Falls Interventions: Bed/chair exit alarm, Evaluate medications/consider consulting pharmacy, Room close to nurse's station         Problem: Patient Education: Go to Patient Education Activity  Goal: Patient/Family Education  Outcome: Progressing Towards Goal     Problem: Patient Education: Go to Patient Education Activity  Goal: Patient/Family Education  Outcome: Progressing Towards Goal     Problem: Pain  Goal: *Control of Pain  Outcome: Progressing Towards Goal     Problem: Patient Education: Go to Patient Education Activity  Goal: Patient/Family Education  Outcome: Progressing Towards Goal     Problem: Delirium Treatment  Goal: *Will remain free of delirium, CAM Score negative  Outcome: Progressing Towards Goal  Goal: *Cognitive status will be restored to baseline  Outcome: Progressing Towards Goal  Goal: Interventions  Outcome: Progressing Towards Goal     Problem: Patient Education: Go to Patient Education Activity  Goal: Patient/Family Education  Outcome: Progressing Towards Goal     Problem: Patient Education: Go to Patient Education Activity  Goal: Patient/Family Education  Outcome: Progressing Towards Goal

## 2021-08-01 NOTE — PROGRESS NOTES
Problem: Pressure Injury - Risk of  Goal: *Prevention of pressure injury  Description: Document Richard Scale and appropriate interventions in the flowsheet. Outcome: Progressing Towards Goal  Note: Pressure Injury Interventions:  Sensory Interventions: Assess changes in LOC, Float heels, Minimize linen layers    Moisture Interventions: Minimize layers    Activity Interventions: Pressure redistribution bed/mattress(bed type)    Mobility Interventions: Pressure redistribution bed/mattress (bed type)    Nutrition Interventions: Document food/fluid/supplement intake    Friction and Shear Interventions: Minimize layers, Feet elevated on foot rest                Problem: Patient Education: Go to Patient Education Activity  Goal: Patient/Family Education  Outcome: Progressing Towards Goal     Problem: Falls - Risk of  Goal: *Absence of Falls  Description: Document Dustin Fall Risk and appropriate interventions in the flowsheet.   Outcome: Progressing Towards Goal  Note: Fall Risk Interventions:  Mobility Interventions: Bed/chair exit alarm, Patient to call before getting OOB    Mentation Interventions: Bed/chair exit alarm, Adequate sleep, hydration, pain control    Medication Interventions: Bed/chair exit alarm, Patient to call before getting OOB    Elimination Interventions: Bed/chair exit alarm, Call light in reach, Patient to call for help with toileting needs    History of Falls Interventions: Bed/chair exit alarm         Problem: Patient Education: Go to Patient Education Activity  Goal: Patient/Family Education  Outcome: Progressing Towards Goal     Problem: Patient Education: Go to Patient Education Activity  Goal: Patient/Family Education  Outcome: Progressing Towards Goal     Problem: Pain  Goal: *Control of Pain  Outcome: Progressing Towards Goal     Problem: Patient Education: Go to Patient Education Activity  Goal: Patient/Family Education  Outcome: Progressing Towards Goal

## 2021-08-01 NOTE — PROGRESS NOTES
Hospitalist Progress Note    Subjective:   Daily Progress Note: 8/1/2021 8:49 AM    Hospital Course:  Caroline Manuel is a 80 y.o. female with a  past medical history of HTN, CAD, and atrial fibrillation, who presented to the ED with sharp, substernal chest pain and associated nausea and found to be in atrial fibrillation with RVR. Dasia Camachoo Patient additionally reported experiencing vaginal bleeding while on Eliquis and now on warfarin, she has a gynecology appointment scheduled for evaluation. Given ASA, pain medication and nitroglycerin with relief. Patient has leukocytosis of unclear etiology with an increasing white blood cell count since admission. Tenderness in the right upper quadrant as well as left upper quadrant. CT scan of the abdomen pelvis pending. Right upper quadrant ultrasound pending. Prophylactically will treat with Rocephin. Urine culture and blood culture pending. INR subtherapeutic at 1.4, BNP 4475. x4 Troponins negative. EKG showed afib. CXR showed central pulmonary vascular prominence and coarse interstitial opacities. Chest pain resolved. Cardiology consulted.  Echo revealed LVEF 65%, dilated left atrium, overnight patient became increasingly hypotensive, was transferred to the ICU, central line placed, obtained CT abdomen pelvis showing biliary sludge, urinalysis consistent with urinary tract infection, patient now s/p cholecystostomy tube, doing well on Meropenem, atrial fibrillation with RVR has resolved, patient had an episode of agitation overnight, likely secondary to sundowning versus ICU delirium, up for stepdown    Subjective:   Patient seen and evaluated at bedside, patient currently has no active complaints, discussed with RN  Current Facility-Administered Medications   Medication Dose Route Frequency    metoclopramide HCl (REGLAN) injection 10 mg  10 mg IntraVENous Q6H PRN    Warfarin - Pharmacy to Dose   Other Rx Dosing/Monitoring    meropenem (MERREM) 1 g in sterile water (preservative free) 20 mL IV syringe  1 g IntraVENous Q8H    heparin (porcine) injection 5,000 Units  5,000 Units SubCUTAneous Q12H    dilTIAZem IR (CARDIZEM) tablet 30 mg  30 mg Oral Q6H    NOREPINephrine (LEVOPHED) 8 mg in 5% dextrose 250mL (32 mcg/mL) infusion  0.5-30 mcg/min IntraVENous TITRATE    promethazine (PHENERGAN) tablet 25 mg  25 mg Oral Q4H PRN    DOBUTamine (DOBUTREX) 500 mg/250 mL (2,000 mcg/mL) infusion  0-10 mcg/kg/min IntraVENous TITRATE    sodium chloride (NS) flush 5-40 mL  5-40 mL IntraVENous Q8H    sodium chloride (NS) flush 5-40 mL  5-40 mL IntraVENous PRN    acetaminophen (TYLENOL) tablet 650 mg  650 mg Oral Q6H PRN    Or    acetaminophen (TYLENOL) suppository 650 mg  650 mg Rectal Q6H PRN    polyethylene glycol (MIRALAX) packet 17 g  17 g Oral DAILY PRN    bisacodyL (DULCOLAX) tablet 5 mg  5 mg Oral DAILY PRN    famotidine (PEPCID) tablet 20 mg  20 mg Oral BID    atorvastatin (LIPITOR) tablet 20 mg  20 mg Oral DAILY    calcium-vitamin D 600 mg(1,500mg) -200 unit per tablet 1 Tablet  1 Tablet Oral BID    cholecalciferol (VITAMIN D3) (1000 Units /25 mcg) tablet 2,000 Units  2,000 Units Oral DAILY    isosorbide mononitrate ER (IMDUR) tablet 30 mg  30 mg Oral DAILY    levothyroxine (SYNTHROID) tablet 75 mcg  75 mcg Oral ACB    [Held by provider] metoprolol succinate (TOPROL-XL) XL tablet 50 mg  50 mg Oral DAILY        Review of Systems  Review of Systems   Constitutional: Negative for chills, fever and weight loss. HENT: Negative. Eyes: Negative. Respiratory: Negative for cough, sputum production, shortness of breath and wheezing. Cardiovascular: Negative for chest pain, palpitations, orthopnea and leg swelling. Gastrointestinal: Positive for abdominal pain (minimal, epigastric and RUQ) and nausea. Negative for constipation, diarrhea and vomiting. Genitourinary: Negative for dysuria, frequency and urgency. Musculoskeletal: Negative. Neurological: Negative. Objective:     Visit Vitals  /61 (BP 1 Location: Left upper arm, BP Patient Position: At rest;Supine)   Pulse 67   Temp 98.2 °F (36.8 °C)   Resp 18   Ht 5' 4\" (1.626 m)   Wt 85.3 kg (188 lb)   SpO2 97%   Breastfeeding No   BMI 32.27 kg/m²    O2 Flow Rate (L/min): 2 l/min O2 Device: Nasal cannula    Temp (24hrs), Av.9 °F (36.6 °C), Min:97.7 °F (36.5 °C), Max:98.2 °F (36.8 °C)      No intake/output data recorded. 1901 -  0700  In: 50 [I.V.:50]  Out:  [Urine:1600; Drains:390]    Recent Results (from the past 24 hour(s))   CBC WITH AUTOMATED DIFF    Collection Time: 21  7:28 AM   Result Value Ref Range    WBC 8.8 3.6 - 11.0 K/uL    RBC 3.39 (L) 3.80 - 5.20 M/uL    HGB 10.1 (L) 11.5 - 16.0 g/dL    HCT 30.6 (L) 35.0 - 47.0 %    MCV 90.3 80.0 - 99.0 FL    MCH 29.8 26.0 - 34.0 PG    MCHC 33.0 30.0 - 36.5 g/dL    RDW 16.8 (H) 11.5 - 14.5 %    PLATELET 453 718 - 164 K/uL    MPV 10.0 8.9 - 12.9 FL    NRBC 0.2 (H) 0.0  WBC    ABSOLUTE NRBC 0.02 (H) 0.00 - 0.01 K/uL    NEUTROPHILS PENDING %    LYMPHOCYTES PENDING %    MONOCYTES PENDING %    EOSINOPHILS PENDING %    BASOPHILS PENDING %    IMMATURE GRANULOCYTES PENDING %    ABS. NEUTROPHILS PENDING K/UL    ABS. LYMPHOCYTES PENDING K/UL    ABS. MONOCYTES PENDING K/UL    ABS. EOSINOPHILS PENDING K/UL    ABS. BASOPHILS PENDING K/UL    ABS. IMM. GRANS.  PENDING K/UL    DF PENDING    METABOLIC PANEL, COMPREHENSIVE    Collection Time: 21  7:28 AM   Result Value Ref Range    Sodium 141 136 - 145 mmol/L    Potassium 3.8 3.5 - 5.1 mmol/L    Chloride 105 97 - 108 mmol/L    CO2 33 (H) 21 - 32 mmol/L    Anion gap 3 (L) 5 - 15 mmol/L    Glucose 86 65 - 100 mg/dL    BUN 16 6 - 20 mg/dL    Creatinine 0.60 0.55 - 1.02 mg/dL    BUN/Creatinine ratio 27 (H) 12 - 20      GFR est AA >60 >60 ml/min/1.73m2    GFR est non-AA >60 >60 ml/min/1.73m2    Calcium 8.7 8.5 - 10.1 mg/dL    Bilirubin, total 0.7 0.2 - 1.0 mg/dL    AST (SGOT) 22 15 - 37 U/L    ALT (SGPT) 38 12 - 78 U/L    Alk. phosphatase 83 45 - 117 U/L    Protein, total 5.6 (L) 6.4 - 8.2 g/dL    Albumin 2.2 (L) 3.5 - 5.0 g/dL    Globulin 3.4 2.0 - 4.0 g/dL    A-G Ratio 0.6 (L) 1.1 - 2.2          XR CHEST PORT   Final Result      XR CHEST PORT   Final Result      IR CHOLECYSTOSTOMY PERCUTANEOUS   Final Result   Successful ultrasound and fluoroscopic guided aspiration and   percutaneous cholecystostomy tube placement. CT ABD PELV WO CONT   Final Result   Findings are most suggestive of cholecystitis given the stranding   and fluid centered in the region of the gallbladder. Duodenitis and pancreatitis   might be considered in the differential given the location. Nonobstructing right   nephrolithiasis, bilateral renal cysts. Findings in the lung bases suggestive of   CHF/pulmonary edema with bilateral effusions and compressive bilateral lower   lobe atelectasis. Three-vessel coronary arterial calcifications. Diffuse   aortoiliac arteriosclerotic calcifications. XR CHEST PORT   Final Result      US RUQ   Final Result   Gallbladder sludge, without demonstrated stones      XR CHEST PORT   Final Result      XR CHEST PORT   Final Result   Central pulmonary vascular prominence likely accounting for the fullness in the   beatrice. There are also likely coarsened interstitial opacities that could   represent early interstitial edema or other interstitial process. No focal   airspace consolidation is evident. PHYSICAL EXAM:    Physical Exam  Vitals reviewed. Constitutional:       General: She is not in acute distress. Appearance: She is not ill-appearing or diaphoretic. HENT:      Head: Normocephalic and atraumatic. Mouth/Throat:      Mouth: Mucous membranes are moist.      Pharynx: Oropharynx is clear. Eyes:      Conjunctiva/sclera: Conjunctivae normal.      Pupils: Pupils are equal, round, and reactive to light. Cardiovascular:      Rate and Rhythm: Normal rate and regular rhythm. Pulses: Normal pulses. Heart sounds: Normal heart sounds. Pulmonary:      Effort: Pulmonary effort is normal.      Breath sounds: Normal breath sounds. No wheezing, rhonchi or rales. Chest:      Chest wall: No tenderness. Abdominal:      General: Abdomen is flat. There is no distension. Palpations: Abdomen is soft. There is no mass. Tenderness: There is abdominal tenderness (epigastric, moderate to palpation). Musculoskeletal:         General: No tenderness or deformity. Normal range of motion. Skin:     General: Skin is warm. Neurological:      General: No focal deficit present. Mental Status: She is alert and oriented to person, place, and time. Data Review    Recent Results (from the past 24 hour(s))   CBC WITH AUTOMATED DIFF    Collection Time: 08/01/21  7:28 AM   Result Value Ref Range    WBC 8.8 3.6 - 11.0 K/uL    RBC 3.39 (L) 3.80 - 5.20 M/uL    HGB 10.1 (L) 11.5 - 16.0 g/dL    HCT 30.6 (L) 35.0 - 47.0 %    MCV 90.3 80.0 - 99.0 FL    MCH 29.8 26.0 - 34.0 PG    MCHC 33.0 30.0 - 36.5 g/dL    RDW 16.8 (H) 11.5 - 14.5 %    PLATELET 783 371 - 698 K/uL    MPV 10.0 8.9 - 12.9 FL    NRBC 0.2 (H) 0.0  WBC    ABSOLUTE NRBC 0.02 (H) 0.00 - 0.01 K/uL    NEUTROPHILS PENDING %    LYMPHOCYTES PENDING %    MONOCYTES PENDING %    EOSINOPHILS PENDING %    BASOPHILS PENDING %    IMMATURE GRANULOCYTES PENDING %    ABS. NEUTROPHILS PENDING K/UL    ABS. LYMPHOCYTES PENDING K/UL    ABS. MONOCYTES PENDING K/UL    ABS. EOSINOPHILS PENDING K/UL    ABS. BASOPHILS PENDING K/UL    ABS. IMM. GRANS.  PENDING K/UL    DF PENDING    METABOLIC PANEL, COMPREHENSIVE    Collection Time: 08/01/21  7:28 AM   Result Value Ref Range    Sodium 141 136 - 145 mmol/L    Potassium 3.8 3.5 - 5.1 mmol/L    Chloride 105 97 - 108 mmol/L    CO2 33 (H) 21 - 32 mmol/L    Anion gap 3 (L) 5 - 15 mmol/L    Glucose 86 65 - 100 mg/dL    BUN 16 6 - 20 mg/dL    Creatinine 0.60 0.55 - 1.02 mg/dL    BUN/Creatinine ratio 27 (H) 12 - 20      GFR est AA >60 >60 ml/min/1.73m2    GFR est non-AA >60 >60 ml/min/1.73m2    Calcium 8.7 8.5 - 10.1 mg/dL    Bilirubin, total 0.7 0.2 - 1.0 mg/dL    AST (SGOT) 22 15 - 37 U/L    ALT (SGPT) 38 12 - 78 U/L    Alk. phosphatase 83 45 - 117 U/L    Protein, total 5.6 (L) 6.4 - 8.2 g/dL    Albumin 2.2 (L) 3.5 - 5.0 g/dL    Globulin 3.4 2.0 - 4.0 g/dL    A-G Ratio 0.6 (L) 1.1 - 2.2          Assessment/Plan:     Active Problems:    Unstable angina ([address] (7/25/2021)      Chest pain with moderate risk for cardiac etiology (7/25/2021)      Impression:     1. Chest pain r/o ACS  2. CHF  3. A-fib  4. CAD  5. Hypertension  6. Hyperlipidemia  7. Hypothyroidism  8. Leukocytosis  9. Hypokalemia  10.  Vaginal bleeding     Plan:    Septic shock secondary to cholecystitisof note patient found to be in septic shock yesterday not responding to fluid resuscitation, currently now weaned off pressors based on patient's clinical presentation multifactorial including secondary to cholecystitis based on CT abdomen pelvis results versus urinary tract infection, currently s/p cholecystostomy tube placement, doing well, currently off of pressors  Follow-up blood cultures  Continue Meropenem for antibiotic coverage  General surgery consult appreciated  Infectious Disease consult appreciated    Urinary tract infectionpatient presents with septic shock with likely component of urinary tract infection given urinalysis  Follow blood cultures  Weaned off pressors  Follow-up urine culture  Continue Meropenem for antibiotic coverage    Acute decompensated heart failure with preserved ejection fractionpatient currently appears to be euvolemic at this time  Frequent intake and output monitoring  Maintain euvolemic status  Cardiology consult appreciated    Hypomagnesemiaresolved    Hypokalemiaresolved    Acute kidney injurycurrently resolved  Continue to trend serum creatinine  Nephrology consult appreciated    Atrial fibrillation with RVRcurrently resolved  Continue telemetry monitoring  Continue cardizem PO Q6h  Start Coumadin for [facility]  Follow cardiology recommendations    Hypertensionholding home antihypertensive medications    Vaginal bleedingof note patient found to have vaginal bleeding, status post total abdominal hysterectomy due to endometrial cancer, currently improved  Follow gynecology recommendations    ProphylaxisSCDs  FENregular diet, replete potassium and magnesium  DNR, patient surrogate decision-maker and power of  is daughter E-bet (updated at bedside)  Disposition -stable for dc to snf    Total non-critical care time spent 35 minutes

## 2021-08-01 NOTE — PROGRESS NOTES
Nephrology Consult    Patient: Delores Dillard MRN: [id number]  SSN: [id number]    YOB: 1933  Age: 80 y.o.   Sex: female      Subjective:   Pt seen in room  Cr 0.6  Na 141  Off IVF  Better bps after took her off of minoxidil    Past Medical History:   Diagnosis Date    [facility]     CAD (coronary artery disease)     Diabetes mellitus ([facility]     resolved    Endometrial cancer ([facility]     s/p TAHBSO, RT in 1990s    Hypertension     Skin cancer      Past Surgical History:   Procedure Laterality Date    HX HYSTERECTOMY      IR CHOLECYSTOSTOMY PERCUTANEOUS  7/27/2021      Family History   Problem Relation Age of Onset    Hypertension Father     Cancer Father     Hypertension Brother     Ovarian Cancer Other         daughter, unsure if genetic testing performed     Social History     Tobacco Use    Smoking status: Former Smoker    Smokeless tobacco: Never Used    Tobacco comment: quit >50 yrs ago   Substance Use Topics    Alcohol use: Not on file      Current Facility-Administered Medications   Medication Dose Route Frequency Provider Last Rate Last Admin    metoclopramide HCl (REGLAN) injection 10 mg  10 mg IntraVENous Q6H PRN Minus MD Celia        Warfarin - Pharmacy to Dose   Other Rx Dosing/Monitoring Minus MD Celia        [facility] 1 g in sterile water (preservative free) 20 mL IV syringe  1 g IntraVENous Anup Holcomb MD   1 g at 08/01/21 1129    heparin (porcine) injection 5,000 Units  5,000 Units SubCUTAneous Sin Branch MD   5,000 Units at 07/31/21 2110    dilTIAZem IR (CARDIZEM) tablet 30 mg  30 mg Oral Q6H Aydee Hay NP   30 mg at 08/01/21 1128    NOREPINephrine (LEVOPHED) 8 mg in 5% dextrose 250mL (32 mcg/mL) infusion  0.5-30 mcg/min IntraVENous TITRATE Minus MD Celia   Stopped at 07/28/21 0500    promethazine (PHENERGAN) tablet 25 mg  25 mg Oral Q4H PRN Sophia Felder MD Lainey   25 mg at 07/28/21 0518    DOBUTamine (DOBUTREX) 500 mg/250 mL (2,000 mcg/mL) infusion  0-10 mcg/kg/min IntraVENous TITRATE Mac Damico MD 2.6 mL/hr at 07/29/21 0146 1 mcg/kg/min at 07/29/21 0146    sodium chloride (NS) flush 5-40 mL  5-40 mL IntraVENous Q8H Lambert Kim NP   10 mL at 08/01/21 0557    sodium chloride (NS) flush 5-40 mL  5-40 mL IntraVENous PRN Lambert Kim NP        acetaminophen (TYLENOL) tablet 650 mg  650 mg Oral Q6H PRN Lambert Kim NP   650 mg at 07/29/21 0049    Or    acetaminophen (TYLENOL) suppository 650 mg  650 mg Rectal Q6H PRN Lambert Kim NP        polyethylene glycol (MIRALAX) packet 17 g  17 g Oral DAILY PRN Lambert Kim NP        bisacodyL (DULCOLAX) tablet 5 mg  5 mg Oral DAILY PRN Lambert Kim NP        famotidine (PEPCID) tablet 20 mg  20 mg Oral BID Lambert Kim NP   20 mg at 08/01/21 0850    atorvastatin (LIPITOR) tablet 20 mg  20 mg Oral DAILY Lambert Kim NP   20 mg at 08/01/21 0850    calcium-vitamin D 600 mg(1,500mg) -200 unit per tablet 1 Tablet  1 Tablet Oral BID Lambert Kim NP   1 Tablet at 08/01/21 0850    cholecalciferol (VITAMIN D3) (1000 Units /25 mcg) tablet 2,000 Units  2,000 Units Oral DAILY Lambert Kim NP   2,000 Units at 08/01/21 0850    isosorbide mononitrate ER (IMDUR) tablet 30 mg  30 mg Oral DAILY Lambert Kim NP   30 mg at 08/01/21 0851    levothyroxine (SYNTHROID) tablet 75 mcg  75 mcg Oral ACB Lambert Kim NP   75 mcg at 08/01/21 1043    [Held by provider] metoprolol succinate (TOPROL-XL) XL tablet 50 mg  50 mg Oral DAILY Lambert Kim NP   50 mg at 07/26/21 5821        Allergies   Allergen Reactions    Penicillins Unknown (comments)       Review of Systems:  A comprehensive review of systems was negative except for that written in the History of Present Illness.     Objective:     Vitals:    08/01/21 0727 08/01/21 0757 08/01/21 0856 08/01/21 1120 BP: 122/61   117/64   Pulse: 67   76   Resp: 18   20   Temp: 98.2 °F (36.8 °C)   97.5 °F (36.4 °C)   SpO2: 97% 97% 97% 96%   Weight:       Height:            Physical Exam:  General: NAD  Eyes: sclera anicteric  Oral Cavity: No thrush or ulcers  Neck: no JVD  Chest: Fair bilateral air entry  Heart: normal sounds  Abdomen: soft and  tender +  :  [place]+  Lower Extremities: no edema  Skin: no rash  Neuro: intact  Psychiatric: non-depressed            Assessment:     Hospital Problems  Never Reviewed        Codes Class Noted POA    Unstable angina (HCC) ICD-10-CM: I20.0  ICD-9-CM: 411.1  7/25/2021 Unknown        Chest pain with moderate risk for cardiac etiology ICD-10-CM: R07.9  ICD-9-CM: 786.50  7/25/2021 Unknown              Plan:   1 acute kidney injury:  -2/2 prerenal azotemia from sepsis/hypotension plus on diuretics and lisinopril.  -On admission creatinine was 0.9 and has bumped to 1.8 in the last 24 hours.   -Cr has improved to 0.6   -Clinically no evidence of volume overload. Chest x-ray is clear. -2D echocardiogram showed preserved LVEF. -Agree to hold lisinopril diuretics and nitrates. -off  IV fluids.    -No evidence of hydronephrosis per CT of the abdomen. 2.  Hypokalemia.    -From low p.o. intake/hypomagnesemia. -K was 3.2 and magnesium 1.3.    -replaced with K and IV magnesium sulfate.  -K 3.6, Mg 2.0    3. Septic shock:    -Admitted with right upper quadrant pain, WBC 21K, hypotensive. -CT abdomen showed distended gallbladder and fluid around diagnosed with acute cholecystitis s/p cholecystotomy   -GNR in urine and biliary culture, on iv meropenem  -On IV antibiotics. Off pressor. 4. anemia.    -Hemoglobin 8.9.   - I will send iron studies and ferritin level      5. A. fib. With RVR. on IV amiodarone drip. 6. DVT prophylaxis:  -changed lovenox to unfractionated sq hep    7. Hyponatremia:  -Na 128 (lab error)  -U Na 29  -Na 141     8.  HTN:  -low Bps   -better now  -dced mintyler    Signed By: Meenakshi Blake MD     August 1, 2021

## 2021-08-01 NOTE — PROGRESS NOTES
CARDIOLOGY PROGRESS NOTE     Patient seen and examined. This is a patient who is followed for chest pain. Patient has a history of hypertension, coronary artery disease, and atrial fibrillation. Patient is sleeping this am,  No other complaints reported. Telemetry reviewed, there were no events noted in the past 24 hours. Pertinent review of systems items noted above, all other systems are negative. Current medications reviewed. Physical Examination  Vital signs are stable. Blood pressure 122/61 , Pulse 67  No apparent distress. Heart is regular, rate and rhythm. Normal S1, S2, no murmurs are appreciated. Lungs are clear bilaterally. Abdomen is soft, nontender, normal bowel sounds. Extremities have no edema. Labs reviewed. Labs pending. 7/27/21 - 2- D echo:   · LV: Calculated LVEF is 65%. Normal cavity size, wall thickness, systolic function (ejection fraction normal) and diastolic function. Wall motion: normal. Normal left ventricular strain. · LA: Dilated left atrium. · TV: Mild to moderate tricuspid valve regurgitation is present. Diastolic function if indeterminate given presence of atrial fibrillation. Given age and dilated left atrium, likely has some degree of diastolic dysfunction. Case discussed with Dr. Akhil Brasher and our impression and recommendations are as follows:    1. Chest pain: No active chest pain. Troponins are negative x 4. Serial EKGs are nonischemic. ACS ruled out. Echocardiogram with preserved EF and no wall motion abnormalities. Continue telemetry monitoring. Isosorbide on hold due to low BP.      2. Atrial fibrillation: Continue amio gtt will transition to PO. Her CHADS2-VASc score is 4; will transition from heparin to coumadin. Continue telemetry monitoring. Keep serum potassium between 4-5 and serum magnesium > 2. Continue PO Cardizem and titrate as needed. 3. Sinus tachycardia: compensatory due to sepsis, cholecystitis. Hold all beta blockers    4. Hypotension: blood pressure appears close to goal. No longer requiring pressor support. 5. Acute cholecystis: surgery on the case. Given tachycardia and negative trops x4, with normal TTE, no further risk stratification needed prior to procedure/surgery.     Please do not hesitate to call me     Brooklyn Blanca MD, Geoffrey Burk, RASHI  Structural Heart Disease  Endovascular and Vascular Medicine  Interventional Cardiology  [facility] Cardiology  [phone]

## 2021-08-01 NOTE — DISCHARGE SUMMARY
Hospitalist Discharge Summary     Patient ID:  Gwjoseph Judd  [id number]  80 y.o.  2/16/1933 7/25/2021    PCP on record: Kylie Grant NP    Admit date: 7/25/2021  Discharge date and time: 8/1/2021    DISCHARGE DIAGNOSIS:    Septic shock secondary to cholecystitis/urinary tract infection/acute decompensated heart failure preserved ejection fraction/hypomagnesemia/hypokalemia/acute kidney injury/atrial fibrillation with RVR/hypertension    CONSULTATIONS:  IP CONSULT TO CARDIOLOGY  IP CONSULT TO OB GYN  IP CONSULT TO GASTROENTEROLOGY  IP CONSULT TO INFECTIOUS DISEASES  IP CONSULT TO NEPHROLOGY  IP CONSULT TO GENERAL SURGERY    Excerpted HPI from H&P of Denise Valderrama MD:  Alaina Otero a 80 y. o. female with a  past medical history of HTN, CAD, and atrial fibrillation, who presented to the ED with sharp, substernal chest pain and associated nausea and found to be in atrial fibrillation with RVR. Marlon Sen Patient additionally reported experiencing vaginal bleeding while on Eliquis and now on warfarin, she has a gynecology appointment scheduled for evaluation. Given ASA, pain medication and nitroglycerin with relief. Patient has leukocytosis of unclear etiology with an increasing white blood cell count since admission. Tenderness in the right upper quadrant as well as left upper quadrant. CT scan of the abdomen pelvis pending. Right upper quadrant ultrasound pending. Prophylactically will treat with Rocephin. Urine culture and blood culture pending. INR subtherapeutic at 1.4, BNP 4475. x4 Troponins negative. EKG showed afib. CXR showed central pulmonary vascular prominence and coarse interstitial opacities. Chest pain resolved. Cardiology consulted.  Echo revealed LVEF 65%, dilated left atrium, overnight patient became increasingly hypotensive, was transferred to the ICU, central line placed, obtained CT abdomen pelvis showing biliary sludge, urinalysis consistent with urinary tract infection, patient now s/p cholecystostomy tube, doing well on Meropenem, atrial fibrillation with RVR has resolved, patient had an episode of agitation overnight, likely secondary to sundowning versus ICU delirium, up for stepdown    ______________________________________________________________________  DISCHARGE SUMMARY/HOSPITAL COURSE:  for full details see H&P, daily progress notes, labs, consult notes. Patient was subsequently admitted to [facility] for evaluation as well as management, patient was found to have septic shock secondary to cholecystitis, patient was evaluated by cardiology as well as general surgery as well as infectious ease, cholecystostomy tube was placed, patient was on pressors, patient was already on IV antibiotics, patient did well in postoperative period, during. Of septic shock patient was in atrial fibrillation with RVR started on amiodarone drip, subsequently resolved with resolution of sepsis, patient was started on oral Cardizem as per cardiology recommendations, patient was evaluated by physical therapy as well as Occupational Therapy as patient's clinical status overall improved patient was deemed stable for discharge and close outpatient follow-up with primary care physician/general surgery/nephrology/cardiology, the plan was explained to the patient and patient's daughter in detail who are agreeable to current plan.        _______________________________________________________________________  Patient seen and examined by me on discharge day.   Pertinent Findings:  Gen:    Not in distress  Chest: Clear lungs  CVS:   Regular rhythm, s1/s2 no m/r/g  No edema  Abd:  Soft, not distended, not tender  Neuro:  Alert, Oriented x 4  _______________________________________________________________________  DISCHARGE MEDICATIONS:   Current Discharge Medication List      START taking these medications    Details   acetaminophen (TYLENOL) 325 mg tablet Take 2 Tablets by mouth every six (6) hours as needed for Pain or Fever. Qty: 60 Tablet, Refills: 0  Start date: 7/31/2021      dilTIAZem IR (CARDIZEM) 30 mg tablet Take 1 Tablet by mouth every six (6) hours. Qty: 120 Tablet, Refills: 0  Start date: 7/31/2021      levoFLOXacin (Levaquin) 750 mg tablet Take 1 Tablet by mouth daily. Qty: 10 Tablet, Refills: 0  Start date: 7/31/2021         CONTINUE these medications which have NOT CHANGED    Details   warfarin (Coumadin) 5 mg tablet Take 5 mg by mouth daily. atorvastatin (LIPITOR) 20 mg tablet Take 20 mg by mouth daily. niacin (NIASPAN) 1,000 mg Tb24 tab Take 1,000 mg by mouth nightly. minoxidiL (LONITEN) 10 mg tablet Take 5 mg by mouth daily. isosorbide mononitrate ER (IMDUR) 30 mg tablet Take 30 mg by mouth daily. cholecalciferol, vitamin D3, (Vitamin D3) 50 mcg (2,000 unit) tab Take 2,000 Units by mouth. Calcium-Cholecalciferol, D3, 600 mg(1,500mg) -400 unit chew Take 1,500 mg by mouth.      levothyroxine (SYNTHROID) 75 mcg tablet Take 75 mcg by mouth Daily (before breakfast). STOP taking these medications       lisinopriL (PRINIVIL, ZESTRIL) 20 mg tablet Comments:   Reason for Stopping:         metoprolol succinate (TOPROL-XL) 50 mg XL tablet Comments:   Reason for Stopping:         hydroCHLOROthiazide (HYDRODIURIL) 25 mg tablet Comments:   Reason for Stopping:                 Patient Follow Up Instructions: Activity: PT/OT Eval and Treat  Diet: Dysphagia diet-pureed  Wound Care: As directed    Follow-up with PCP/Cardiology/General Surgery in 2 weeks.   Follow-up tests/labs As per above  physicians  Follow-up Information     Follow up With Specialties Details Why Contact Info    Maira Ortiz NP Nurse Practitioner In 1 week  [address] [facility] [id number]  [facility]      Burgess Luis MD Cardiology In 1 week  [address] [place]      Dee Gallegos MD Colon and Rectal Surgery, General Surgery In 1 week  [address]  [phone]      Bi Win MD Nephrology, Nephrology In 1 week  [facility]  [phone]          ________________________________________________________________    Risk of deterioration: Low    Condition at Discharge:  Stable  __________________________________________________________________    Disposition  SNF/LTC    ____________________________________________________________________    Code Status: Full Code  ___________________________________________________________________      Total time in minutes spent coordinating this discharge (includes going over instructions, follow-up, prescriptions, and preparing report for sign off to her PCP) :  45 minutes    Signed:   Sharad Casper MD

## 2021-08-01 NOTE — PROGRESS NOTES
Day:03  INR goal of 2-3. INR today was 1.3     Order entered for  Warfarin  5 (mg) ordered to be given today at 18:00. Patient will likely be discharged today.

## 2021-08-01 NOTE — PROGRESS NOTES
Hospitalist Progress Note    Subjective:   Daily Progress Note: 8/1/2021 8:49 AM    Hospital Course:  Viviana Gonzales is a 80 y.o. female with a  past medical history of HTN, CAD, and atrial fibrillation, who presented to the ED with sharp, substernal chest pain and associated nausea and found to be in atrial fibrillation with RVR. Bowiebelen Vasquez Patient additionally reported experiencing vaginal bleeding while on Eliquis and now on warfarin, she has a gynecology appointment scheduled for evaluation. Given ASA, pain medication and nitroglycerin with relief. Patient has leukocytosis of unclear etiology with an increasing white blood cell count since admission. Tenderness in the right upper quadrant as well as left upper quadrant. CT scan of the abdomen pelvis pending. Right upper quadrant ultrasound pending. Prophylactically will treat with Rocephin. Urine culture and blood culture pending. INR subtherapeutic at 1.4, BNP 4475. x4 Troponins negative. EKG showed afib. CXR showed central pulmonary vascular prominence and coarse interstitial opacities. Chest pain resolved. Cardiology consulted.  Echo revealed LVEF 65%, dilated left atrium, overnight patient became increasingly hypotensive, was transferred to the ICU, central line placed, obtained CT abdomen pelvis showing biliary sludge, urinalysis consistent with urinary tract infection, patient now s/p cholecystostomy tube, doing well on Meropenem, atrial fibrillation with RVR has resolved, patient had an episode of agitation overnight, likely secondary to sundowning versus ICU delirium, up for stepdown    Subjective:   Patient seen and evaluated at bedside, patient currently has no active complaints, discussed with RN  Current Facility-Administered Medications   Medication Dose Route Frequency    metoclopramide HCl (REGLAN) injection 10 mg  10 mg IntraVENous Q6H PRN    Warfarin - Pharmacy to Dose   Other Rx Dosing/Monitoring    meropenem (MERREM) 1 g in sterile water (preservative free) 20 mL IV syringe  1 g IntraVENous Q8H    heparin (porcine) injection 5,000 Units  5,000 Units SubCUTAneous Q12H    dilTIAZem IR (CARDIZEM) tablet 30 mg  30 mg Oral Q6H    NOREPINephrine (LEVOPHED) 8 mg in 5% dextrose 250mL (32 mcg/mL) infusion  0.5-30 mcg/min IntraVENous TITRATE    promethazine (PHENERGAN) tablet 25 mg  25 mg Oral Q4H PRN    DOBUTamine (DOBUTREX) 500 mg/250 mL (2,000 mcg/mL) infusion  0-10 mcg/kg/min IntraVENous TITRATE    sodium chloride (NS) flush 5-40 mL  5-40 mL IntraVENous Q8H    sodium chloride (NS) flush 5-40 mL  5-40 mL IntraVENous PRN    acetaminophen (TYLENOL) tablet 650 mg  650 mg Oral Q6H PRN    Or    acetaminophen (TYLENOL) suppository 650 mg  650 mg Rectal Q6H PRN    polyethylene glycol (MIRALAX) packet 17 g  17 g Oral DAILY PRN    bisacodyL (DULCOLAX) tablet 5 mg  5 mg Oral DAILY PRN    famotidine (PEPCID) tablet 20 mg  20 mg Oral BID    atorvastatin (LIPITOR) tablet 20 mg  20 mg Oral DAILY    calcium-vitamin D 600 mg(1,500mg) -200 unit per tablet 1 Tablet  1 Tablet Oral BID    cholecalciferol (VITAMIN D3) (1000 Units /25 mcg) tablet 2,000 Units  2,000 Units Oral DAILY    isosorbide mononitrate ER (IMDUR) tablet 30 mg  30 mg Oral DAILY    levothyroxine (SYNTHROID) tablet 75 mcg  75 mcg Oral ACB    [Held by provider] metoprolol succinate (TOPROL-XL) XL tablet 50 mg  50 mg Oral DAILY        Review of Systems  Review of Systems   Constitutional: Negative for chills, fever and weight loss. HENT: Negative. Eyes: Negative. Respiratory: Negative for cough, sputum production, shortness of breath and wheezing. Cardiovascular: Negative for chest pain, palpitations, orthopnea and leg swelling. Gastrointestinal: Positive for abdominal pain (minimal, epigastric and RUQ) and nausea. Negative for constipation, diarrhea and vomiting. Genitourinary: Negative for dysuria, frequency and urgency. Musculoskeletal: Negative. Neurological: Negative. Objective:     Visit Vitals  /61 (BP 1 Location: Left upper arm, BP Patient Position: At rest;Supine)   Pulse 67   Temp 98.2 °F (36.8 °C)   Resp 18   Ht 5' 4\" (1.626 m)   Wt 85.3 kg (188 lb)   SpO2 97%   Breastfeeding No   BMI 32.27 kg/m²    O2 Flow Rate (L/min): 2 l/min O2 Device: Nasal cannula    Temp (24hrs), Av.9 °F (36.6 °C), Min:97.7 °F (36.5 °C), Max:98.2 °F (36.8 °C)      No intake/output data recorded. 1901 -  0700  In: 50 [I.V.:50]  Out:  [Urine:1600; Drains:390]    Recent Results (from the past 24 hour(s))   CBC WITH AUTOMATED DIFF    Collection Time: 21  7:28 AM   Result Value Ref Range    WBC 8.8 3.6 - 11.0 K/uL    RBC 3.39 (L) 3.80 - 5.20 M/uL    HGB 10.1 (L) 11.5 - 16.0 g/dL    HCT 30.6 (L) 35.0 - 47.0 %    MCV 90.3 80.0 - 99.0 FL    MCH 29.8 26.0 - 34.0 PG    MCHC 33.0 30.0 - 36.5 g/dL    RDW 16.8 (H) 11.5 - 14.5 %    PLATELET 978 473 - 442 K/uL    MPV 10.0 8.9 - 12.9 FL    NRBC 0.2 (H) 0.0  WBC    ABSOLUTE NRBC 0.02 (H) 0.00 - 0.01 K/uL    NEUTROPHILS PENDING %    LYMPHOCYTES PENDING %    MONOCYTES PENDING %    EOSINOPHILS PENDING %    BASOPHILS PENDING %    IMMATURE GRANULOCYTES PENDING %    ABS. NEUTROPHILS PENDING K/UL    ABS. LYMPHOCYTES PENDING K/UL    ABS. MONOCYTES PENDING K/UL    ABS. EOSINOPHILS PENDING K/UL    ABS. BASOPHILS PENDING K/UL    ABS. IMM. GRANS.  PENDING K/UL    DF PENDING    METABOLIC PANEL, COMPREHENSIVE    Collection Time: 21  7:28 AM   Result Value Ref Range    Sodium 141 136 - 145 mmol/L    Potassium 3.8 3.5 - 5.1 mmol/L    Chloride 105 97 - 108 mmol/L    CO2 33 (H) 21 - 32 mmol/L    Anion gap 3 (L) 5 - 15 mmol/L    Glucose 86 65 - 100 mg/dL    BUN 16 6 - 20 mg/dL    Creatinine 0.60 0.55 - 1.02 mg/dL    BUN/Creatinine ratio 27 (H) 12 - 20      GFR est AA >60 >60 ml/min/1.73m2    GFR est non-AA >60 >60 ml/min/1.73m2    Calcium 8.7 8.5 - 10.1 mg/dL    Bilirubin, total 0.7 0.2 - 1.0 mg/dL    AST (SGOT) 22 15 - 37 U/L    ALT (SGPT) 38 12 - 78 U/L    Alk. phosphatase 83 45 - 117 U/L    Protein, total 5.6 (L) 6.4 - 8.2 g/dL    Albumin 2.2 (L) 3.5 - 5.0 g/dL    Globulin 3.4 2.0 - 4.0 g/dL    A-G Ratio 0.6 (L) 1.1 - 2.2          XR CHEST PORT   Final Result      XR CHEST PORT   Final Result      IR CHOLECYSTOSTOMY PERCUTANEOUS   Final Result   Successful ultrasound and fluoroscopic guided aspiration and   percutaneous cholecystostomy tube placement. CT ABD PELV WO CONT   Final Result   Findings are most suggestive of cholecystitis given the stranding   and fluid centered in the region of the gallbladder. Duodenitis and pancreatitis   might be considered in the differential given the location. Nonobstructing right   nephrolithiasis, bilateral renal cysts. Findings in the lung bases suggestive of   CHF/pulmonary edema with bilateral effusions and compressive bilateral lower   lobe atelectasis. Three-vessel coronary arterial calcifications. Diffuse   aortoiliac arteriosclerotic calcifications. XR CHEST PORT   Final Result      US RUQ   Final Result   Gallbladder sludge, without demonstrated stones      XR CHEST PORT   Final Result      XR CHEST PORT   Final Result   Central pulmonary vascular prominence likely accounting for the fullness in the   beatrice. There are also likely coarsened interstitial opacities that could   represent early interstitial edema or other interstitial process. No focal   airspace consolidation is evident. PHYSICAL EXAM:    Physical Exam  Vitals reviewed. Constitutional:       General: She is not in acute distress. Appearance: She is not ill-appearing or diaphoretic. HENT:      Head: Normocephalic and atraumatic. Mouth/Throat:      Mouth: Mucous membranes are moist.      Pharynx: Oropharynx is clear. Eyes:      Conjunctiva/sclera: Conjunctivae normal.      Pupils: Pupils are equal, round, and reactive to light. Cardiovascular:      Rate and Rhythm: Normal rate and regular rhythm. Pulses: Normal pulses. Heart sounds: Normal heart sounds. Pulmonary:      Effort: Pulmonary effort is normal.      Breath sounds: Normal breath sounds. No wheezing, rhonchi or rales. Chest:      Chest wall: No tenderness. Abdominal:      General: Abdomen is flat. There is no distension. Palpations: Abdomen is soft. There is no mass. Tenderness: There is abdominal tenderness (epigastric, moderate to palpation). Musculoskeletal:         General: No tenderness or deformity. Normal range of motion. Skin:     General: Skin is warm. Neurological:      General: No focal deficit present. Mental Status: She is alert and oriented to person, place, and time. Data Review    Recent Results (from the past 24 hour(s))   CBC WITH AUTOMATED DIFF    Collection Time: 08/01/21  7:28 AM   Result Value Ref Range    WBC 8.8 3.6 - 11.0 K/uL    RBC 3.39 (L) 3.80 - 5.20 M/uL    HGB 10.1 (L) 11.5 - 16.0 g/dL    HCT 30.6 (L) 35.0 - 47.0 %    MCV 90.3 80.0 - 99.0 FL    MCH 29.8 26.0 - 34.0 PG    MCHC 33.0 30.0 - 36.5 g/dL    RDW 16.8 (H) 11.5 - 14.5 %    PLATELET 528 527 - 467 K/uL    MPV 10.0 8.9 - 12.9 FL    NRBC 0.2 (H) 0.0  WBC    ABSOLUTE NRBC 0.02 (H) 0.00 - 0.01 K/uL    NEUTROPHILS PENDING %    LYMPHOCYTES PENDING %    MONOCYTES PENDING %    EOSINOPHILS PENDING %    BASOPHILS PENDING %    IMMATURE GRANULOCYTES PENDING %    ABS. NEUTROPHILS PENDING K/UL    ABS. LYMPHOCYTES PENDING K/UL    ABS. MONOCYTES PENDING K/UL    ABS. EOSINOPHILS PENDING K/UL    ABS. BASOPHILS PENDING K/UL    ABS. IMM. GRANS.  PENDING K/UL    DF PENDING    METABOLIC PANEL, COMPREHENSIVE    Collection Time: 08/01/21  7:28 AM   Result Value Ref Range    Sodium 141 136 - 145 mmol/L    Potassium 3.8 3.5 - 5.1 mmol/L    Chloride 105 97 - 108 mmol/L    CO2 33 (H) 21 - 32 mmol/L    Anion gap 3 (L) 5 - 15 mmol/L    Glucose 86 65 - 100 mg/dL    BUN 16 6 - 20 mg/dL    Creatinine 0.60 0.55 - 1.02 mg/dL    BUN/Creatinine ratio 27 (H) 12 - 20      GFR est AA >60 >60 ml/min/1.73m2    GFR est non-AA >60 >60 ml/min/1.73m2    Calcium 8.7 8.5 - 10.1 mg/dL    Bilirubin, total 0.7 0.2 - 1.0 mg/dL    AST (SGOT) 22 15 - 37 U/L    ALT (SGPT) 38 12 - 78 U/L    Alk. phosphatase 83 45 - 117 U/L    Protein, total 5.6 (L) 6.4 - 8.2 g/dL    Albumin 2.2 (L) 3.5 - 5.0 g/dL    Globulin 3.4 2.0 - 4.0 g/dL    A-G Ratio 0.6 (L) 1.1 - 2.2          Assessment/Plan:     Active Problems:    Unstable angina ([address] (7/25/2021)      Chest pain with moderate risk for cardiac etiology (7/25/2021)      Impression:     1. Chest pain r/o ACS  2. CHF  3. A-fib  4. CAD  5. Hypertension  6. Hyperlipidemia  7. Hypothyroidism  8. Leukocytosis  9. Hypokalemia  10.  Vaginal bleeding     Plan:    Septic shock secondary to cholecystitisof note patient found to be in septic shock yesterday not responding to fluid resuscitation, currently now weaned off pressors based on patient's clinical presentation multifactorial including secondary to cholecystitis based on CT abdomen pelvis results versus urinary tract infection, currently s/p cholecystostomy tube placement, doing well, currently off of pressors  Follow-up blood cultures  Continue Meropenem for antibiotic coverage  General surgery consult appreciated  Infectious Disease consult appreciated    Urinary tract infectionpatient presents with septic shock with likely component of urinary tract infection given urinalysis  Follow blood cultures  Weaned off pressors  Follow-up urine culture  Continue Meropenem for antibiotic coverage    Acute decompensated heart failure with preserved ejection fractionpatient currently appears to be euvolemic at this time  Frequent intake and output monitoring  Maintain euvolemic status  Cardiology consult appreciated    Hypomagnesemiaresolved    Hypokalemiaresolved    Acute kidney injurycurrently resolved  Continue to trend serum creatinine  Nephrology consult appreciated    Atrial fibrillation with RVRcurrently resolved  Continue telemetry monitoring  Continue cardizem PO Q6h  Start Coumadin for [facility]  Follow cardiology recommendations    Hypertensionholding home antihypertensive medications    Vaginal bleedingof note patient found to have vaginal bleeding, status post total abdominal hysterectomy due to endometrial cancer, currently improved  Follow gynecology recommendations    ProphylaxisSCDs  FENregular diet, replete potassium and magnesium  DNR, patient surrogate decision-maker and power of  is daughter E-bet (updated at bedside)  Disposition -stable for dc to snf    Total non-critical care time spent 35 minutes

## 2021-08-02 VITALS
DIASTOLIC BLOOD PRESSURE: 54 MMHG | HEART RATE: 85 BPM | HEIGHT: 64 IN | WEIGHT: 188 LBS | OXYGEN SATURATION: 98 % | BODY MASS INDEX: 32.1 KG/M2 | RESPIRATION RATE: 22 BRPM | TEMPERATURE: 97.9 F | SYSTOLIC BLOOD PRESSURE: 125 MMHG

## 2021-08-02 LAB
ALBUMIN SERPL-MCNC: 2.3 G/DL (ref 3.5–5)
ALBUMIN/GLOB SERPL: 0.7 {RATIO} (ref 1.1–2.2)
ALP SERPL-CCNC: 75 U/L (ref 45–117)
ALT SERPL-CCNC: 34 U/L (ref 12–78)
ANION GAP SERPL CALC-SCNC: 5 MMOL/L (ref 5–15)
AST SERPL W P-5'-P-CCNC: 19 U/L (ref 15–37)
BACTERIA SPEC CULT: NORMAL
BASOPHILS # BLD: 0.2 K/UL (ref 0–0.1)
BASOPHILS NFR BLD: 2 % (ref 0–1)
BILIRUB SERPL-MCNC: 0.7 MG/DL (ref 0.2–1)
BUN SERPL-MCNC: 13 MG/DL (ref 6–20)
BUN/CREAT SERPL: 22 (ref 12–20)
CA-I BLD-MCNC: 9 MG/DL (ref 8.5–10.1)
CHLORIDE SERPL-SCNC: 104 MMOL/L (ref 97–108)
CO2 SERPL-SCNC: 32 MMOL/L (ref 21–32)
CREAT SERPL-MCNC: 0.6 MG/DL (ref 0.55–1.02)
CRP SERPL-MCNC: 4.75 MG/DL (ref 0–0.6)
DIFFERENTIAL METHOD BLD: ABNORMAL
EOSINOPHIL # BLD: 0.1 K/UL (ref 0–0.4)
EOSINOPHIL NFR BLD: 1 % (ref 0–7)
ERYTHROCYTE [DISTWIDTH] IN BLOOD BY AUTOMATED COUNT: 16.8 % (ref 11.5–14.5)
GLOBULIN SER CALC-MCNC: 3.3 G/DL (ref 2–4)
GLUCOSE SERPL-MCNC: 95 MG/DL (ref 65–100)
HCT VFR BLD AUTO: 31.5 % (ref 35–47)
HGB BLD-MCNC: 10.2 G/DL (ref 11.5–16)
IMM GRANULOCYTES # BLD AUTO: 0 K/UL
IMM GRANULOCYTES NFR BLD AUTO: 0 %
INR PPP: 1.6 (ref 0.9–1.1)
LYMPHOCYTES # BLD: 1.5 K/UL (ref 0.8–3.5)
LYMPHOCYTES NFR BLD: 20 % (ref 12–49)
MCH RBC QN AUTO: 29.1 PG (ref 26–34)
MCHC RBC AUTO-ENTMCNC: 32.4 G/DL (ref 30–36.5)
MCV RBC AUTO: 89.7 FL (ref 80–99)
METAMYELOCYTES NFR BLD MANUAL: 2 %
MONOCYTES # BLD: 0.4 K/UL (ref 0–1)
MONOCYTES NFR BLD: 5 % (ref 5–13)
MYELOCYTES NFR BLD MANUAL: 1 %
NEUTS SEG # BLD: 5.2 K/UL (ref 1.8–8)
NEUTS SEG NFR BLD: 69 % (ref 32–75)
NRBC # BLD: 0 K/UL (ref 0–0.01)
NRBC BLD-RTO: 0 PER 100 WBC
PLATELET # BLD AUTO: 332 K/UL (ref 150–400)
PLATELET COMMENTS,PCOM: ABNORMAL
PMV BLD AUTO: 9.5 FL (ref 8.9–12.9)
POTASSIUM SERPL-SCNC: 4.1 MMOL/L (ref 3.5–5.1)
PROCALCITONIN SERPL-MCNC: 0.14 NG/ML
PROT SERPL-MCNC: 5.6 G/DL (ref 6.4–8.2)
PROTHROMBIN TIME: 18.1 SEC (ref 11.9–14.7)
RBC # BLD AUTO: 3.51 M/UL (ref 3.8–5.2)
RBC MORPH BLD: ABNORMAL
RBC MORPH BLD: ABNORMAL
SODIUM SERPL-SCNC: 141 MMOL/L (ref 136–145)
SPECIAL REQUESTS,SREQ: NORMAL
WBC # BLD AUTO: 7.5 K/UL (ref 3.6–11)

## 2021-08-02 PROCEDURE — 84145 PROCALCITONIN (PCT): CPT

## 2021-08-02 PROCEDURE — 36415 COLL VENOUS BLD VENIPUNCTURE: CPT

## 2021-08-02 PROCEDURE — 80053 COMPREHEN METABOLIC PANEL: CPT

## 2021-08-02 PROCEDURE — 85610 PROTHROMBIN TIME: CPT

## 2021-08-02 PROCEDURE — 74011250637 HC RX REV CODE- 250/637: Performed by: NURSE PRACTITIONER

## 2021-08-02 PROCEDURE — 94760 N-INVAS EAR/PLS OXIMETRY 1: CPT

## 2021-08-02 PROCEDURE — 86140 C-REACTIVE PROTEIN: CPT

## 2021-08-02 PROCEDURE — 85025 COMPLETE CBC W/AUTO DIFF WBC: CPT

## 2021-08-02 PROCEDURE — 74011250636 HC RX REV CODE- 250/636: Performed by: INTERNAL MEDICINE

## 2021-08-02 PROCEDURE — 74011000250 HC RX REV CODE- 250: Performed by: INTERNAL MEDICINE

## 2021-08-02 PROCEDURE — 99232 SBSQ HOSP IP/OBS MODERATE 35: CPT | Performed by: INTERNAL MEDICINE

## 2021-08-02 RX ADMIN — MEROPENEM 1 G: 1 INJECTION, POWDER, FOR SOLUTION INTRAVENOUS at 05:06

## 2021-08-02 RX ADMIN — FAMOTIDINE 20 MG: 20 TABLET ORAL at 09:39

## 2021-08-02 RX ADMIN — Medication 2000 UNITS: at 09:39

## 2021-08-02 RX ADMIN — ATORVASTATIN CALCIUM 20 MG: 20 TABLET, FILM COATED ORAL at 09:39

## 2021-08-02 RX ADMIN — ISOSORBIDE MONONITRATE 30 MG: 30 TABLET, EXTENDED RELEASE ORAL at 09:40

## 2021-08-02 RX ADMIN — DILTIAZEM HYDROCHLORIDE 30 MG: 30 TABLET, FILM COATED ORAL at 05:06

## 2021-08-02 RX ADMIN — Medication 10 ML: at 05:07

## 2021-08-02 RX ADMIN — HEPARIN SODIUM 5000 UNITS: 5000 INJECTION INTRAVENOUS; SUBCUTANEOUS at 09:40

## 2021-08-02 RX ADMIN — LEVOTHYROXINE SODIUM 75 MCG: 75 TABLET ORAL at 09:39

## 2021-08-02 RX ADMIN — Medication 1 TABLET: at 09:39

## 2021-08-02 NOTE — PROGRESS NOTES
CM attempted to contact [facility] admissions, [phone], however CM notified that admissions is off today and the , Deondre Rodrgiuez, was covering admission calls. CM left Deondre Rodriguez detailed VM requesting room number for patient to admit to the facility, awaiting callback at this time.

## 2021-08-02 NOTE — DISCHARGE SUMMARY
Hospitalist Discharge Summary     Patient ID:  Alysia Judd  [id number]  80 y.o.  2/16/1933 7/25/2021    PCP on record: Reina Burgos NP    Admit date: 7/25/2021  Discharge date and time: 8/2/2021    DISCHARGE DIAGNOSIS:    Septic shock secondary to cholecystitis/urinary tract infection/acute decompensated heart failure preserved ejection fraction/hypomagnesemia/hypokalemia/acute kidney injury/atrial fibrillation with RVR/hypertension    CONSULTATIONS:  IP CONSULT TO CARDIOLOGY  IP CONSULT TO OB GYN  IP CONSULT TO GASTROENTEROLOGY  IP CONSULT TO INFECTIOUS DISEASES  IP CONSULT TO NEPHROLOGY  IP CONSULT TO GENERAL SURGERY    Excerpted HPI from H&P of Denise Valderrama MD:  Alaina Otero a 80 y. o. female with a  past medical history of HTN, CAD, and atrial fibrillation, who presented to the ED with sharp, substernal chest pain and associated nausea and found to be in atrial fibrillation with RVR. Marlon Sen Patient additionally reported experiencing vaginal bleeding while on Eliquis and now on warfarin, she has a gynecology appointment scheduled for evaluation. Given ASA, pain medication and nitroglycerin with relief. Patient has leukocytosis of unclear etiology with an increasing white blood cell count since admission. Tenderness in the right upper quadrant as well as left upper quadrant. CT scan of the abdomen pelvis pending. Right upper quadrant ultrasound pending. Prophylactically will treat with Rocephin. Urine culture and blood culture pending. INR subtherapeutic at 1.4, BNP 4475. x4 Troponins negative. EKG showed afib. CXR showed central pulmonary vascular prominence and coarse interstitial opacities. Chest pain resolved. Cardiology consulted.  Echo revealed LVEF 65%, dilated left atrium, overnight patient became increasingly hypotensive, was transferred to the ICU, central line placed, obtained CT abdomen pelvis showing biliary sludge, urinalysis consistent with urinary tract infection, patient now s/p cholecystostomy tube, doing well on Meropenem, atrial fibrillation with RVR has resolved, patient had an episode of agitation overnight, likely secondary to sundowning versus ICU delirium, up for stepdown    ______________________________________________________________________  DISCHARGE SUMMARY/HOSPITAL COURSE:  for full details see H&P, daily progress notes, labs, consult notes. Patient was subsequently admitted to [facility] for evaluation as well as management, patient was found to have septic shock secondary to cholecystitis, patient was evaluated by cardiology as well as general surgery as well as infectious ease, cholecystostomy tube was placed, patient was on pressors, patient was already on IV antibiotics, patient did well in postoperative period, during. Of septic shock patient was in atrial fibrillation with RVR started on amiodarone drip, subsequently resolved with resolution of sepsis, patient was started on oral Cardizem as per cardiology recommendations, patient was evaluated by physical therapy as well as Occupational Therapy as patient's clinical status overall improved patient was deemed stable for discharge and close outpatient follow-up with primary care physician/general surgery/nephrology/cardiology, the plan was explained to the patient and patient's daughter in detail who are agreeable to current plan.        _______________________________________________________________________  Patient seen and examined by me on discharge day.   Pertinent Findings:  Gen:    Not in distress  Chest: Clear lungs  CVS:   Regular rhythm, s1/s2 no m/r/g  No edema  Abd:  Soft, not distended, not tender  Neuro:  Alert, Oriented x 4  _______________________________________________________________________  DISCHARGE MEDICATIONS:   Current Discharge Medication List      START taking these medications    Details   acetaminophen (TYLENOL) 325 mg tablet Take 2 Tablets by mouth every six (6) hours as needed for Pain or Fever. Qty: 60 Tablet, Refills: 0  Start date: 7/31/2021      dilTIAZem IR (CARDIZEM) 30 mg tablet Take 1 Tablet by mouth every six (6) hours. Qty: 120 Tablet, Refills: 0  Start date: 7/31/2021      levoFLOXacin (Levaquin) 750 mg tablet Take 1 Tablet by mouth daily. Qty: 10 Tablet, Refills: 0  Start date: 7/31/2021         CONTINUE these medications which have NOT CHANGED    Details   warfarin (Coumadin) 5 mg tablet Take 5 mg by mouth daily. atorvastatin (LIPITOR) 20 mg tablet Take 20 mg by mouth daily. niacin (NIASPAN) 1,000 mg Tb24 tab Take 1,000 mg by mouth nightly. minoxidiL (LONITEN) 10 mg tablet Take 5 mg by mouth daily. isosorbide mononitrate ER (IMDUR) 30 mg tablet Take 30 mg by mouth daily. cholecalciferol, vitamin D3, (Vitamin D3) 50 mcg (2,000 unit) tab Take 2,000 Units by mouth. Calcium-Cholecalciferol, D3, 600 mg(1,500mg) -400 unit chew Take 1,500 mg by mouth.      levothyroxine (SYNTHROID) 75 mcg tablet Take 75 mcg by mouth Daily (before breakfast). STOP taking these medications       lisinopriL (PRINIVIL, ZESTRIL) 20 mg tablet Comments:   Reason for Stopping:         metoprolol succinate (TOPROL-XL) 50 mg XL tablet Comments:   Reason for Stopping:         hydroCHLOROthiazide (HYDRODIURIL) 25 mg tablet Comments:   Reason for Stopping:                 Patient Follow Up Instructions: Activity: PT/OT Eval and Treat  Diet: Dysphagia diet-pureed  Wound Care: As directed    Follow-up with PCP/Cardiology/General Surgery in 2 weeks.   Follow-up tests/labs As per above  physicians  Follow-up Information     Follow up With Specialties Details Why Contact Info    Nilson Gutierrez NP Nurse Practitioner In 1 week  [address] [facility] [address] [place] [address]      Jose Cleaning MD Cardiology In 1 week  [address]      Usha Whitaker MD Colon and Rectal Surgery, General Surgery In 1 week  [address]  [phone]      Benny Starr MD Nephrology, Nephrology In 1 week  [facility]  [phone]          ________________________________________________________________    Risk of deterioration: Low    Condition at Discharge:  Stable  __________________________________________________________________    Disposition  SNF/LTC    ____________________________________________________________________    Code Status: Full Code  ___________________________________________________________________      Total time in minutes spent coordinating this discharge (includes going over instructions, follow-up, prescriptions, and preparing report for sign off to her PCP) :  45 minutes    Signed:   Cecilia Good MD

## 2021-08-02 NOTE — ROUTINE PROCESS
DISCHARGE INSTRUCTIONS reviewed with patient ant pt's daughter, both verbalized understanding. IV removed without difficulty, pt tolerated well. Discussed follow up appointments and medications. pt vs stable, see flow sheet. pt alert,oreinted x 4, denies pain or discomfort. Drain to RUQ abd intact and remains in for discharge to [facility]. [facility]

## 2021-08-02 NOTE — PROGRESS NOTES
Progress Note    Patient: Ana Lilia Mireles MRN: [id number]  SSN: [id number]    YOB: 1933  Age: 80 y.o. Sex: female      Admit Date: 7/25/2021    LOS: 8 days     Subjective:   Patient followed septic shock with UTI and  Cholecystitis secondary to E. coli. She is afebrile with normal WBC and decreasing procal and CRP. Currently on Meropenem alone. It appears that she has been discharged to [facility]. Objective:     Vitals:    08/02/21 0439 08/02/21 0847 08/02/21 1039 08/02/21 1235   BP: 120/68 (!) 147/59  (!) 125/54   Pulse: 90 89  85   Resp: 18 22 22   Temp: 97.8 °F (36.6 °C) 98.2 °F (36.8 °C)  97.9 °F (36.6 °C)   SpO2:   98% 98%   Weight:       Height:            Intake and Output:  Current Shift: 08/02 0701 - 08/02 1900  In: 0   Out: 150 [Drains:150]  Last three shifts: 07/31 1901 - 08/02 0700  In: 10 [I.V.:10]  Out: 1892 [Urine:1600; Drains:170]    Physical Exam:   Vitals and nursing note reviewed. Patient unavailable for re-examination  Constitutional:       Appearance: She is ill-appearing. HENT: unremarkable   Eyes:      Pupils: Pupils are equal, round, and reactive to light. Cardiovascular:      Rate and Rhythm: Regular rhythm. Heart sounds: No murmur heard. Pulmonary: clear bilaterally   Abdominal:      General: Bowel sounds are normal.      Palpations: Abdomen is soft. Tenderness: There is no abdominal tenderness   Genitourinary:     Comments: [place] catheter  Musculoskeletal:      Cervical back: Neck supple. Right lower leg: No edema. Left lower leg: No edema. Skin:     Findings: No rash. Neurological: nonfocal, no confusion  Psychiatric: normal behavior     Lab/Data Review:     WBC 7,500    Procal 0.14 < 0.84 <1.09 <1.16  CRP 4.75 <14.90 <28.60 <27.80 <29.60     Blood culture (7/26) No growth FINAL  Urine culture (7/26) >100,000 cfu/ml E. Coli PAN-sensitive  Body fluid, bile culture (7/27) E.  Coli RAMSEY-sensitive    Assessment:     Active Problems:    Unstable angina (HCC) (7/25/2021)      Chest pain with moderate risk for cardiac etiology (7/25/2021)    1. Sepsis with septic shock, manifested by fever, hypotension requiring vasopressors, leukocytosis, elevated procal and CRP, improving except CRP  2. Complicated UTI (right nephrolithiasis) with pyuria and bacteriuria, secondary to E. coli, Day #7 IV Meropenem  3. ?Acute cholecystitis (LFTs normal), status post cholecystostomy tube placement, culture growing E. coli, Day #7 IV Meropenem  4. Chronic kidney disease  5. Penicillin allergy     Comment:  Patient is now off Seroquel, therefore, Levaquin should be safe. Sepsis is resolving. Plan:   1. Agree with discharge on oral Levaquin 750 mg daily for another 10 days)  2.  Cleared for discharge from ID standpoint       Signed By: Erick Martinez MD     August 2, 2021

## 2021-08-02 NOTE — PROGRESS NOTES
CARDIOLOGY PROGRESS NOTE     Patient seen and examined. This is a patient who is followed for chest pain. Patient has a history of hypertension, coronary artery disease, and atrial fibrillation. No acute events noted overnight. No other complaints reported. Telemetry reviewed, there were no events noted in the past 24 hours. Pertinent review of systems items noted above, all other systems are negative. Current medications reviewed. Physical Examination  Vital signs are stable. Blood pressure 147/59 , Pulse 89  No apparent distress. Heart is regular, rate and rhythm. Normal S1, S2, no murmurs are appreciated. Lungs are clear bilaterally. Abdomen is soft, nontender, normal bowel sounds. Extremities have no edema. Labs reviewed. Labs pending. 7/27/21 - 2- D echo:   LV: Calculated LVEF is 65%. Normal cavity size, wall thickness, systolic function (ejection fraction normal) and diastolic function. Wall motion: normal. Normal left ventricular strain. LA: Dilated left atrium. TV: Mild to moderate tricuspid valve regurgitation is present. Diastolic function if indeterminate given presence of atrial fibrillation. Given age and dilated left atrium, likely has some degree of diastolic dysfunction. Case discussed with Dr. Marnie Tamayo and our impression and recommendations are as follows:    1. Chest pain: No active chest pain. Troponins are negative x 4. Serial EKGs are nonischemic. ACS ruled out. Echocardiogram with preserved EF and no wall motion abnormalities. Continue telemetry monitoring. Isosorbide on hold due to low BP.      2. Atrial fibrillation: Continue Cardizem PO. Her CHADS2-VASc score is 4; will continue coumadin. Continue telemetry monitoring. Keep serum potassium between 4-5 and serum magnesium > 2.     3. Sinus tachycardia: compensatory due to sepsis, cholecystitis. Hold all beta blockers    4. Hypotension: blood pressure appears close to goal. No longer requiring pressor support. 5. Acute cholecystis: surgery on the case. Given tachycardia and negative trops x4, with normal TTE, no further risk stratification needed prior to procedure/surgery. Patient is clear for discharge from a cardiac standpoint. Patient has a follow-up appointment scheduled for 08/04 at 2:00 in [facility]. Please do not hesitate to call me or Dr. Albert Boone.     Melina Su MD, RASHI Peraza  Structural Heart Disease  Endovascular and Vascular Medicine  Interventional Cardiology  [facility] Cardiology  [phone]

## 2021-08-02 NOTE — ROUTINE PROCESS
Report called to [facility].

## 2021-08-02 NOTE — PROGRESS NOTES
Patient is clear to d/c to [facility] for SNF, patient will go to room 307, nurse report can be called to [phone]. CM met with patient's daughter to notify of d/c today, Medicare pt has received, reviewed, and signed 2nd IM letter informing them of their right to appeal the discharge. Signed copied has been placed on pt bedside chart. Nurse notified.

## 2021-08-02 NOTE — DISCHARGE INSTRUCTIONS
Hospitalist Discharge Summary      Patient ID:  Caroline Manuel  [id number]  80 y.o.  2/16/1933 7/25/2021     PCP on record: Haley Cervantes NP     Admit date: 7/25/2021  Discharge date and time: 8/2/2021     DISCHARGE DIAGNOSIS:     Septic shock secondary to cholecystitis/urinary tract infection/acute decompensated heart failure preserved ejection fraction/hypomagnesemia/hypokalemia/acute kidney injury/atrial fibrillation with RVR/hypertension     CONSULTATIONS:  IP CONSULT TO CARDIOLOGY  IP CONSULT TO OB GYN  IP CONSULT TO GASTROENTEROLOGY  IP CONSULT TO INFECTIOUS DISEASES  IP CONSULT TO NEPHROLOGY  IP CONSULT TO GENERAL SURGERY     Excerpted HPI from H&P of Lisset Berrios MD:  Mo Bill a 80 y. o. female with a  past medical history of HTN, CAD, and atrial fibrillation, who presented to the ED with sharp, substernal chest pain and associated nausea and found to be in atrial fibrillation with RVR. Nadyarelis [place] Patient additionally reported experiencing vaginal bleeding while on Eliquis and now on warfarin, she has a gynecology appointment scheduled for evaluation. Given ASA, pain medication and nitroglycerin with relief.  Patient has leukocytosis of unclear etiology with an increasing white blood cell count since admission.  Tenderness in the right upper quadrant as well as left upper quadrant.  CT scan of the abdomen pelvis pending.  Right upper quadrant ultrasound pending.  Prophylactically will treat with Rocephin.  Urine culture and blood culture pending.  INR subtherapeutic at 1.4, BNP 4475. x4 Troponins negative. EKG showed afib. CXR showed central pulmonary vascular prominence and coarse interstitial opacities. Chest pain resolved. Cardiology consulted.  Echo revealed LVEF 65%, dilated left atrium, overnight patient became increasingly hypotensive, was transferred to the ICU, central line placed, obtained CT abdomen pelvis showing biliary sludge, urinalysis consistent with urinary tract infection, patient now s/p cholecystostomy tube, doing well on Meropenem, atrial fibrillation with RVR has resolved, patient had an episode of agitation overnight, likely secondary to sundowning versus ICU delirium, up for stepdown     ______________________________________________________________________  DISCHARGE SUMMARY/HOSPITAL COURSE:  for full details see H&P, daily progress notes, labs, consult notes.      Patient was subsequently admitted to [address] [place] [address] for evaluation as well as management, patient was found to have septic shock secondary to cholecystitis, patient was evaluated by cardiology as well as general surgery as well as infectious ease, cholecystostomy tube was placed, patient was on pressors, patient was already on IV antibiotics, patient did well in postoperative period, during. Of septic shock patient was in atrial fibrillation with RVR started on amiodarone drip, subsequently resolved with resolution of sepsis, patient was started on oral Cardizem as per cardiology recommendations, patient was evaluated by physical therapy as well as Occupational Therapy as patient's clinical status overall improved patient was deemed stable for discharge and close outpatient follow-up with primary care physician/general surgery/nephrology/cardiology, the plan was explained to the patient and patient's daughter in detail who are agreeable to current plan.           _______________________________________________________________________  Patient seen and examined by me on discharge day.   Pertinent Findings:  Gen:    Not in distress  Chest:  Clear lungs  CVS:    Regular rhythm, s1/s2 no m/r/g  No edema  Abd:     Soft, not distended, not tender  Neuro:  Alert, Oriented x 4  _______________________________________________________________________  DISCHARGE MEDICATIONS:         Current Discharge Medication List             START taking these medications     Details   acetaminophen (TYLENOL) 325 mg tablet Take 2 Tablets by mouth every six (6) hours as needed for Pain or Fever. Qty: 60 Tablet, Refills: 0  Start date: 7/31/2021       dilTIAZem IR (CARDIZEM) 30 mg tablet Take 1 Tablet by mouth every six (6) hours. Qty: 120 Tablet, Refills: 0  Start date: 7/31/2021       levoFLOXacin (Levaquin) 750 mg tablet Take 1 Tablet by mouth daily. Qty: 10 Tablet, Refills: 0  Start date: 7/31/2021                 CONTINUE these medications which have NOT CHANGED     Details   warfarin (Coumadin) 5 mg tablet Take 5 mg by mouth daily.       atorvastatin (LIPITOR) 20 mg tablet Take 20 mg by mouth daily.       niacin (NIASPAN) 1,000 mg Tb24 tab Take 1,000 mg by mouth nightly.       minoxidiL (LONITEN) 10 mg tablet Take 5 mg by mouth daily.       isosorbide mononitrate ER (IMDUR) 30 mg tablet Take 30 mg by mouth daily.       cholecalciferol, vitamin D3, (Vitamin D3) 50 mcg (2,000 unit) tab Take 2,000 Units by mouth.       Calcium-Cholecalciferol, D3, 600 mg(1,500mg) -400 unit chew Take 1,500 mg by mouth.       levothyroxine (SYNTHROID) 75 mcg tablet Take 75 mcg by mouth Daily (before breakfast).              STOP taking these medications         lisinopriL (PRINIVIL, ZESTRIL) 20 mg tablet Comments:   Reason for Stopping:            metoprolol succinate (TOPROL-XL) 50 mg XL tablet Comments:   Reason for Stopping:            hydroCHLOROthiazide (HYDRODIURIL) 25 mg tablet Comments:   Reason for Stopping:                       Patient Follow Up Instructions: Activity: PT/OT Eval and Treat  Diet: Dysphagia diet-pureed  Wound Care: As directed     Follow-up with PCP/Cardiology/General Surgery in 2 weeks.   Follow-up tests/labs As per above  physicians           Follow-up Information      Follow up With Specialties Details Why Contact Info     Maegan Deal NP Nurse Practitioner In 1 week   [address]  [address] [place] [address]  Jaun Gaspar MD Cardiology In 1 week   [address] [facility]        Sonal Donald MD Colon and Rectal Surgery, General Surgery In 1 week   [address]  [phone]        Vasiliy Howard MD Nephrology, Nephrology In 1 week   [address] [id number] [phone]             ________________________________________________________________     Risk of deterioration: Low     Condition at Discharge:  Stable  __________________________________________________________________     Disposition  SNF/LTC     ____________________________________________________________________     Code Status: Full Code  ___________________________________________________________________        Total time in minutes spent coordinating this discharge (includes going over instructions, follow-up, prescriptions, and preparing report for sign off to her PCP) :  45 minutes     Signed:   Andressa Ramon MD

## 2021-08-11 ENCOUNTER — OFFICE VISIT (OUTPATIENT)
Dept: SURGERY | Age: 86
End: 2021-08-11
Payer: MEDICARE

## 2021-08-11 VITALS
BODY MASS INDEX: 32.71 KG/M2 | HEART RATE: 85 BPM | WEIGHT: 191.6 LBS | DIASTOLIC BLOOD PRESSURE: 41 MMHG | SYSTOLIC BLOOD PRESSURE: 105 MMHG | HEIGHT: 64 IN | TEMPERATURE: 98.2 F | OXYGEN SATURATION: 95 % | RESPIRATION RATE: 20 BRPM

## 2021-08-11 DIAGNOSIS — K81.9 CHOLECYSTITIS: Primary | ICD-10-CM

## 2021-08-11 PROCEDURE — 99213 OFFICE O/P EST LOW 20 MIN: CPT | Performed by: COLON & RECTAL SURGERY

## 2021-08-11 PROCEDURE — 1111F DSCHRG MED/CURRENT MED MERGE: CPT | Performed by: COLON & RECTAL SURGERY

## 2021-08-11 PROCEDURE — G8536 NO DOC ELDER MAL SCRN: HCPCS | Performed by: COLON & RECTAL SURGERY

## 2021-08-11 PROCEDURE — G8427 DOCREV CUR MEDS BY ELIG CLIN: HCPCS | Performed by: COLON & RECTAL SURGERY

## 2021-08-11 PROCEDURE — 1090F PRES/ABSN URINE INCON ASSESS: CPT | Performed by: COLON & RECTAL SURGERY

## 2021-08-11 PROCEDURE — 1101F PT FALLS ASSESS-DOCD LE1/YR: CPT | Performed by: COLON & RECTAL SURGERY

## 2021-08-11 PROCEDURE — G8432 DEP SCR NOT DOC, RNG: HCPCS | Performed by: COLON & RECTAL SURGERY

## 2021-08-11 PROCEDURE — G8417 CALC BMI ABV UP PARAM F/U: HCPCS | Performed by: COLON & RECTAL SURGERY

## 2021-08-11 RX ORDER — ONDANSETRON 4 MG/1
4 TABLET, ORALLY DISINTEGRATING ORAL
Qty: 24 TABLET | Refills: 1 | Status: SHIPPED | OUTPATIENT
Start: 2021-08-11

## 2021-08-11 NOTE — PROGRESS NOTES
Chief Complaint   Patient presents with    New Patient     f/u from hospital - cholecystostomy tube placed     Visit Vitals  BP (!) 105/41 (BP 1 Location: Left arm, BP Patient Position: Sitting, BP Cuff Size: Adult)   Pulse 85   Temp 98.2 °F (36.8 °C) (Temporal)   Resp 20   Ht 5' 4\" (1.626 m)   Wt 191 lb 9.6 oz (86.9 kg)   SpO2 95%   BMI 32.89 kg/m²

## 2021-08-30 PROBLEM — K81.9 CHOLECYSTITIS: Status: ACTIVE | Noted: 2021-08-30

## 2021-08-30 NOTE — PROGRESS NOTES
OFFICE VISIT NOTE    Obdulio Morrissey is a 80 y.o. female who presents to the office today for:    Chief Complaint   Patient presents with    New Patient     f/u from hospital - cholecystostomy tube placed       Patient is an 80-year-old female who went into septic shock from cholecystitis in the hospital.  She was recently admitted to [facility] has been worked up for possible sepsis and pneumonia when she became hypotensive requiring transfer to the ICU. She had further work-up which revealed cholecystitis. She had a cholecystostomy tube placed. Her final microbiology was E. coli. She was treated with antibiotics and cholecystostomy tube and improved. She comes in today for follow-up with a cholecystostomy tube. She continues have drainage in the tube. She denies any pain at the site. Tolerating a diet with no nausea or vomiting.       Past Medical History:   Diagnosis Date    Atrial fibrillation ([address]     CAD (coronary artery disease)     Diabetes mellitus ([address]     resolved    [facility]ometrial [facility] [address]     s/p TAHBSO, RT in 1990s    Hypertension     Skin cancer        Past Surgical History:   Procedure Laterality Date    HX COLONOSCOPY  2011    HX HYSTERECTOMY      IR CHOLECYSTOSTOMY PERCUTANEOUS  7/27/2021       Family History   Problem Relation Age of Onset    Hypertension Father     Cancer Father     Hypertension Brother     Ovarian Cancer Other         daughter, unsure if genetic testing performed       Social History     Socioeconomic History    Marital status:      Spouse name: Not on file    Number of children: Not on file    Years of education: Not on file    Highest education level: Not on file   Occupational History    Not on file   Tobacco Use    Smoking status: Former Smoker    Smokeless tobacco: Never Used    Tobacco comment: quit >50 yrs ago   Vaping Use    Vaping Use: Never used   Substance and Sexual Activity    Alcohol use: Not on file    Drug use: Never    Sexual activity: Not on file   Other Topics Concern    Not on file   Social History Narrative    Lives alone. Social Determinants of Health     Financial Resource Strain:     Difficulty of Paying Living Expenses:    Food Insecurity:     Worried About Running Out of Food in the Last Year:     [address] in the Last Year:    Transportation Needs:     Lack of Transportation (Medical):  Lack of Transportation (Non-Medical):    Physical Activity:     Days of Exercise per Week:     Minutes of Exercise per Session:    Stress:     Feeling of Stress :    Social Connections:     Frequency of Communication with Friends and Family:     Frequency of Social Gatherings with Friends and Family:     Attends Synagogue Services:     Active Member of Clubs or Organizations:     Attends Club or Organization Meetings:     Marital Status:    Intimate Partner Violence:     Fear of Current or Ex-Partner:     Emotionally Abused:     Physically Abused:     Sexually Abused: Allergies   Allergen Reactions    Penicillins Unknown (comments)       Current Outpatient Medications   Medication Sig    ondansetron (ZOFRAN ODT) 4 mg disintegrating tablet Take 1 Tablet by mouth every eight (8) hours as needed for Nausea or Vomiting.  acetaminophen (TYLENOL) 325 mg tablet Take 2 Tablets by mouth every six (6) hours as needed for Pain or Fever.  dilTIAZem IR (CARDIZEM) 30 mg tablet Take 1 Tablet by mouth every six (6) hours.  levoFLOXacin (Levaquin) 750 mg tablet Take 1 Tablet by mouth daily.  niacin (NIASPAN) 1,000 mg Tb24 tab Take 1,000 mg by mouth nightly.  warfarin (Coumadin) 5 mg tablet Take 5 mg by mouth daily.  atorvastatin (LIPITOR) 20 mg tablet Take 20 mg by mouth daily.  minoxidiL (LONITEN) 10 mg tablet Take 5 mg by mouth daily.     isosorbide mononitrate ER (IMDUR) 30 mg tablet Take 30 mg by mouth daily.  cholecalciferol, vitamin D3, (Vitamin D3) 50 mcg (2,000 unit) tab Take 2,000 Units by mouth.  Calcium-Cholecalciferol, D3, 600 mg(1,500mg) -400 unit chew Take 1,500 mg by mouth.  levothyroxine (SYNTHROID) 75 mcg tablet Take 75 mcg by mouth Daily (before breakfast). No current facility-administered medications for this visit. Review of Systems   All other systems reviewed and are negative. BP (!) 105/41 (BP 1 Location: Left arm, BP Patient Position: Sitting, BP Cuff Size: Adult)   Pulse 85   Temp 98.2 °F (36.8 °C) (Temporal)   Resp 20   Ht 5' 4\" (1.626 m)   Wt 191 lb 9.6 oz (86.9 kg)   SpO2 95%   BMI 32.89 kg/m²     Physical Exam  Abdominal:      Comments: Abdomen is soft, nondistended, nontender. Cholecystostomy tube is in place and draining. Problem List Items Addressed This Visit        Digestive    Cholecystitis - Primary    Relevant Medications    ondansetron (ZOFRAN ODT) 4 mg disintegrating tablet          Assessment and Plan:  Splane to the patient and her family member would have to wait at least 6 weeks following placement of the cholecystostomy tube to remove it. Given her age at this point I recommend against interval cholecystectomy.   She will come back and see me in a few weeks for tube removal.            Safia Rubin MD

## 2021-09-08 ENCOUNTER — OFFICE VISIT (OUTPATIENT)
Dept: SURGERY | Age: 86
End: 2021-09-08
Payer: MEDICARE

## 2021-09-08 VITALS
OXYGEN SATURATION: 97 % | TEMPERATURE: 98.4 F | BODY MASS INDEX: 29.53 KG/M2 | HEART RATE: 76 BPM | DIASTOLIC BLOOD PRESSURE: 63 MMHG | RESPIRATION RATE: 18 BRPM | WEIGHT: 173 LBS | SYSTOLIC BLOOD PRESSURE: 121 MMHG | HEIGHT: 64 IN

## 2021-09-08 DIAGNOSIS — K81.9 CHOLECYSTITIS: Primary | ICD-10-CM

## 2021-09-08 PROCEDURE — G8427 DOCREV CUR MEDS BY ELIG CLIN: HCPCS | Performed by: COLON & RECTAL SURGERY

## 2021-09-08 PROCEDURE — G8510 SCR DEP NEG, NO PLAN REQD: HCPCS | Performed by: COLON & RECTAL SURGERY

## 2021-09-08 PROCEDURE — G8536 NO DOC ELDER MAL SCRN: HCPCS | Performed by: COLON & RECTAL SURGERY

## 2021-09-08 PROCEDURE — 1090F PRES/ABSN URINE INCON ASSESS: CPT | Performed by: COLON & RECTAL SURGERY

## 2021-09-08 PROCEDURE — 99213 OFFICE O/P EST LOW 20 MIN: CPT | Performed by: COLON & RECTAL SURGERY

## 2021-09-08 PROCEDURE — G8417 CALC BMI ABV UP PARAM F/U: HCPCS | Performed by: COLON & RECTAL SURGERY

## 2021-09-08 PROCEDURE — 1101F PT FALLS ASSESS-DOCD LE1/YR: CPT | Performed by: COLON & RECTAL SURGERY

## 2021-09-08 NOTE — PROGRESS NOTES
Chief Complaint   Patient presents with    Follow-up     Drainage tube related to cholecystitis. Patient has a drainage log      1. Have you been to the ER, urgent care clinic since your last visit? Hospitalized since your last visit? No    2. Have you seen or consulted any other health care providers outside of the [address] [place] [address] since your last visit? Include any pap smears or colon screening.  No   Cardiologist - [facility]     Visit Vitals  /63 (BP 1 Location: Left arm, BP Patient Position: Sitting, BP Cuff Size: Adult)   Pulse 76   Temp 98.4 °F (36.9 °C) (Temporal)   Resp 18   Ht 5' 4\" (1.626 m)   Wt 173 lb (78.5 kg)   SpO2 97%   BMI 29.70 kg/m²

## 2021-09-21 NOTE — PROGRESS NOTES
OFFICE VISIT NOTE    Bharathi Jack is a 80 y.o. female who presents to the office today for:    Chief Complaint   Patient presents with    Follow-up     Drainage tube related to cholecystitis. Patient has a drainage log        The patient comes in today for follow-up status post placement of cholecystostomy tube by interventional radiology on July 27, 2021. She had been admitted to the hospital and subsequently developed sepsis secondary to cholecystitis. Given all of her comorbidities decision was made to proceed with a cholecystostomy tube rather than surgery in the emergency setting. She has done well following her cholecystostomy tube placement. She was discharged from the hospital on October 2, 2021. She has done well since her discharge from the hospital.  This is her second post discharge visit. She denies any abdominal pain and is tolerating a regular diet with no issues. She does continue to have fairly high outputs from her cholecystostomy tube.           Past Medical History:   Diagnosis Date    Atrial fibrillation ([address]     CAD (coronary artery disease)     Diabetes mellitus ([address]     resolved    Endometrial cancer ([facility]     s/p JABARI, RT in 1990s    Hypertension     Skin cancer        Past Surgical History:   Procedure Laterality Date    HX COLONOSCOPY  2011    HX HYSTERECTOMY      IR CHOLECYSTOSTOMY PERCUTANEOUS  7/27/2021       Family History   Problem Relation Age of Onset    Hypertension Father     Cancer Father     Hypertension Brother     Ovarian Cancer Other         daughter, unsure if genetic testing performed       Social History     Socioeconomic History    Marital status:      Spouse name: Not on file    Number of children: Not on file    Years of education: Not on file    Highest education level: Not on file   Occupational History    Not on file   Tobacco Use    Smoking status: Former Smoker    Smokeless tobacco: Never Used    Tobacco comment: quit >50 yrs ago   Vaping Use    Vaping Use: Never used   Substance and Sexual Activity    Alcohol use: Not Currently    Drug use: Never    Sexual activity: Not on file   Other Topics Concern    Not on file   Social History Narrative    Lives alone. Social Determinants of Health     Financial Resource Strain:     Difficulty of Paying Living Expenses:    Food Insecurity:     Worried About Running Out of Food in the Last Year:     [address] in the Last Year:    Transportation Needs:     Lack of Transportation (Medical):  Lack of Transportation (Non-Medical):    Physical Activity:     Days of Exercise per Week:     Minutes of Exercise per Session:    Stress:     Feeling of Stress :    Social Connections:     Frequency of Communication with Friends and Family:     Frequency of Social Gatherings with Friends and Family:     Attends Christianity Services:     Active Member of Clubs or Organizations:     Attends Club or Organization Meetings:     Marital Status:    Intimate Partner Violence:     Fear of Current or Ex-Partner:     Emotionally Abused:     Physically Abused:     Sexually Abused: Allergies   Allergen Reactions    Penicillins Unknown (comments)       Current Outpatient Medications   Medication Sig    acetaminophen (TYLENOL) 325 mg tablet Take 2 Tablets by mouth every six (6) hours as needed for Pain or Fever.  dilTIAZem IR (CARDIZEM) 30 mg tablet Take 1 Tablet by mouth every six (6) hours.  niacin (NIASPAN) 1,000 mg Tb24 tab Take 1,000 mg by mouth nightly.  warfarin (Coumadin) 5 mg tablet Take 5 mg by mouth daily.  atorvastatin (LIPITOR) 20 mg tablet Take 20 mg by mouth daily.  isosorbide mononitrate ER (IMDUR) 30 mg tablet Take 30 mg by mouth daily.  cholecalciferol, vitamin D3, (Vitamin D3) 50 mcg (2,000 unit) tab Take 2,000 Units by mouth.     levothyroxine (SYNTHROID) 75 mcg tablet Take 75 mcg by mouth Daily (before breakfast).  ondansetron (ZOFRAN ODT) 4 mg disintegrating tablet Take 1 Tablet by mouth every eight (8) hours as needed for Nausea or Vomiting. (Patient not taking: Reported on 9/8/2021)    minoxidiL (LONITEN) 10 mg tablet Take 5 mg by mouth daily. (Patient not taking: Reported on 9/8/2021)     No current facility-administered medications for this visit. Review of Systems   All other systems reviewed and are negative. /63 (BP 1 Location: Left arm, BP Patient Position: Sitting, BP Cuff Size: Adult)   Pulse 76   Temp 98.4 °F (36.9 °C) (Temporal)   Resp 18   Ht 5' 4\" (1.626 m)   Wt 173 lb (78.5 kg)   SpO2 97%   BMI 29.70 kg/m²     Physical Exam  Abdominal:      Comments: On examination abdomen is soft, nontender, nondistended. Her cholecystostomy tube is in place in the right upper quadrant. It is draining bile. Problem List Items Addressed This Visit        Digestive    Cholecystitis - Primary          Assessment and Plan:  I told the patient that given the fact that she is draining a fair amount from the cholecystostomy tube I like to wait an additional month to make sure that the tract is completely healed before removing her cholecystostomy tube. My suspicion is that her cystic duct compromise has resolved which is why she is draining as much as she is. I told her once the tube is out the tract was slowly closed however there might be a possibility that she require surgery with cholecystectomy. She will come back and see me in 1 month.             Jonh Wallace MD

## 2021-10-12 ENCOUNTER — OFFICE VISIT (OUTPATIENT)
Dept: SURGERY | Age: 86
End: 2021-10-12
Payer: MEDICARE

## 2021-10-12 VITALS
DIASTOLIC BLOOD PRESSURE: 61 MMHG | HEIGHT: 64 IN | OXYGEN SATURATION: 97 % | WEIGHT: 166.2 LBS | RESPIRATION RATE: 16 BRPM | HEART RATE: 79 BPM | TEMPERATURE: 97.6 F | SYSTOLIC BLOOD PRESSURE: 112 MMHG | BODY MASS INDEX: 28.38 KG/M2

## 2021-10-12 DIAGNOSIS — K81.9 CHOLECYSTITIS: Primary | ICD-10-CM

## 2021-10-12 PROCEDURE — 1101F PT FALLS ASSESS-DOCD LE1/YR: CPT | Performed by: COLON & RECTAL SURGERY

## 2021-10-12 PROCEDURE — G8427 DOCREV CUR MEDS BY ELIG CLIN: HCPCS | Performed by: COLON & RECTAL SURGERY

## 2021-10-12 PROCEDURE — G8432 DEP SCR NOT DOC, RNG: HCPCS | Performed by: COLON & RECTAL SURGERY

## 2021-10-12 PROCEDURE — G8536 NO DOC ELDER MAL SCRN: HCPCS | Performed by: COLON & RECTAL SURGERY

## 2021-10-12 PROCEDURE — 1090F PRES/ABSN URINE INCON ASSESS: CPT | Performed by: COLON & RECTAL SURGERY

## 2021-10-12 PROCEDURE — 99213 OFFICE O/P EST LOW 20 MIN: CPT | Performed by: COLON & RECTAL SURGERY

## 2021-10-12 PROCEDURE — G8417 CALC BMI ABV UP PARAM F/U: HCPCS | Performed by: COLON & RECTAL SURGERY

## 2021-10-12 RX ORDER — ASCORBIC ACID 500 MG
500 TABLET ORAL DAILY
COMMUNITY

## 2021-10-12 RX ORDER — HYDROCHLOROTHIAZIDE 25 MG/1
25 TABLET ORAL DAILY
COMMUNITY

## 2021-10-12 RX ORDER — MIRTAZAPINE 15 MG/1
15 TABLET, FILM COATED ORAL
COMMUNITY

## 2021-10-12 RX ORDER — BISMUTH SUBSALICYLATE 262 MG
1 TABLET,CHEWABLE ORAL DAILY
COMMUNITY

## 2021-10-12 RX ORDER — CALCIUM CARBONATE 600 MG
600 TABLET ORAL 2 TIMES DAILY
COMMUNITY

## 2021-10-12 NOTE — PROGRESS NOTES
Chief Complaint   Patient presents with    Follow-up     Drainage Tube      Visit Vitals  /61 (BP 1 Location: Left arm, BP Patient Position: Sitting)   Pulse 79   Temp 97.6 °F (36.4 °C) (Temporal)   Resp 16   Ht 5' 4\" (1.626 m)   Wt 166 lb 3.2 oz (75.4 kg)   SpO2 97%   BMI 28.53 kg/m²     1. Have you been to the ER, urgent care clinic since your last visit? Hospitalized since your last visit? No    2. Have you seen or consulted any other health care providers outside of the [address] [place] [address] since your last visit? Include any pap smears or colon screening.  Yes, Abdirashid Wolf Cardiology and PCP

## 2021-10-21 NOTE — PROGRESS NOTES
OFFICE VISIT NOTE    Marlen Trammell is a 80 y.o. female who presents to the office today for:    Chief Complaint   Patient presents with    Follow-up     Drainage Tube        Patient comes in today for follow-up status post cholecystostomy tube placement in July 2021 for acute cholecystitis resulting in sepsis. I last saw her approximately a month ago. Today she states that her tube output has decreased although it is still present. She denies any pain. She denies any nausea or vomiting. She is tolerating a diet. Past Medical History:   Diagnosis Date    Atrial fibrillation ([address]     CAD (coronary artery disease)     Diabetes mellitus ([facility] [address]     resolved    Endometrial cancer ([facility] [address]     s/p TAHBSO, RT in 1990s    Hypertension     Skin cancer        Past Surgical History:   Procedure Laterality Date    HX COLONOSCOPY  2011    HX HYSTERECTOMY      IR CHOLECYSTOSTOMY PERCUTANEOUS  7/27/2021       Family History   Problem Relation Age of Onset    Hypertension Father     Cancer Father     Hypertension Brother     Ovarian Cancer Other         daughter, unsure if genetic testing performed       Social History     Socioeconomic History    Marital status:      Spouse name: Not on file    Number of children: Not on file    Years of education: Not on file    Highest education level: Not on file   Occupational History    Not on file   Tobacco Use    Smoking status: Former Smoker    Smokeless tobacco: Never Used    Tobacco comment: quit >50 yrs ago   Vaping Use    Vaping Use: Never used   Substance and Sexual Activity    Alcohol use: Not Currently    Drug use: Never    Sexual activity: Not on file   Other Topics Concern    Not on file   Social History Narrative    Lives alone.      Social Determinants of Health     Financial Resource Strain:     Difficulty of Paying Living Expenses:    Food Insecurity:     Worried About [address] [facility] in the Last Year:     [address] [address] [facility] [address] in the Last Year:    Transportation Needs:     Lack of Transportation (Medical):  Lack of Transportation (Non-Medical):    Physical Activity:     Days of Exercise per Week:     Minutes of Exercise per Session:    Stress:     Feeling of Stress :    Social Connections:     Frequency of Communication with Friends and Family:     Frequency of Social Gatherings with Friends and Family:     Attends Hindu Services:     Active Member of Clubs or Organizations:     Attends Club or Organization Meetings:     Marital Status:    Intimate Partner Violence:     Fear of Current or Ex-Partner:     Emotionally Abused:     Physically Abused:     Sexually Abused: Allergies   Allergen Reactions    Penicillins Swelling       Current Outpatient Medications   Medication Sig    multivitamin (ONE A DAY) tablet Take 1 Tablet by mouth daily.  calcium carbonate (CALTREX) 600 mg calcium (1,500 mg) tablet Take 600 mg by mouth two (2) times a day.  mirtazapine (REMERON) 15 mg tablet Take 15 mg by mouth nightly.  hydroCHLOROthiazide (HYDRODIURIL) 25 mg tablet Take 25 mg by mouth daily.  ascorbic acid, vitamin C, (Vitamin C) 500 mg tablet Take 500 mg by mouth daily.  acetaminophen (TYLENOL) 325 mg tablet Take 2 Tablets by mouth every six (6) hours as needed for Pain or Fever.  dilTIAZem IR (CARDIZEM) 30 mg tablet Take 1 Tablet by mouth every six (6) hours. (Patient taking differently: Take 120 mg by mouth daily.)    niacin (NIASPAN) 1,000 mg Tb24 tab Take 1,000 mg by mouth nightly.  warfarin (Coumadin) 5 mg tablet Take 2.5 mg by mouth daily.  atorvastatin (LIPITOR) 20 mg tablet Take 20 mg by mouth daily.  cholecalciferol, vitamin D3, (Vitamin D3) 50 mcg (2,000 unit) tab Take 2,000 Units by mouth.     ondansetron (ZOFRAN ODT) 4 mg disintegrating tablet Take 1 Tablet by mouth every eight (8) hours as needed for Nausea or Vomiting. (Patient not taking: Reported on 9/8/2021)    minoxidiL (LONITEN) 10 mg tablet Take 5 mg by mouth daily. (Patient not taking: Reported on 9/8/2021)    isosorbide mononitrate ER (IMDUR) 30 mg tablet Take 30 mg by mouth daily. (Patient not taking: Reported on 10/12/2021)    levothyroxine (SYNTHROID) 75 mcg tablet Take 75 mcg by mouth Daily (before breakfast). (Patient not taking: Reported on 10/12/2021)     No current facility-administered medications for this visit. Review of Systems   All other systems reviewed and are negative. /61 (BP 1 Location: Left arm, BP Patient Position: Sitting)   Pulse 79   Temp 97.6 °F (36.4 °C) (Temporal)   Resp 16   Ht 5' 4\" (1.626 m)   Wt 166 lb 3.2 oz (75.4 kg)   SpO2 97%   BMI 28.53 kg/m²     Physical Exam  Abdominal:      Comments: On examination abdomen soft, nontender, nondistended. Cholecystostomy tube is in position and draining bile. Problem List Items Addressed This Visit        Digestive    Cholecystitis - Primary          Assessment and Plan:    I told Zoila that I like to the tube and see how she does with it being the next couple weeks. If she does well then we can DC the tube at her next appointment.         Selma Bautista MD

## 2021-10-27 ENCOUNTER — OFFICE VISIT (OUTPATIENT)
Dept: SURGERY | Age: 86
End: 2021-10-27
Payer: MEDICARE

## 2021-10-27 VITALS
DIASTOLIC BLOOD PRESSURE: 75 MMHG | HEIGHT: 64 IN | HEART RATE: 70 BPM | BODY MASS INDEX: 28.61 KG/M2 | RESPIRATION RATE: 16 BRPM | WEIGHT: 167.6 LBS | OXYGEN SATURATION: 98 % | SYSTOLIC BLOOD PRESSURE: 129 MMHG | TEMPERATURE: 97.5 F

## 2021-10-27 DIAGNOSIS — K81.9 CHOLECYSTITIS: Primary | ICD-10-CM

## 2021-10-27 PROCEDURE — G8432 DEP SCR NOT DOC, RNG: HCPCS | Performed by: COLON & RECTAL SURGERY

## 2021-10-27 PROCEDURE — G8417 CALC BMI ABV UP PARAM F/U: HCPCS | Performed by: COLON & RECTAL SURGERY

## 2021-10-27 PROCEDURE — G8536 NO DOC ELDER MAL SCRN: HCPCS | Performed by: COLON & RECTAL SURGERY

## 2021-10-27 PROCEDURE — 1101F PT FALLS ASSESS-DOCD LE1/YR: CPT | Performed by: COLON & RECTAL SURGERY

## 2021-10-27 PROCEDURE — 99213 OFFICE O/P EST LOW 20 MIN: CPT | Performed by: COLON & RECTAL SURGERY

## 2021-10-27 PROCEDURE — G8427 DOCREV CUR MEDS BY ELIG CLIN: HCPCS | Performed by: COLON & RECTAL SURGERY

## 2021-10-27 PROCEDURE — 1090F PRES/ABSN URINE INCON ASSESS: CPT | Performed by: COLON & RECTAL SURGERY

## 2021-10-27 NOTE — PROGRESS NOTES
OFFICE VISIT NOTE    Darrell Cantu is a 80 y.o. female who presents to the office today for:    Chief Complaint   Patient presents with    Follow-up     Drainage Latosha Paniagua comes in today for follow-up status post placement of a cholecystostomy tube by interventional radiology July 2021 for acute cholecystitis. At that time she was septic and hypotensive on pressors. Her tube has now been clamped for about 2 weeks. She has been doing fine. She denies any abdominal pain. She has been tolerating a diet. She otherwise has no complaints her health is otherwise unchanged. Past Medical History:   Diagnosis Date    Atrial fibrillation ([address]     CAD (coronary artery disease)     Diabetes mellitus ([address]     resolved    Endometrial [facility] ([facility] [address]     s/p TAHBSO, RT in 1990s    Hypertension     Skin cancer        Past Surgical History:   Procedure Laterality Date    HX COLONOSCOPY  2011    HX HYSTERECTOMY      IR CHOLECYSTOSTOMY PERCUTANEOUS  7/27/2021       Family History   Problem Relation Age of Onset    Hypertension Father     Cancer Father     Hypertension Brother     Ovarian Cancer Other         daughter, unsure if genetic testing performed       Social History     Socioeconomic History    Marital status:      Spouse name: Not on file    Number of children: Not on file    Years of education: Not on file    Highest education level: Not on file   Occupational History    Not on file   Tobacco Use    Smoking status: Former Smoker    Smokeless tobacco: Never Used    Tobacco comment: quit >50 yrs ago   Vaping Use    Vaping Use: Never used   Substance and Sexual Activity    Alcohol use: Not Currently    Drug use: Never    Sexual activity: Not on file   Other Topics Concern    Not on file   Social History Narrative    Lives alone.      Social Determinants of Health     Financial Resource Strain:     Difficulty of Paying Living Expenses:    Food Insecurity:     Worried About Running Out of Food in the Last Year:     [address] in the Last Year:    Transportation Needs:     Lack of Transportation (Medical):  Lack of Transportation (Non-Medical):    Physical Activity:     Days of Exercise per Week:     Minutes of Exercise per Session:    Stress:     Feeling of Stress :    Social Connections:     Frequency of Communication with Friends and Family:     Frequency of Social Gatherings with Friends and Family:     Attends Christianity Services:     Active Member of Clubs or Organizations:     Attends Club or Organization Meetings:     Marital Status:    Intimate Partner Violence:     Fear of Current or Ex-Partner:     Emotionally Abused:     Physically Abused:     Sexually Abused: Allergies   Allergen Reactions    Penicillins Swelling       Current Outpatient Medications   Medication Sig    multivitamin (ONE A DAY) tablet Take 1 Tablet by mouth daily.  calcium carbonate (CALTREX) 600 mg calcium (1,500 mg) tablet Take 600 mg by mouth two (2) times a day.  mirtazapine (REMERON) 15 mg tablet Take 15 mg by mouth nightly.  hydroCHLOROthiazide (HYDRODIURIL) 25 mg tablet Take 25 mg by mouth daily.  ascorbic acid, vitamin C, (Vitamin C) 500 mg tablet Take 500 mg by mouth daily.  ondansetron (ZOFRAN ODT) 4 mg disintegrating tablet Take 1 Tablet by mouth every eight (8) hours as needed for Nausea or Vomiting.  acetaminophen (TYLENOL) 325 mg tablet Take 2 Tablets by mouth every six (6) hours as needed for Pain or Fever.  dilTIAZem IR (CARDIZEM) 30 mg tablet Take 1 Tablet by mouth every six (6) hours. (Patient taking differently: Take 120 mg by mouth daily.)    niacin (NIASPAN) 1,000 mg Tb24 tab Take 1,000 mg by mouth nightly.  warfarin (Coumadin) 5 mg tablet Take 2.5 mg by mouth daily.     atorvastatin (LIPITOR) 20 mg tablet Take 20 mg by mouth daily.  minoxidiL (LONITEN) 10 mg tablet Take 5 mg by mouth daily.  isosorbide mononitrate ER (IMDUR) 30 mg tablet Take 30 mg by mouth daily.  cholecalciferol, vitamin D3, (Vitamin D3) 50 mcg (2,000 unit) tab Take 2,000 Units by mouth.  levothyroxine (SYNTHROID) 75 mcg tablet Take 75 mcg by mouth Daily (before breakfast). No current facility-administered medications for this visit. Review of Systems   All other systems reviewed and are negative. /75 (BP 1 Location: Left arm, BP Patient Position: Sitting)   Pulse 70   Temp 97.5 °F (36.4 °C) (Temporal)   Resp 16   Ht 5' 4\" (1.626 m)   Wt 167 lb 9.6 oz (76 kg)   SpO2 98%   BMI 28.77 kg/m²     Physical Exam  Abdominal:      Comments: On examination abdomen is soft, nontender, nondistended. Cholecystostomy tube is in place in the right upper quadrant. This was successfully removed in the office. Problem List Items Addressed This Visit        Digestive    Cholecystitis - Primary          Assessment and Plan:    I told Zoila to cover the site with a dry dressing as needed as it may leak for a few days. If she develops any abdominal pain nausea vomiting she is to call my office. I will see her back in 2 weeks.           Thi Roche MD

## 2021-10-27 NOTE — PROGRESS NOTES
Chief Complaint   Patient presents with    Follow-up     Drainage Tube      Visit Vitals  /75 (BP 1 Location: Left arm, BP Patient Position: Sitting)   Pulse 70   Temp 97.5 °F (36.4 °C) (Temporal)   Resp 16   Ht 5' 4\" (1.626 m)   Wt 167 lb 9.6 oz (76 kg)   SpO2 98%   BMI 28.77 kg/m²     1. Have you been to the ER, urgent care clinic since your last visit? Hospitalized since your last visit? No    2. Have you seen or consulted any other health care providers outside of the [address] [place] [address] since your last visit? Include any pap smears or colon screening.  No

## 2021-11-12 ENCOUNTER — APPOINTMENT (OUTPATIENT)
Dept: CT IMAGING | Age: 86
End: 2021-11-12
Attending: STUDENT IN AN ORGANIZED HEALTH CARE EDUCATION/TRAINING PROGRAM
Payer: MEDICARE

## 2021-11-12 ENCOUNTER — HOSPITAL ENCOUNTER (EMERGENCY)
Age: 86
Discharge: HOME | End: 2021-11-12
Attending: STUDENT IN AN ORGANIZED HEALTH CARE EDUCATION/TRAINING PROGRAM
Payer: MEDICARE

## 2021-11-12 VITALS
DIASTOLIC BLOOD PRESSURE: 89 MMHG | HEART RATE: 89 BPM | OXYGEN SATURATION: 98 % | RESPIRATION RATE: 14 BRPM | WEIGHT: 165 LBS | SYSTOLIC BLOOD PRESSURE: 145 MMHG | BODY MASS INDEX: 27.49 KG/M2 | HEIGHT: 65 IN | TEMPERATURE: 97.9 F

## 2021-11-12 DIAGNOSIS — S09.90XA TRAUMATIC INJURY OF HEAD, INITIAL ENCOUNTER: ICD-10-CM
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
## 2021-11-12 DIAGNOSIS — R03.0 ELEVATED BLOOD PRESSURE READING: ICD-10-CM

## 2021-11-12 LAB
ANION GAP SERPL CALC-SCNC: 5 MMOL/L (ref 5–15)
BASOPHILS # BLD: 0 K/UL (ref 0–0.1)
BASOPHILS NFR BLD: 0 % (ref 0–1)
BUN SERPL-MCNC: 16 MG/DL (ref 6–20)
BUN/CREAT SERPL: 16 (ref 12–20)
CA-I BLD-MCNC: 9.5 MG/DL (ref 8.5–10.1)
CHLORIDE SERPL-SCNC: 106 MMOL/L (ref 97–108)
CO2 SERPL-SCNC: 32 MMOL/L (ref 21–32)
CREAT SERPL-MCNC: 0.98 MG/DL (ref 0.55–1.02)
DIFFERENTIAL METHOD BLD: NORMAL
EOSINOPHIL # BLD: 0.2 K/UL (ref 0–0.4)
EOSINOPHIL NFR BLD: 3 % (ref 0–7)
ERYTHROCYTE [DISTWIDTH] IN BLOOD BY AUTOMATED COUNT: 14.3 % (ref 11.5–14.5)
GLUCOSE SERPL-MCNC: 150 MG/DL (ref 65–100)
HCT VFR BLD AUTO: 36.4 % (ref 35–47)
HGB BLD-MCNC: 11.8 G/DL (ref 11.5–16)
IMM GRANULOCYTES # BLD AUTO: 0 K/UL (ref 0–0.04)
IMM GRANULOCYTES NFR BLD AUTO: 0 % (ref 0–0.5)
INR PPP: 1.5 (ref 0.9–1.1)
LYMPHOCYTES # BLD: 1.4 K/UL (ref 0.8–3.5)
LYMPHOCYTES NFR BLD: 21 % (ref 12–49)
MCH RBC QN AUTO: 29.6 PG (ref 26–34)
MCHC RBC AUTO-ENTMCNC: 32.4 G/DL (ref 30–36.5)
MCV RBC AUTO: 91.5 FL (ref 80–99)
MONOCYTES # BLD: 0.6 K/UL (ref 0–1)
MONOCYTES NFR BLD: 9 % (ref 5–13)
NEUTS SEG # BLD: 4.4 K/UL (ref 1.8–8)
NEUTS SEG NFR BLD: 67 % (ref 32–75)
NRBC # BLD: 0 K/UL (ref 0–0.01)
NRBC BLD-RTO: 0 PER 100 WBC
PLATELET # BLD AUTO: 184 K/UL (ref 150–400)
PMV BLD AUTO: 9.5 FL (ref 8.9–12.9)
POTASSIUM SERPL-SCNC: 3.2 MMOL/L (ref 3.5–5.1)
PROTHROMBIN TIME: 17.9 SEC (ref 11.9–14.7)
RBC # BLD AUTO: 3.98 M/UL (ref 3.8–5.2)
SODIUM SERPL-SCNC: 143 MMOL/L (ref 136–145)
WBC # BLD AUTO: 6.5 K/UL (ref 3.6–11)

## 2021-11-12 PROCEDURE — 85025 COMPLETE CBC W/AUTO DIFF WBC: CPT

## 2021-11-12 PROCEDURE — 70450 CT HEAD/BRAIN W/O DYE: CPT

## 2021-11-12 PROCEDURE — 99284 EMERGENCY DEPT VISIT MOD MDM: CPT

## 2021-11-12 PROCEDURE — 80048 BASIC METABOLIC PNL TOTAL CA: CPT

## 2021-11-12 PROCEDURE — 85610 PROTHROMBIN TIME: CPT

## 2021-11-12 PROCEDURE — 74011250637 HC RX REV CODE- 250/637: Performed by: STUDENT IN AN ORGANIZED HEALTH CARE EDUCATION/TRAINING PROGRAM

## 2021-11-12 PROCEDURE — 36415 COLL VENOUS BLD VENIPUNCTURE: CPT

## 2021-11-12 PROCEDURE — 72125 CT NECK SPINE W/O DYE: CPT

## 2021-11-12 RX ORDER — POTASSIUM CHLORIDE 750 MG/1
40 TABLET, FILM COATED, EXTENDED RELEASE ORAL
Status: COMPLETED | OUTPATIENT
Start: 2021-11-12 | End: 2021-11-12

## 2021-11-12 RX ADMIN — POTASSIUM CHLORIDE 40 MEQ: 750 TABLET, FILM COATED, EXTENDED RELEASE ORAL at 23:05

## 2021-11-13 NOTE — ED PROVIDER NOTES
EMERGENCY DEPARTMENT HISTORY AND PHYSICAL EXAM      Date: 11/12/2021  Patient Name: Juan Garcia      History of Presenting Illness     Chief Complaint   Patient presents with    Fall       History Provided By: Patient    HPI: Juan Garcia, 80 y.o. female with abilities as below including atrial fibrillation on anticoagulation with warfarin presents after having a mechanical fall while she was trying to climb a ladder in which she fell and hit the back of her head resulting and contusion. Sequently, patient was placed in cervical collar upon EMS call and was brought to the emerge department. Recently, patient Coumadin was adjusted. She is denying any other complaints and other traumas. There are no other complaints, changes, or physical findings at this time. PCP: Temo Márquez NP    Current Outpatient Medications   Medication Sig Dispense Refill    multivitamin (ONE A DAY) tablet Take 1 Tablet by mouth daily.  calcium carbonate (CALTREX) 600 mg calcium (1,500 mg) tablet Take 600 mg by mouth two (2) times a day.  mirtazapine (REMERON) 15 mg tablet Take 15 mg by mouth nightly.  hydroCHLOROthiazide (HYDRODIURIL) 25 mg tablet Take 25 mg by mouth daily.  ascorbic acid, vitamin C, (Vitamin C) 500 mg tablet Take 500 mg by mouth daily.  ondansetron (ZOFRAN ODT) 4 mg disintegrating tablet Take 1 Tablet by mouth every eight (8) hours as needed for Nausea or Vomiting. 24 Tablet 1    acetaminophen (TYLENOL) 325 mg tablet Take 2 Tablets by mouth every six (6) hours as needed for Pain or Fever. 60 Tablet 0    dilTIAZem IR (CARDIZEM) 30 mg tablet Take 1 Tablet by mouth every six (6) hours. (Patient taking differently: Take 120 mg by mouth daily.) 120 Tablet 0    niacin (NIASPAN) 1,000 mg Tb24 tab Take 1,000 mg by mouth nightly.  warfarin (Coumadin) 5 mg tablet Take 2.5 mg by mouth daily.  atorvastatin (LIPITOR) 20 mg tablet Take 20 mg by mouth daily.       minoxidiL (LONITEN) 10 mg tablet Take 5 mg by mouth daily.  isosorbide mononitrate ER (IMDUR) 30 mg tablet Take 30 mg by mouth daily.  cholecalciferol, vitamin D3, (Vitamin D3) 50 mcg (2,000 unit) tab Take 2,000 Units by mouth.  levothyroxine (SYNTHROID) 75 mcg tablet Take 75 mcg by mouth Daily (before breakfast). Past History     Past Medical History:  Past Medical History:   Diagnosis Date    [facility]     CAD (coronary artery disease)     Diabetes mellitus ([facility]     resolved    Endometrial cancer ([facility] [facility]     s/p TAHBSO, RT in 1990s    Hypertension     Skin cancer        Past Surgical History:  Past Surgical History:   Procedure Laterality Date    HX COLONOSCOPY  2011    HX HYSTERECTOMY      IR CHOLECYSTOSTOMY PERCUTANEOUS  7/27/2021       Family History:  Family History   Problem Relation Age of Onset    Hypertension Father     Cancer Father     Hypertension Brother     Ovarian Cancer Other         daughter, unsure if genetic testing performed       Social History:  Social History     Tobacco Use    Smoking status: Former Smoker    Smokeless tobacco: Never Used    Tobacco comment: quit >50 yrs ago   Vaping Use    Vaping Use: Never used   Substance Use Topics    Alcohol use: Not Currently    Drug use: Never       Allergies: Allergies   Allergen Reactions    Penicillins Swelling         Review of Systems     Review of Systems   Constitutional: Negative for chills and fever. HENT: Negative for congestion and rhinorrhea. Eyes: Negative for photophobia and discharge. Respiratory: Negative for cough and shortness of breath. Cardiovascular: Negative for chest pain and leg swelling. Gastrointestinal: Negative for abdominal pain and vomiting. Musculoskeletal: Negative for back pain and gait problem. Skin: Negative for rash and wound. Neurological: Negative for seizures and weakness. Psychiatric/Behavioral: Negative for behavioral problems and confusion.        Physical Exam     Physical Exam  Vitals and nursing note reviewed. Constitutional:       General: She is not in acute distress. Appearance: Normal appearance. HENT:      Head: Normocephalic. Comments: Occipital contusion     Mouth/Throat:      Mouth: Mucous membranes are moist.      Pharynx: Oropharynx is clear. Eyes:      Extraocular Movements: Extraocular movements intact. Conjunctiva/sclera: Conjunctivae normal.   Cardiovascular:      Rate and Rhythm: Normal rate and regular rhythm. Pulmonary:      Effort: Pulmonary effort is normal.      Breath sounds: Normal breath sounds. Abdominal:      Palpations: Abdomen is soft. Tenderness: There is no abdominal tenderness. Musculoskeletal:      Cervical back: Normal range of motion and neck supple. Skin:     General: Skin is warm and dry. Neurological:      General: No focal deficit present. Mental Status: She is alert and oriented to person, place, and time. Lab and Diagnostic Study Results     Labs -     Recent Results (from the past 12 hour(s))   CBC WITH AUTOMATED DIFF    Collection Time: 11/12/21  8:30 PM   Result Value Ref Range    WBC 6.5 3.6 - 11.0 K/uL    RBC 3.98 3.80 - 5.20 M/uL    HGB 11.8 11.5 - 16.0 g/dL    HCT 36.4 35.0 - 47.0 %    MCV 91.5 80.0 - 99.0 FL    MCH 29.6 26.0 - 34.0 PG    MCHC 32.4 30.0 - 36.5 g/dL    RDW 14.3 11.5 - 14.5 %    PLATELET 979 084 - 947 K/uL    MPV 9.5 8.9 - 12.9 FL    NRBC 0.0 0.0  WBC    ABSOLUTE NRBC 0.00 0.00 - 0.01 K/uL    NEUTROPHILS 67 32 - 75 %    LYMPHOCYTES 21 12 - 49 %    MONOCYTES 9 5 - 13 %    EOSINOPHILS 3 0 - 7 %    BASOPHILS 0 0 - 1 %    IMMATURE GRANULOCYTES 0 0 - 0.5 %    ABS. NEUTROPHILS 4.4 1.8 - 8.0 K/UL    ABS. LYMPHOCYTES 1.4 0.8 - 3.5 K/UL    ABS. MONOCYTES 0.6 0.0 - 1.0 K/UL    ABS. EOSINOPHILS 0.2 0.0 - 0.4 K/UL    ABS. BASOPHILS 0.0 0.0 - 0.1 K/UL    ABS. IMM.  GRANS. 0.0 0.00 - 0.04 K/UL    DF AUTOMATED     PROTHROMBIN TIME + INR    Collection Time: 11/12/21 8:30 PM   Result Value Ref Range    Prothrombin time 17.9 (H) 11.9 - 14.7 sec    INR 1.5 (H) 0.9 - 1.1     METABOLIC PANEL, BASIC    Collection Time: 11/12/21  8:30 PM   Result Value Ref Range    Sodium 143 136 - 145 mmol/L    Potassium 3.2 (L) 3.5 - 5.1 mmol/L    Chloride 106 97 - 108 mmol/L    CO2 32 21 - 32 mmol/L    Anion gap 5 5 - 15 mmol/L    Glucose 150 (H) 65 - 100 mg/dL    BUN 16 6 - 20 mg/dL    Creatinine 0.98 0.55 - 1.02 mg/dL    BUN/Creatinine ratio 16 12 - 20      GFR est AA >60 >60 ml/min/1.73m2    GFR est non-AA 54 (L) >60 ml/min/1.73m2    Calcium 9.5 8.5 - 10.1 mg/dL       Radiologic Studies -   [unfilled]  CT Results  (Last 48 hours)               11/12/21 1940  CT HEAD WO CONT Final result    Impression:      Head:   1. No acute intracranial abnormality. 2. Small right subdural scalp hematoma. Cervical spine: No acute cervical spine fracture identified. Narrative:  Exam: CT HEAD WO CONT, CT SPINE CERV WO CONT       TECHNIQUE: Multiple transaxial CT images of the head and cervical spine were   obtained without contrast. Coronal and sagittal reformatted images were   provided. Dose reduction: All CT scans at this facility are performed using dose reduction   optimization techniques as appropriate to a performed exam including the   following: Automated exposure control, adjustments of the mA and/or kV according   to patient size, or use of iterative reconstruction technique. COMPARISON: None       HISTORY: Fall with head trauma       FINDINGS:               Head: No acute intracranial hemorrhage. No acute large territory infarction or   mass effect. No extra-axial or intra-axial fluid collections. There is   generalized parenchymal volume loss with ex vacuo dilatation of the CSF spaces. Paranasal sinuses are well aerated. Orbits and orbital contents are   unremarkable. Calvarium and skull base are intact. Small right occipital scalp   hematoma.        Cervical spine: No fracture or traumatic subluxation. There is exaggeration of the usual   cervical lordosis. No acute fracture or traumatic subluxation. Multilevel   degenerative disc disease with bridging osteophytes at C4-5. And in the upper   thoracic spine. Cluster of right upper lobe pulmonary nodules measures 4 mm. Left nodule measures up to 9 mm. Vascular calcifications. 11/12/21 1940  CT SPINE CERV WO CONT Final result    Impression:      Head:   1. No acute intracranial abnormality. 2. Small right subdural scalp hematoma. Cervical spine: No acute cervical spine fracture identified. Narrative:  Exam: CT HEAD WO CONT, CT SPINE CERV WO CONT       TECHNIQUE: Multiple transaxial CT images of the head and cervical spine were   obtained without contrast. Coronal and sagittal reformatted images were   provided. Dose reduction: All CT scans at this facility are performed using dose reduction   optimization techniques as appropriate to a performed exam including the   following: Automated exposure control, adjustments of the mA and/or kV according   to patient size, or use of iterative reconstruction technique. COMPARISON: None       HISTORY: Fall with head trauma       FINDINGS:               Head: No acute intracranial hemorrhage. No acute large territory infarction or   mass effect. No extra-axial or intra-axial fluid collections. There is   generalized parenchymal volume loss with ex vacuo dilatation of the CSF spaces. Paranasal sinuses are well aerated. Orbits and orbital contents are   unremarkable. Calvarium and skull base are intact. Small right occipital scalp   hematoma. Cervical spine:       No fracture or traumatic subluxation. There is exaggeration of the usual   cervical lordosis. No acute fracture or traumatic subluxation. Multilevel   degenerative disc disease with bridging osteophytes at C4-5. And in the upper   thoracic spine.  Cluster of right upper lobe pulmonary nodules measures 4 mm. Left nodule measures up to 9 mm. Vascular calcifications. CXR Results  (Last 48 hours)    None          Medical Decision Making and ED Course   - I am the first and primary provider for this patient AND AM THE PRIMARY PROVIDER OF RECORD. - I reviewed the vital signs, available nursing notes, past medical history, past surgical history, family history and social history. - Initial assessment performed. The patients presenting problems have been discussed, and the staff are in agreement with the care plan formulated and outlined with them. I have encouraged them to ask questions as they arise throughout their visit. Vital Signs-Reviewed the patient's vital signs. Patient Vitals for the past 24 hrs:   Temp Pulse Resp BP SpO2   11/12/21 2330  89 14 (!) 145/89 98 %   11/12/21 2036  91 15 (!) 146/92 97 %   11/12/21 1906 97.9 °F (36.6 °C) 96 16 (!) 152/62 98 %       Records Reviewed: Nursing Notes    Provider Notes (Medical Decision Making):     Patient is presenting with a mechanical fall at home. She had CT of the head and cervical spine were unremarkable. Due to Coumadin, obtain CBC, chemistry and INR. CBC is normal, chemistry showed mild hypokalemia in which the patient getting p.o. potassium. Her INR is 1.5. Patient does not have any other associated injuries. Patient is discharged to follow-up as an outpatient with her primary care doctor. Disposition     Disposition: Condition stable and improved  DC- Adult Discharges: All of the diagnostic tests were reviewed and questions answered. Diagnosis, care plan and treatment options were discussed. The patient understands the instructions and will follow up as directed. The patients results have been reviewed with them. They have been counseled regarding their diagnosis.   The patient and family member patient and daughter verbally convey understanding and agreement of the signs, symptoms, diagnosis, treatment and prognosis and additionally agrees to follow up as recommended with their PCP in 24 - 48 hours. They also agree with the care-plan and convey that all of their questions have been answered. I have also put together some discharge instructions for them that include: 1) educational information regarding their diagnosis, 2) how to care for their diagnosis at home, as well a 3) list of reasons why they would want to return to the ED prior to their follow-up appointment, should their condition change. Discharged      DISCHARGE PLAN:  1. Current Discharge Medication List      CONTINUE these medications which have NOT CHANGED    Details   multivitamin (ONE A DAY) tablet Take 1 Tablet by mouth daily. calcium carbonate (CALTREX) 600 mg calcium (1,500 mg) tablet Take 600 mg by mouth two (2) times a day. mirtazapine (REMERON) 15 mg tablet Take 15 mg by mouth nightly. hydroCHLOROthiazide (HYDRODIURIL) 25 mg tablet Take 25 mg by mouth daily. ascorbic acid, vitamin C, (Vitamin C) 500 mg tablet Take 500 mg by mouth daily. ondansetron (ZOFRAN ODT) 4 mg disintegrating tablet Take 1 Tablet by mouth every eight (8) hours as needed for Nausea or Vomiting. Qty: 24 Tablet, Refills: 1      acetaminophen (TYLENOL) 325 mg tablet Take 2 Tablets by mouth every six (6) hours as needed for Pain or Fever. Qty: 60 Tablet, Refills: 0      dilTIAZem IR (CARDIZEM) 30 mg tablet Take 1 Tablet by mouth every six (6) hours. Qty: 120 Tablet, Refills: 0      niacin (NIASPAN) 1,000 mg Tb24 tab Take 1,000 mg by mouth nightly. warfarin (Coumadin) 5 mg tablet Take 2.5 mg by mouth daily. atorvastatin (LIPITOR) 20 mg tablet Take 20 mg by mouth daily. minoxidiL (LONITEN) 10 mg tablet Take 5 mg by mouth daily. isosorbide mononitrate ER (IMDUR) 30 mg tablet Take 30 mg by mouth daily. cholecalciferol, vitamin D3, (Vitamin D3) 50 mcg (2,000 unit) tab Take 2,000 Units by mouth. levothyroxine (SYNTHROID) 75 mcg tablet Take 75 mcg by mouth Daily (before breakfast). 2.   Follow-up Information    None       3. Return to ED if worse   4. Discharge Medication List as of 11/12/2021 10:52 PM          Diagnosis     Clinical Impression:   1. Fall, initial encounter    2. Traumatic injury of head, initial encounter    3. Elevated blood pressure reading        Attestations: Ekta Luis MD    Please note that this dictation was completed with [employer], the computer voice recognition software. Quite often unanticipated grammatical, syntax, homophones, and other interpretive errors are inadvertently transcribed by the computer software. Please disregard these errors. Please excuse any errors that have escaped final proofreading. Thank you.

## 2021-11-23 ENCOUNTER — OFFICE VISIT (OUTPATIENT)
Dept: SURGERY | Age: 86
End: 2021-11-23
Payer: MEDICARE

## 2021-11-23 VITALS
RESPIRATION RATE: 18 BRPM | HEIGHT: 65 IN | OXYGEN SATURATION: 97 % | TEMPERATURE: 97.9 F | BODY MASS INDEX: 28.69 KG/M2 | HEART RATE: 75 BPM | SYSTOLIC BLOOD PRESSURE: 151 MMHG | DIASTOLIC BLOOD PRESSURE: 75 MMHG | WEIGHT: 172.2 LBS

## 2021-11-23 DIAGNOSIS — K81.9 CHOLECYSTITIS: Primary | ICD-10-CM

## 2021-11-23 PROCEDURE — G8510 SCR DEP NEG, NO PLAN REQD: HCPCS | Performed by: COLON & RECTAL SURGERY

## 2021-11-23 PROCEDURE — 99213 OFFICE O/P EST LOW 20 MIN: CPT | Performed by: COLON & RECTAL SURGERY

## 2021-11-23 PROCEDURE — G8536 NO DOC ELDER MAL SCRN: HCPCS | Performed by: COLON & RECTAL SURGERY

## 2021-11-23 PROCEDURE — G8417 CALC BMI ABV UP PARAM F/U: HCPCS | Performed by: COLON & RECTAL SURGERY

## 2021-11-23 PROCEDURE — 1090F PRES/ABSN URINE INCON ASSESS: CPT | Performed by: COLON & RECTAL SURGERY

## 2021-11-23 PROCEDURE — 1101F PT FALLS ASSESS-DOCD LE1/YR: CPT | Performed by: COLON & RECTAL SURGERY

## 2021-11-23 PROCEDURE — G8427 DOCREV CUR MEDS BY ELIG CLIN: HCPCS | Performed by: COLON & RECTAL SURGERY

## 2021-11-23 NOTE — PROGRESS NOTES
OFFICE VISIT NOTE    Bianca Wolf is a 80 y.o. female who presents to the office today for:    Chief Complaint   Patient presents with   [address] comes in today for follow-up status post removal of her cholecystostomy tube approximately 4 weeks ago. She had a cholecystostomy tube placed by interventional radiology back in July 2021 for acute cholecystitis. She was septic and hypotensive at that time on pressors. Today she has no complaints. She states that the site is completely healed up. She has no abdominal pain. She is tolerating a diet. Past Medical History:   Diagnosis Date    Atrial fibrillation ([address]     CAD (coronary artery disease)     Diabetes mellitus ([facility]     resolved    Endometrial cancer ([facility] [facility]     s/p TAYESY, RT in 1990s    Hypertension     Skin cancer        Past Surgical History:   Procedure Laterality Date    HX COLONOSCOPY  2011    HX HYSTERECTOMY      IR CHOLECYSTOSTOMY PERCUTANEOUS  7/27/2021       Family History   Problem Relation Age of Onset    Hypertension Father     Cancer Father     Hypertension Brother     Ovarian Cancer Other         daughter, unsure if genetic testing performed       Social History     Socioeconomic History    Marital status:      Spouse name: Not on file    Number of children: Not on file    Years of education: Not on file    Highest education level: Not on file   Occupational History    Not on file   Tobacco Use    Smoking status: Former Smoker    Smokeless tobacco: Never Used    Tobacco comment: quit >50 yrs ago   Vaping Use    Vaping Use: Never used   Substance and Sexual Activity    Alcohol use: Not Currently    Drug use: Never    Sexual activity: Not on file   Other Topics Concern    Not on file   Social History Narrative    Lives alone.      Social Determinants of Health     Financial Resource Strain:     Difficulty of Paying Living Expenses: Not on file   Food Insecurity:     Worried About Running Out of Food in the Last Year: Not on file    [place]den of Food in the Last Year: Not on file   Transportation Needs:     Lack of Transportation (Medical): Not on file    Lack of Transportation (Non-Medical): Not on file   Physical Activity:     Days of Exercise per Week: Not on file    Minutes of Exercise per Session: Not on file   Stress:     Feeling of Stress : Not on file   Social Connections:     Frequency of Communication with Friends and Family: Not on file    Frequency of Social Gatherings with Friends and Family: Not on file    Attends [employer] Services: Not on file    Active Member of [address] [place] [address] or Organizations: Not on file    Attends Club or Organization Meetings: Not on file    Marital Status: Not on file   Intimate Partner Violence:     Fear of Current or Ex-Partner: Not on file    Emotionally Abused: Not on file    Physically Abused: Not on file    Sexually Abused: Not on file   Housing Stability:     Unable to Pay for Housing in the Last Year: Not on file    Number of [place] in the Last Year: Not on file    Unstable Housing in the Last Year: Not on file       Allergies   Allergen Reactions    Penicillins Swelling       Current Outpatient Medications   Medication Sig    multivitamin (ONE A DAY) tablet Take 1 Tablet by mouth daily.  calcium carbonate (CALTREX) 600 mg calcium (1,500 mg) tablet Take 600 mg by mouth two (2) times a day.  mirtazapine (REMERON) 15 mg tablet Take 15 mg by mouth nightly.  hydroCHLOROthiazide (HYDRODIURIL) 25 mg tablet Take 25 mg by mouth daily.  ascorbic acid, vitamin C, (Vitamin C) 500 mg tablet Take 500 mg by mouth daily.  ondansetron (ZOFRAN ODT) 4 mg disintegrating tablet Take 1 Tablet by mouth every eight (8) hours as needed for Nausea or Vomiting.     acetaminophen (TYLENOL) 325 mg tablet Take 2 Tablets by mouth every six (6) hours as needed for Pain or Fever.  niacin (NIASPAN) 1,000 mg Tb24 tab Take 1,000 mg by mouth nightly.  warfarin (Coumadin) 5 mg tablet Take 2.5 mg by mouth daily.  atorvastatin (LIPITOR) 20 mg tablet Take 20 mg by mouth daily.  minoxidiL (LONITEN) 10 mg tablet Take 5 mg by mouth daily.  isosorbide mononitrate ER (IMDUR) 30 mg tablet Take 30 mg by mouth daily.  cholecalciferol, vitamin D3, (Vitamin D3) 50 mcg (2,000 unit) tab Take 2,000 Units by mouth.  levothyroxine (SYNTHROID) 75 mcg tablet Take 75 mcg by mouth Daily (before breakfast).  dilTIAZem IR (CARDIZEM) 30 mg tablet Take 1 Tablet by mouth every six (6) hours. (Patient taking differently: Take 120 mg by mouth daily.)     No current facility-administered medications for this visit. Review of Systems   All other systems reviewed and are negative. BP (!) 151/75 (BP 1 Location: Left arm, BP Patient Position: Sitting)   Pulse 75   Temp 97.9 °F (36.6 °C) (Temporal)   Resp 18   Ht 5' 5\" (1.651 m)   Wt 172 lb 3.2 oz (78.1 kg)   SpO2 97%   BMI 28.66 kg/m²     Physical Exam  Abdominal:      Comments: On examination abdomen is soft, nondistended, nontender. Her cholecystostomy tube site at the skin level is completely healed. Problem List Items Addressed This Visit        Digestive    Cholecystitis - Primary          Assessment and Plan:  I am pleased to see that Veronica Salmeron is doing well. I told her that she can follow-up with me on an as-needed basis should she develop any abdominal pain.             Carlton Dubon MD

## 2021-11-23 NOTE — PROGRESS NOTES
Chief Complaint   Patient presents with    Follow-up     Wound Check      Visit Vitals  BP (!) 151/75 (BP 1 Location: Left arm, BP Patient Position: Sitting)   Pulse 75   Temp 97.9 °F (36.6 °C) (Temporal)   Resp 18   Ht 5' 5\" (1.651 m)   Wt 172 lb 3.2 oz (78.1 kg)   SpO2 97%   BMI 28.66 kg/m²     1. Have you been to the ER, urgent care clinic since your last visit? Hospitalized since your last visit? No    2. Have you seen or consulted any other health care providers outside of the [address] [place] [address] since your last visit? Include any pap smears or colon screening.  No

## 2022-03-18 PROBLEM — K81.9 CHOLECYSTITIS: Status: ACTIVE | Noted: 2021-08-30

## 2022-03-19 PROBLEM — R07.9 CHEST PAIN WITH MODERATE RISK FOR CARDIAC ETIOLOGY: Status: ACTIVE | Noted: 2021-07-25

## 2022-03-20 PROBLEM — I20.0 UNSTABLE ANGINA (HCC): Status: ACTIVE | Noted: 2021-07-25

## 2023-05-29 RX ORDER — MINOXIDIL 10 MG/1
5 TABLET ORAL DAILY
COMMUNITY

## 2023-05-29 RX ORDER — HYDROCHLOROTHIAZIDE 25 MG/1
25 TABLET ORAL DAILY
COMMUNITY

## 2023-05-29 RX ORDER — ATORVASTATIN CALCIUM 20 MG/1
20 TABLET, FILM COATED ORAL DAILY
COMMUNITY

## 2023-05-29 RX ORDER — WARFARIN SODIUM 5 MG/1
2.5 TABLET ORAL DAILY
COMMUNITY

## 2023-05-29 RX ORDER — MIRTAZAPINE 15 MG/1
15 TABLET, FILM COATED ORAL NIGHTLY
COMMUNITY

## 2023-05-29 RX ORDER — ACETAMINOPHEN 325 MG/1
650 TABLET ORAL EVERY 6 HOURS PRN
COMMUNITY
Start: 2021-07-31

## 2023-05-29 RX ORDER — ISOSORBIDE MONONITRATE 30 MG/1
30 TABLET, EXTENDED RELEASE ORAL DAILY
COMMUNITY

## 2023-05-29 RX ORDER — NIACIN 1000 MG
1000 TABLET, EXTENDED RELEASE ORAL
COMMUNITY

## 2023-05-29 RX ORDER — ASCORBIC ACID 500 MG
500 TABLET ORAL DAILY
COMMUNITY

## 2023-05-29 RX ORDER — LEVOTHYROXINE SODIUM 0.07 MG/1
75 TABLET ORAL
COMMUNITY

## 2023-05-29 RX ORDER — ONDANSETRON 4 MG/1
4 TABLET, ORALLY DISINTEGRATING ORAL EVERY 8 HOURS PRN
COMMUNITY
Start: 2021-08-11

## 2024-02-23 ENCOUNTER — HOSPITAL ENCOUNTER (EMERGENCY)
Facility: HOSPITAL | Age: 89
Discharge: HOME | End: 2024-02-23
Attending: EMERGENCY MEDICINE
Payer: MEDICARE

## 2024-02-23 VITALS
SYSTOLIC BLOOD PRESSURE: 181 MMHG | BODY MASS INDEX: 33.66 KG/M2 | DIASTOLIC BLOOD PRESSURE: 79 MMHG | HEART RATE: 76 BPM | HEIGHT: 65 IN | WEIGHT: 202 LBS | OXYGEN SATURATION: 98 % | RESPIRATION RATE: 17 BRPM | TEMPERATURE: 97.9 F

## 2024-02-23 DIAGNOSIS — T16.2XXA FOREIGN BODY OF LEFT EAR, INITIAL ENCOUNTER: Primary | ICD-10-CM

## 2024-02-23 PROCEDURE — 99282 EMERGENCY DEPT VISIT SF MDM: CPT

## 2024-02-23 PROCEDURE — 69200 CLEAR OUTER EAR CANAL: CPT

## 2024-02-23 ASSESSMENT — LIFESTYLE VARIABLES
HOW MANY STANDARD DRINKS CONTAINING ALCOHOL DO YOU HAVE ON A TYPICAL DAY: PATIENT DOES NOT DRINK
HOW OFTEN DO YOU HAVE A DRINK CONTAINING ALCOHOL: NEVER

## 2024-02-23 NOTE — ED TRIAGE NOTES
Pt in ED with c/o hearing aid sponge that is stuck in the left ear for a couple weeks and then pain that started in that ear last night.

## 2024-10-16 ENCOUNTER — TRANSCRIBE ORDERS (OUTPATIENT)
Facility: HOSPITAL | Age: 89
End: 2024-10-16

## 2024-10-16 DIAGNOSIS — R06.9 ABNORMAL RESPIRATORY RATE: Primary | ICD-10-CM
